# Patient Record
Sex: FEMALE | Race: WHITE | HISPANIC OR LATINO | Employment: UNEMPLOYED | URBAN - METROPOLITAN AREA
[De-identification: names, ages, dates, MRNs, and addresses within clinical notes are randomized per-mention and may not be internally consistent; named-entity substitution may affect disease eponyms.]

---

## 2017-01-11 ENCOUNTER — ALLSCRIPTS OFFICE VISIT (OUTPATIENT)
Dept: OTHER | Facility: OTHER | Age: 42
End: 2017-01-11

## 2017-01-12 ENCOUNTER — ALLSCRIPTS OFFICE VISIT (OUTPATIENT)
Dept: OTHER | Facility: OTHER | Age: 42
End: 2017-01-12

## 2017-01-25 ENCOUNTER — TRANSCRIBE ORDERS (OUTPATIENT)
Dept: ADMINISTRATIVE | Facility: HOSPITAL | Age: 42
End: 2017-01-25

## 2017-01-25 ENCOUNTER — ALLSCRIPTS OFFICE VISIT (OUTPATIENT)
Dept: OTHER | Facility: OTHER | Age: 42
End: 2017-01-25

## 2017-01-25 DIAGNOSIS — M54.5 LOW BACK PAIN, UNSPECIFIED BACK PAIN LATERALITY, UNSPECIFIED CHRONICITY, WITH SCIATICA PRESENCE UNSPECIFIED: Primary | ICD-10-CM

## 2017-01-25 DIAGNOSIS — M54.16 LUMBAR RADICULOPATHY: ICD-10-CM

## 2017-01-26 ENCOUNTER — GENERIC CONVERSION - ENCOUNTER (OUTPATIENT)
Dept: OTHER | Facility: OTHER | Age: 42
End: 2017-01-26

## 2017-01-31 ENCOUNTER — HOSPITAL ENCOUNTER (OUTPATIENT)
Dept: MRI IMAGING | Facility: HOSPITAL | Age: 42
Discharge: HOME | End: 2017-01-31
Attending: ORTHOPAEDIC SURGERY
Payer: COMMERCIAL

## 2017-01-31 DIAGNOSIS — M54.50 LOW BACK PAIN: ICD-10-CM

## 2017-01-31 DIAGNOSIS — M54.16 RADICULOPATHY OF LUMBAR REGION: ICD-10-CM

## 2017-01-31 PROCEDURE — 72148 MRI LUMBAR SPINE W/O DYE: CPT

## 2017-02-01 ENCOUNTER — ALLSCRIPTS OFFICE VISIT (OUTPATIENT)
Dept: OTHER | Facility: OTHER | Age: 42
End: 2017-02-01

## 2017-02-01 DIAGNOSIS — I83.819 VARICOSE VEINS OF LOWER EXTREMITY WITH PAIN: ICD-10-CM

## 2017-02-08 ENCOUNTER — ALLSCRIPTS OFFICE VISIT (OUTPATIENT)
Dept: OTHER | Facility: OTHER | Age: 42
End: 2017-02-08

## 2017-03-31 ENCOUNTER — GENERIC CONVERSION - ENCOUNTER (OUTPATIENT)
Dept: OTHER | Facility: OTHER | Age: 42
End: 2017-03-31

## 2017-05-31 ENCOUNTER — APPOINTMENT (OUTPATIENT)
Dept: LAB | Facility: HOSPITAL | Age: 42
End: 2017-05-31
Payer: COMMERCIAL

## 2017-05-31 DIAGNOSIS — I83.819 VARICOSE VEINS OF LOWER EXTREMITY WITH PAIN: ICD-10-CM

## 2017-05-31 DIAGNOSIS — R53.82 CHRONIC FATIGUE: ICD-10-CM

## 2017-05-31 DIAGNOSIS — E55.9 VITAMIN D DEFICIENCY: ICD-10-CM

## 2017-05-31 DIAGNOSIS — F32.9 MAJOR DEPRESSIVE DISORDER, SINGLE EPISODE: ICD-10-CM

## 2017-05-31 DIAGNOSIS — F41.9 ANXIETY DISORDER: ICD-10-CM

## 2017-05-31 DIAGNOSIS — E66.3 OVERWEIGHT: ICD-10-CM

## 2017-05-31 LAB
25(OH)D3 SERPL-MCNC: 14.8 NG/ML (ref 30–100)
ALBUMIN SERPL BCP-MCNC: 3.5 G/DL (ref 3.5–5)
ALP SERPL-CCNC: 97 U/L (ref 46–116)
ALT SERPL W P-5'-P-CCNC: 27 U/L (ref 12–78)
ANION GAP SERPL CALCULATED.3IONS-SCNC: 5 MMOL/L (ref 4–13)
AST SERPL W P-5'-P-CCNC: 15 U/L (ref 5–45)
BASOPHILS # BLD AUTO: 0.02 THOUSANDS/ΜL (ref 0–0.1)
BASOPHILS NFR BLD AUTO: 0 % (ref 0–1)
BILIRUB SERPL-MCNC: 0.24 MG/DL (ref 0.2–1)
BUN SERPL-MCNC: 18 MG/DL (ref 5–25)
CALCIUM SERPL-MCNC: 9.2 MG/DL (ref 8.3–10.1)
CHLORIDE SERPL-SCNC: 104 MMOL/L (ref 100–108)
CHOLEST SERPL-MCNC: 166 MG/DL (ref 50–200)
CO2 SERPL-SCNC: 32 MMOL/L (ref 21–32)
CREAT SERPL-MCNC: 0.87 MG/DL (ref 0.6–1.3)
EOSINOPHIL # BLD AUTO: 0.23 THOUSAND/ΜL (ref 0–0.61)
EOSINOPHIL NFR BLD AUTO: 3 % (ref 0–6)
ERYTHROCYTE [DISTWIDTH] IN BLOOD BY AUTOMATED COUNT: 12.9 % (ref 11.6–15.1)
GFR SERPL CREATININE-BSD FRML MDRD: >60 ML/MIN/1.73SQ M
GLUCOSE P FAST SERPL-MCNC: 101 MG/DL (ref 65–99)
HCT VFR BLD AUTO: 37.8 % (ref 34.8–46.1)
HDLC SERPL-MCNC: 51 MG/DL (ref 40–60)
HGB BLD-MCNC: 12.4 G/DL (ref 11.5–15.4)
LDLC SERPL CALC-MCNC: 104 MG/DL (ref 0–100)
LYMPHOCYTES # BLD AUTO: 2.77 THOUSANDS/ΜL (ref 0.6–4.47)
LYMPHOCYTES NFR BLD AUTO: 33 % (ref 14–44)
MCH RBC QN AUTO: 30.5 PG (ref 26.8–34.3)
MCHC RBC AUTO-ENTMCNC: 32.8 G/DL (ref 31.4–37.4)
MCV RBC AUTO: 93 FL (ref 82–98)
MONOCYTES # BLD AUTO: 0.36 THOUSAND/ΜL (ref 0.17–1.22)
MONOCYTES NFR BLD AUTO: 4 % (ref 4–12)
NEUTROPHILS # BLD AUTO: 4.94 THOUSANDS/ΜL (ref 1.85–7.62)
NEUTS SEG NFR BLD AUTO: 60 % (ref 43–75)
NRBC BLD AUTO-RTO: 0 /100 WBCS
PLATELET # BLD AUTO: 255 THOUSANDS/UL (ref 149–390)
PMV BLD AUTO: 10.3 FL (ref 8.9–12.7)
POTASSIUM SERPL-SCNC: 4.3 MMOL/L (ref 3.5–5.3)
PROT SERPL-MCNC: 7.6 G/DL (ref 6.4–8.2)
RBC # BLD AUTO: 4.06 MILLION/UL (ref 3.81–5.12)
SODIUM SERPL-SCNC: 141 MMOL/L (ref 136–145)
TRIGL SERPL-MCNC: 55 MG/DL
TSH SERPL DL<=0.05 MIU/L-ACNC: 0.28 UIU/ML (ref 0.36–3.74)
VIT B12 SERPL-MCNC: 389 PG/ML (ref 100–900)
WBC # BLD AUTO: 8.32 THOUSAND/UL (ref 4.31–10.16)

## 2017-05-31 PROCEDURE — 80053 COMPREHEN METABOLIC PANEL: CPT

## 2017-05-31 PROCEDURE — 36415 COLL VENOUS BLD VENIPUNCTURE: CPT

## 2017-05-31 PROCEDURE — 84443 ASSAY THYROID STIM HORMONE: CPT

## 2017-05-31 PROCEDURE — 85025 COMPLETE CBC W/AUTO DIFF WBC: CPT

## 2017-05-31 PROCEDURE — 82306 VITAMIN D 25 HYDROXY: CPT

## 2017-05-31 PROCEDURE — 82607 VITAMIN B-12: CPT

## 2017-05-31 PROCEDURE — 80061 LIPID PANEL: CPT

## 2017-06-01 ENCOUNTER — ANESTHESIA EVENT (OUTPATIENT)
Dept: PERIOP | Facility: HOSPITAL | Age: 42
End: 2017-06-01
Payer: COMMERCIAL

## 2017-06-01 RX ORDER — SODIUM CHLORIDE 9 MG/ML
125 INJECTION, SOLUTION INTRAVENOUS CONTINUOUS
Status: CANCELLED | OUTPATIENT
Start: 2017-06-01

## 2017-06-02 ENCOUNTER — APPOINTMENT (OUTPATIENT)
Dept: PREADMISSION TESTING | Facility: HOSPITAL | Age: 42
End: 2017-06-02
Payer: COMMERCIAL

## 2017-06-02 VITALS
HEART RATE: 67 BPM | BODY MASS INDEX: 35.49 KG/M2 | SYSTOLIC BLOOD PRESSURE: 126 MMHG | TEMPERATURE: 96.5 F | RESPIRATION RATE: 16 BRPM | HEIGHT: 65 IN | WEIGHT: 213 LBS | DIASTOLIC BLOOD PRESSURE: 68 MMHG

## 2017-06-02 DIAGNOSIS — G57.91 MONONEUROPATHY OF RIGHT LOWER EXTREMITY: ICD-10-CM

## 2017-06-02 DIAGNOSIS — R94.6 ABNORMAL RESULTS OF THYROID FUNCTION STUDIES: ICD-10-CM

## 2017-06-02 DIAGNOSIS — R60.9 EDEMA: ICD-10-CM

## 2017-06-02 RX ORDER — GABAPENTIN 600 MG/1
600 TABLET ORAL 3 TIMES DAILY
COMMUNITY
End: 2018-09-02

## 2017-06-05 ENCOUNTER — ANESTHESIA (OUTPATIENT)
Dept: PERIOP | Facility: HOSPITAL | Age: 42
End: 2017-06-05
Payer: COMMERCIAL

## 2017-06-05 ENCOUNTER — HOSPITAL ENCOUNTER (OUTPATIENT)
Facility: HOSPITAL | Age: 42
Setting detail: OUTPATIENT SURGERY
Discharge: HOME | End: 2017-06-05
Attending: SURGERY | Admitting: SURGERY
Payer: COMMERCIAL

## 2017-06-05 VITALS
BODY MASS INDEX: 35.49 KG/M2 | HEART RATE: 76 BPM | DIASTOLIC BLOOD PRESSURE: 70 MMHG | RESPIRATION RATE: 16 BRPM | OXYGEN SATURATION: 98 % | HEIGHT: 65 IN | TEMPERATURE: 97.6 F | SYSTOLIC BLOOD PRESSURE: 129 MMHG | WEIGHT: 213 LBS

## 2017-06-05 PROBLEM — I83.811 VARICOSE VEINS OF RIGHT LOWER EXTREMITIES WITH PAIN: Status: ACTIVE | Noted: 2017-06-05

## 2017-06-05 LAB — EXT PREGNANCY TEST URINE: NEGATIVE

## 2017-06-05 PROCEDURE — 81025 URINE PREGNANCY TEST: CPT | Performed by: ANESTHESIOLOGY

## 2017-06-05 RX ORDER — PROPOFOL 10 MG/ML
INJECTION, EMULSION INTRAVENOUS AS NEEDED
Status: DISCONTINUED | OUTPATIENT
Start: 2017-06-05 | End: 2017-06-05 | Stop reason: SURG

## 2017-06-05 RX ORDER — OXYCODONE HYDROCHLORIDE AND ACETAMINOPHEN 5; 325 MG/1; MG/1
2 TABLET ORAL ONCE
Status: COMPLETED | OUTPATIENT
Start: 2017-06-05 | End: 2017-06-05

## 2017-06-05 RX ORDER — OXYCODONE HYDROCHLORIDE AND ACETAMINOPHEN 5; 325 MG/1; MG/1
1 TABLET ORAL EVERY 4 HOURS PRN
Qty: 30 TABLET | Refills: 0 | Status: SHIPPED | OUTPATIENT
Start: 2017-06-05 | End: 2017-06-15

## 2017-06-05 RX ORDER — GLYCOPYRROLATE 0.2 MG/ML
INJECTION INTRAMUSCULAR; INTRAVENOUS AS NEEDED
Status: DISCONTINUED | OUTPATIENT
Start: 2017-06-05 | End: 2017-06-05 | Stop reason: SURG

## 2017-06-05 RX ORDER — SODIUM CHLORIDE 9 MG/ML
125 INJECTION, SOLUTION INTRAVENOUS CONTINUOUS
Status: DISCONTINUED | OUTPATIENT
Start: 2017-06-05 | End: 2017-06-05 | Stop reason: HOSPADM

## 2017-06-05 RX ORDER — MIDAZOLAM HYDROCHLORIDE 1 MG/ML
INJECTION INTRAMUSCULAR; INTRAVENOUS AS NEEDED
Status: DISCONTINUED | OUTPATIENT
Start: 2017-06-05 | End: 2017-06-05 | Stop reason: SURG

## 2017-06-05 RX ORDER — FENTANYL CITRATE 50 UG/ML
INJECTION, SOLUTION INTRAMUSCULAR; INTRAVENOUS AS NEEDED
Status: DISCONTINUED | OUTPATIENT
Start: 2017-06-05 | End: 2017-06-05 | Stop reason: SURG

## 2017-06-05 RX ORDER — ROCURONIUM BROMIDE 10 MG/ML
INJECTION, SOLUTION INTRAVENOUS AS NEEDED
Status: DISCONTINUED | OUTPATIENT
Start: 2017-06-05 | End: 2017-06-05 | Stop reason: SURG

## 2017-06-05 RX ORDER — EPHEDRINE SULFATE 50 MG/ML
INJECTION, SOLUTION INTRAVENOUS AS NEEDED
Status: DISCONTINUED | OUTPATIENT
Start: 2017-06-05 | End: 2017-06-05 | Stop reason: SURG

## 2017-06-05 RX ORDER — LIDOCAINE HYDROCHLORIDE 10 MG/ML
INJECTION, SOLUTION INFILTRATION; PERINEURAL AS NEEDED
Status: DISCONTINUED | OUTPATIENT
Start: 2017-06-05 | End: 2017-06-05 | Stop reason: SURG

## 2017-06-05 RX ORDER — FENTANYL CITRATE 50 UG/ML
50 INJECTION, SOLUTION INTRAMUSCULAR; INTRAVENOUS
Status: DISCONTINUED | OUTPATIENT
Start: 2017-06-05 | End: 2017-06-05 | Stop reason: HOSPADM

## 2017-06-05 RX ORDER — ONDANSETRON 2 MG/ML
4 INJECTION INTRAMUSCULAR; INTRAVENOUS EVERY 6 HOURS PRN
Status: DISCONTINUED | OUTPATIENT
Start: 2017-06-05 | End: 2017-06-05 | Stop reason: HOSPADM

## 2017-06-05 RX ORDER — ONDANSETRON 2 MG/ML
INJECTION INTRAMUSCULAR; INTRAVENOUS AS NEEDED
Status: DISCONTINUED | OUTPATIENT
Start: 2017-06-05 | End: 2017-06-05 | Stop reason: SURG

## 2017-06-05 RX ADMIN — SODIUM CHLORIDE 125 ML/HR: 0.9 INJECTION, SOLUTION INTRAVENOUS at 12:27

## 2017-06-05 RX ADMIN — FENTANYL CITRATE 50 MCG: 50 INJECTION, SOLUTION INTRAMUSCULAR; INTRAVENOUS at 13:29

## 2017-06-05 RX ADMIN — EPHEDRINE SULFATE 2.5 MG: 50 INJECTION, SOLUTION INTRAMUSCULAR; INTRAVENOUS; SUBCUTANEOUS at 13:42

## 2017-06-05 RX ADMIN — FENTANYL CITRATE 100 MCG: 50 INJECTION, SOLUTION INTRAMUSCULAR; INTRAVENOUS at 13:08

## 2017-06-05 RX ADMIN — FENTANYL CITRATE 50 MCG: 50 INJECTION, SOLUTION INTRAMUSCULAR; INTRAVENOUS at 14:15

## 2017-06-05 RX ADMIN — LIDOCAINE HYDROCHLORIDE 80 MG: 10 INJECTION, SOLUTION INFILTRATION; PERINEURAL at 13:08

## 2017-06-05 RX ADMIN — SODIUM CHLORIDE: 0.9 INJECTION, SOLUTION INTRAVENOUS at 15:25

## 2017-06-05 RX ADMIN — ROCURONIUM BROMIDE 50 MG: 10 INJECTION, SOLUTION INTRAVENOUS at 13:08

## 2017-06-05 RX ADMIN — FENTANYL CITRATE 25 MCG: 50 INJECTION, SOLUTION INTRAMUSCULAR; INTRAVENOUS at 15:22

## 2017-06-05 RX ADMIN — ROCURONIUM BROMIDE 10 MG: 10 INJECTION, SOLUTION INTRAVENOUS at 13:59

## 2017-06-05 RX ADMIN — CEFAZOLIN SODIUM 2000 MG: 2 SOLUTION INTRAVENOUS at 13:15

## 2017-06-05 RX ADMIN — MIDAZOLAM HYDROCHLORIDE 2 MG: 1 INJECTION, SOLUTION INTRAMUSCULAR; INTRAVENOUS at 13:02

## 2017-06-05 RX ADMIN — DEXAMETHASONE SODIUM PHOSPHATE 8 MG: 10 INJECTION INTRAMUSCULAR; INTRAVENOUS at 13:26

## 2017-06-05 RX ADMIN — OXYCODONE HYDROCHLORIDE AND ACETAMINOPHEN 2 TABLET: 5; 325 TABLET ORAL at 17:11

## 2017-06-05 RX ADMIN — NEOSTIGMINE METHYLSULFATE 4 MG: 1 INJECTION, SOLUTION INTRAMUSCULAR; INTRAVENOUS; SUBCUTANEOUS at 15:18

## 2017-06-05 RX ADMIN — SODIUM CHLORIDE: 0.9 INJECTION, SOLUTION INTRAVENOUS at 13:30

## 2017-06-05 RX ADMIN — ROCURONIUM BROMIDE 10 MG: 10 INJECTION, SOLUTION INTRAVENOUS at 14:32

## 2017-06-05 RX ADMIN — GLYCOPYRROLATE 0.8 MG: 0.2 INJECTION, SOLUTION INTRAMUSCULAR; INTRAVENOUS at 15:18

## 2017-06-05 RX ADMIN — PROPOFOL 200 MG: 10 INJECTION, EMULSION INTRAVENOUS at 13:08

## 2017-06-05 RX ADMIN — ONDANSETRON HYDROCHLORIDE 4 MG: 2 INJECTION, SOLUTION INTRAVENOUS at 13:26

## 2017-06-05 RX ADMIN — FENTANYL CITRATE 25 MCG: 50 INJECTION, SOLUTION INTRAMUSCULAR; INTRAVENOUS at 15:05

## 2017-06-05 RX ADMIN — FENTANYL CITRATE 50 MCG: 50 INJECTION, SOLUTION INTRAMUSCULAR; INTRAVENOUS at 13:59

## 2017-06-05 RX ADMIN — FENTANYL CITRATE 50 MCG: 50 INJECTION, SOLUTION INTRAMUSCULAR; INTRAVENOUS at 15:50

## 2017-06-06 ENCOUNTER — HOSPITAL ENCOUNTER (EMERGENCY)
Facility: HOSPITAL | Age: 42
Discharge: HOME | End: 2017-06-06
Attending: EMERGENCY MEDICINE | Admitting: EMERGENCY MEDICINE
Payer: COMMERCIAL

## 2017-06-06 VITALS
WEIGHT: 213 LBS | BODY MASS INDEX: 35.45 KG/M2 | TEMPERATURE: 98.2 F | SYSTOLIC BLOOD PRESSURE: 160 MMHG | HEART RATE: 67 BPM | OXYGEN SATURATION: 100 % | DIASTOLIC BLOOD PRESSURE: 76 MMHG | RESPIRATION RATE: 16 BRPM

## 2017-06-06 DIAGNOSIS — G89.18 ACUTE POST-OPERATIVE PAIN: Primary | ICD-10-CM

## 2017-06-06 PROCEDURE — 99282 EMERGENCY DEPT VISIT SF MDM: CPT

## 2017-06-06 PROCEDURE — 96372 THER/PROPH/DIAG INJ SC/IM: CPT

## 2017-06-06 RX ORDER — HYDROMORPHONE HCL 110MG/55ML
2 PATIENT CONTROLLED ANALGESIA SYRINGE INTRAVENOUS ONCE
Status: COMPLETED | OUTPATIENT
Start: 2017-06-06 | End: 2017-06-06

## 2017-06-06 RX ADMIN — HYDROMORPHONE HYDROCHLORIDE 2 MG: 2 INJECTION, SOLUTION INTRAMUSCULAR; INTRAVENOUS; SUBCUTANEOUS at 17:43

## 2017-06-07 ENCOUNTER — ALLSCRIPTS OFFICE VISIT (OUTPATIENT)
Dept: OTHER | Facility: OTHER | Age: 42
End: 2017-06-07

## 2017-06-08 ENCOUNTER — GENERIC CONVERSION - ENCOUNTER (OUTPATIENT)
Dept: OTHER | Facility: OTHER | Age: 42
End: 2017-06-08

## 2017-06-21 ENCOUNTER — ALLSCRIPTS OFFICE VISIT (OUTPATIENT)
Dept: OTHER | Facility: OTHER | Age: 42
End: 2017-06-21

## 2017-06-22 ENCOUNTER — HOSPITAL ENCOUNTER (OUTPATIENT)
Dept: NON INVASIVE DIAGNOSTICS | Facility: CLINIC | Age: 42
Discharge: HOME | End: 2017-06-22
Payer: COMMERCIAL

## 2017-06-22 DIAGNOSIS — R60.9 EDEMA: ICD-10-CM

## 2017-06-22 DIAGNOSIS — G57.91 MONONEUROPATHY OF RIGHT LOWER EXTREMITY: ICD-10-CM

## 2017-06-22 PROCEDURE — 93971 EXTREMITY STUDY: CPT

## 2017-06-24 ENCOUNTER — HOSPITAL ENCOUNTER (EMERGENCY)
Facility: HOSPITAL | Age: 42
Discharge: HOME | End: 2017-06-24
Admitting: EMERGENCY MEDICINE
Payer: COMMERCIAL

## 2017-06-24 VITALS
BODY MASS INDEX: 37.11 KG/M2 | SYSTOLIC BLOOD PRESSURE: 142 MMHG | WEIGHT: 223 LBS | RESPIRATION RATE: 18 BRPM | HEART RATE: 76 BPM | DIASTOLIC BLOOD PRESSURE: 76 MMHG | TEMPERATURE: 98 F | OXYGEN SATURATION: 100 %

## 2017-06-24 DIAGNOSIS — T81.31XA WOUND DEHISCENCE, SURGICAL, INITIAL ENCOUNTER: Primary | ICD-10-CM

## 2017-06-24 PROCEDURE — 96372 THER/PROPH/DIAG INJ SC/IM: CPT

## 2017-06-24 PROCEDURE — 87186 SC STD MICRODIL/AGAR DIL: CPT | Performed by: NURSE PRACTITIONER

## 2017-06-24 PROCEDURE — 87205 SMEAR GRAM STAIN: CPT | Performed by: NURSE PRACTITIONER

## 2017-06-24 PROCEDURE — 87147 CULTURE TYPE IMMUNOLOGIC: CPT | Performed by: NURSE PRACTITIONER

## 2017-06-24 PROCEDURE — 99283 EMERGENCY DEPT VISIT LOW MDM: CPT

## 2017-06-24 PROCEDURE — 87070 CULTURE OTHR SPECIMN AEROBIC: CPT | Performed by: NURSE PRACTITIONER

## 2017-06-24 RX ORDER — OXYCODONE HYDROCHLORIDE AND ACETAMINOPHEN 5; 325 MG/1; MG/1
1 TABLET ORAL EVERY 4 HOURS PRN
Qty: 10 TABLET | Refills: 0 | Status: SHIPPED | OUTPATIENT
Start: 2017-06-24 | End: 2017-06-27

## 2017-06-24 RX ORDER — ONDANSETRON 2 MG/ML
4 INJECTION INTRAMUSCULAR; INTRAVENOUS ONCE
Status: COMPLETED | OUTPATIENT
Start: 2017-06-24 | End: 2017-06-24

## 2017-06-24 RX ADMIN — HYDROMORPHONE HYDROCHLORIDE 1 MG: 1 INJECTION, SOLUTION INTRAMUSCULAR; INTRAVENOUS; SUBCUTANEOUS at 21:35

## 2017-06-24 RX ADMIN — ONDANSETRON 4 MG: 2 INJECTION INTRAMUSCULAR; INTRAVENOUS at 21:29

## 2017-06-27 LAB
BACTERIA WND AEROBE CULT: NORMAL
GRAM STN SPEC: NORMAL
GRAM STN SPEC: NORMAL

## 2017-06-28 ENCOUNTER — ALLSCRIPTS OFFICE VISIT (OUTPATIENT)
Dept: OTHER | Facility: OTHER | Age: 42
End: 2017-06-28

## 2017-07-12 ENCOUNTER — ALLSCRIPTS OFFICE VISIT (OUTPATIENT)
Dept: OTHER | Facility: OTHER | Age: 42
End: 2017-07-12

## 2017-09-13 ENCOUNTER — GENERIC CONVERSION - ENCOUNTER (OUTPATIENT)
Dept: OTHER | Facility: OTHER | Age: 42
End: 2017-09-13

## 2017-10-11 ENCOUNTER — GENERIC CONVERSION - ENCOUNTER (OUTPATIENT)
Dept: OTHER | Facility: OTHER | Age: 42
End: 2017-10-11

## 2017-11-08 ENCOUNTER — HOSPITAL ENCOUNTER (EMERGENCY)
Facility: HOSPITAL | Age: 42
Discharge: HOME | End: 2017-11-08
Admitting: EMERGENCY MEDICINE
Payer: COMMERCIAL

## 2017-11-08 VITALS
DIASTOLIC BLOOD PRESSURE: 70 MMHG | SYSTOLIC BLOOD PRESSURE: 161 MMHG | HEART RATE: 64 BPM | OXYGEN SATURATION: 99 % | TEMPERATURE: 98.2 F | RESPIRATION RATE: 16 BRPM

## 2017-11-08 DIAGNOSIS — Z76.0 MEDICATION REFILL: Primary | ICD-10-CM

## 2017-11-08 DIAGNOSIS — F32.A DEPRESSION: ICD-10-CM

## 2017-11-08 PROCEDURE — 99281 EMR DPT VST MAYX REQ PHY/QHP: CPT

## 2017-11-08 RX ORDER — FLUOXETINE HYDROCHLORIDE 20 MG/1
20 CAPSULE ORAL EVERY MORNING
Qty: 2 EACH | Refills: 0 | Status: SHIPPED | OUTPATIENT
Start: 2017-11-08 | End: 2018-09-02

## 2017-11-08 RX ORDER — CLONAZEPAM 1 MG/1
1 TABLET ORAL 2 TIMES DAILY PRN
Qty: 4 TABLET | Refills: 0 | Status: SHIPPED | OUTPATIENT
Start: 2017-11-08 | End: 2018-09-02

## 2017-11-08 RX ORDER — BUPROPION HYDROCHLORIDE 150 MG/1
150 TABLET, EXTENDED RELEASE ORAL 2 TIMES DAILY
Qty: 4 TABLET | Refills: 0 | Status: SHIPPED | OUTPATIENT
Start: 2017-11-08 | End: 2018-09-02

## 2017-11-08 NOTE — ED PROVIDER NOTES
History  Chief Complaint   Patient presents with    Medication Refill     Pt reports out of psychiatric medications x 2 days, reports here for refill  Pt reports has appointment with doctor today     This is a 77-year-old female patient who presents because she states she ran out of her Klonopin and Wellbutrin Clorox 18  She states she had a psychiatrist that closed she then was getting and her family doctor and the family doctor is now not refilling prescriptions so now she is here  Her last dose was 2 days ago  She is not homicidal or suicidal she states she has an appointment with a psychiatrist office today I a m  willing to give her prescription for 2 days maximum of her medications  I explained to her that it is not the position of the emergency department to refill prescriptions and will not do this again  She is not homicidal or suicidal she has no other complaints besides here for a med refill            Prior to Admission Medications   Prescriptions Last Dose Informant Patient Reported? Taking?    Fluoxetine HCl, PMDD, 20 MG CAPS  Self Yes Yes   Sig: Take 20 mg by mouth every morning     MELOXICAM PO  Self Yes Yes   Sig: Take 7 5 mg by mouth 2 (two) times a day     Riboflavin 400 MG CAPS  Self No Yes   Sig: Take 400 mg by mouth every morning for 30 days   albuterol (VENTOLIN HFA) 90 mcg/act inhaler  Self Yes Yes   Sig: Inhale 2 puffs as needed     buPROPion (WELLBUTRIN SR) 150 mg 12 hr tablet  Self Yes Yes   Sig: Take 150 mg by mouth 2 (two) times a day     cetirizine (ZyrTEC) 10 mg tablet  Self Yes Yes   Sig: Take 10 mg by mouth daily     clonazePAM (KlonoPIN) 1 mg tablet  Self Yes Yes   Sig: Take 1 mg by mouth 2 (two) times a day as needed     gabapentin (NEURONTIN) 600 MG tablet  Self Yes Yes   Sig: Take 600 mg by mouth 3 (three) times a day   hydrOXYzine HCL (ATARAX) 25 mg tablet  Self Yes Yes   Sig: Take 25 mg by mouth every 6 (six) hours as needed     omeprazole (PriLOSEC) 40 MG capsule  Self Yes Yes   Sig: Take 40 mg by mouth every morning     oxyCODONE-acetaminophen (PERCOCET) 5-325 mg per tablet  Self Yes Yes   Sig: Take 2 tablets by mouth as needed     zolpidem (AMBIEN) 10 mg tablet  Self Yes Yes   Sig: Take 10 mg by mouth daily at bedtime        Facility-Administered Medications: None       Past Medical History:   Diagnosis Date    Anemia     Anxiety     Arthritis     hands, knee    Asthma     uses inhalers for the same  No recnt flairups    Back pain     Carpal tunnel syndrome     Bilateral    Constipation     Depression     Environmental allergies     GERD (gastroesophageal reflux disease)     H/O cardiac murmur     Insomnia     Kidney stone     Migraine     Psychiatric disorder     depression    Psychiatric disorder     anxiety    Varicose veins of both lower extremities with pain     Wears glasses        Past Surgical History:   Procedure Laterality Date    BACK SURGERY      States she isn't sure what she had done-possibly something to do with a lump or muscle    CHOLECYSTECTOMY      [facility] LIGATN LONG SAPHENOUS VEIN AT [facility] Left 12/22/2016    Procedure: LIGATION/STRIPPING VEIN, LIGATION OF ANTERIOR TRIBUTARY GREATER SAPHENOUS VEIN BRANCH;  Surgeon: Kimber Hamilton DO;  [facility];  Service: Vascular    [facility] PHLEB VEINS - [facility] - TO  Left 12/22/2016    Procedure: MULTIPLE STAB PHLEBECTOMIES;  Surgeon: Kimber Hamilton DO;  [facility];  Service: Vascular    TUBAL LIGATION      VARICOSE VEIN SURGERY Left     Left leg    VEIN LIGATION Right 6/5/2017    Procedure: LIGATION GREATER SAPHENOUS VEIN TRIBUTARY WITH STAB PHLEBECTOMIES ;  Surgeon: Kimber Hamilton DO;  Location[facility];  Service:        History reviewed  No pertinent family history  I have reviewed and agree with the history as documented      Social History   Substance Use Topics    Smoking status: Current Every Day Smoker     Packs/day: 0 50     Types: Cigarettes    Smokeless tobacco: Never Used    Alcohol use No        Review of Systems    Physical Exam  ED Triage Vitals [11/08/17 1100]   Temperature Pulse Respirations Blood Pressure SpO2   98 2 °F (36 8 °C) 64 16 161/70 99 %      Temp Source Heart Rate Source Patient Position - Orthostatic VS BP Location FiO2 (%)   Oral Monitor Sitting Right arm --      Pain Score       No Pain           Orthostatic Vital Signs  Vitals:    11/08/17 1100   BP: 161/70   Pulse: 64   Patient Position - Orthostatic VS: Sitting       Physical Exam   Constitutional: She appears well-developed and well-nourished  HENT:   Head: Normocephalic and atraumatic  Right Ear: External ear normal    Left Ear: External ear normal    Nose: Nose normal    Mouth/Throat: Oropharynx is clear and moist    Eyes: Conjunctivae are normal  Pupils are equal, round, and reactive to light  Neck: Normal range of motion  Neck supple  Cardiovascular: Normal rate and regular rhythm  Pulmonary/Chest: Effort normal and breath sounds normal    Abdominal: Soft  Bowel sounds are normal  There is no tenderness  Neurological: She is alert  Skin: Skin is warm  Psychiatric: She has a normal mood and affect  Her behavior is normal    Nursing note and vitals reviewed        ED Medications  Medications - No data to display    Diagnostic Studies  Results Reviewed     None                 No orders to display              Procedures  Procedures       Phone Contacts  ED Phone Contact    ED Course  ED Course                                MDM  CritCare Time    Disposition  Final diagnoses:   Medication refill   Depression     Time reflects when diagnosis was documented in both MDM as applicable and the Disposition within this note     Time User Action Codes Description Comment    11/8/2017 11:35 AM Rocky Duran Add [Z76 0] Medication refill     11/8/2017 11:35 AM Rocky Duran [F32 9] Depression       ED Disposition     ED Disposition Condition Comment Discharge  Thomas 8977 discharge to home/self care  Condition at discharge: Good        Follow-up Information     Follow up With Specialties Details Why [address], [place] [facility]  Also follow up with your psychiatrist today as planned for all further refills  [facility]  [address]  [phone]          Patient's Medications   Discharge Prescriptions    No medications on file     No discharge procedures on file      ED Provider  Electronically Signed by           Brit Cui PA-C  11/08/17 4146

## 2017-11-08 NOTE — DISCHARGE INSTRUCTIONS
Medicine Refill   WHAT YOU NEED TO KNOW:   You may have been given a prescription in the emergency department for a few days of your medicine  It is important to refill your medicine before you completely run out  You need to follow up with your healthcare provider for a full prescription within the next few days  You will not be given additional refills in the emergency department  DISCHARGE INSTRUCTIONS:   Follow up with your healthcare provider:  Contact your healthcare provider before  you are completely out of medicine  Write down your questions so you remember to ask them during your visits  Refill tips:  Your medicine will treat your condition if you take the medicine regularly  Prevent missed doses by doing the following:  · Keep a chart of your medicine  Include all of your current medicines  Write down the name and strength of each medicine, the prescription number, and the number of refills  Also write down the dates of your refills  Ask your pharmacy or insurance provider for other ways to help you keep track of your medicines  · Refill medicines a few days before you run out  This will decrease any problems that will prevent you from getting your medicines on time  Problems include a closed pharmacy, or the pharmacy may have to contact your healthcare provider  · If you know you are going to be traveling, refill your medicines before you leave  You may not be able to get refills if you do not use your local pharmacy  You may need to call your insurance provider to make them aware of your travels  © 2017 [employer] Information is for End User's use only and may not be sold, redistributed or otherwise used for commercial purposes  All illustrations and images included in CareNotes® are the copyrighted property of [employer]  or [employer]  The above information is an  only   It is not intended as medical advice for individual conditions or treatments  Talk to your doctor, nurse or pharmacist before following any medical regimen to see if it is safe and effective for you

## 2017-11-08 NOTE — ED NOTES
Reports that she needs refills of wellbutrin, klonopin, ambein, fluoxetine  Reports to not have taken medications for two days        Jyotsna Barton RN  11/08/17 2069

## 2017-11-28 ENCOUNTER — GENERIC CONVERSION - ENCOUNTER (OUTPATIENT)
Dept: OTHER | Facility: OTHER | Age: 42
End: 2017-11-28

## 2017-11-28 DIAGNOSIS — K59.00 CONSTIPATION: ICD-10-CM

## 2017-11-28 DIAGNOSIS — E55.9 VITAMIN D DEFICIENCY: ICD-10-CM

## 2017-11-28 DIAGNOSIS — R60.9 EDEMA: ICD-10-CM

## 2017-11-28 DIAGNOSIS — R94.6 ABNORMAL RESULTS OF THYROID FUNCTION STUDIES: ICD-10-CM

## 2017-11-28 DIAGNOSIS — E66.3 OVERWEIGHT: ICD-10-CM

## 2017-12-01 ENCOUNTER — APPOINTMENT (OUTPATIENT)
Dept: LAB | Facility: HOSPITAL | Age: 42
End: 2017-12-01
Payer: COMMERCIAL

## 2017-12-01 DIAGNOSIS — R60.9 EDEMA: ICD-10-CM

## 2017-12-01 DIAGNOSIS — R94.6 ABNORMAL RESULTS OF THYROID FUNCTION STUDIES: ICD-10-CM

## 2017-12-01 DIAGNOSIS — K59.00 CONSTIPATION: ICD-10-CM

## 2017-12-01 DIAGNOSIS — E55.9 VITAMIN D DEFICIENCY: ICD-10-CM

## 2017-12-01 LAB
25(OH)D3 SERPL-MCNC: 11 NG/ML (ref 30–100)
ALBUMIN SERPL BCP-MCNC: 3.6 G/DL (ref 3.5–5)
ALP SERPL-CCNC: 112 U/L (ref 46–116)
ALT SERPL W P-5'-P-CCNC: 31 U/L (ref 12–78)
ANION GAP SERPL CALCULATED.3IONS-SCNC: 8 MMOL/L (ref 4–13)
AST SERPL W P-5'-P-CCNC: 20 U/L (ref 5–45)
BASOPHILS # BLD AUTO: 0.02 THOUSANDS/ΜL (ref 0–0.1)
BASOPHILS NFR BLD AUTO: 0 % (ref 0–1)
BILIRUB SERPL-MCNC: 0.49 MG/DL (ref 0.2–1)
BUN SERPL-MCNC: 16 MG/DL (ref 5–25)
CALCIUM SERPL-MCNC: 9.5 MG/DL (ref 8.3–10.1)
CHLORIDE SERPL-SCNC: 101 MMOL/L (ref 100–108)
CO2 SERPL-SCNC: 31 MMOL/L (ref 21–32)
CREAT SERPL-MCNC: 0.9 MG/DL (ref 0.6–1.3)
EOSINOPHIL # BLD AUTO: 0.27 THOUSAND/ΜL (ref 0–0.61)
EOSINOPHIL NFR BLD AUTO: 3 % (ref 0–6)
ERYTHROCYTE [DISTWIDTH] IN BLOOD BY AUTOMATED COUNT: 13.1 % (ref 11.6–15.1)
GFR SERPL CREATININE-BSD FRML MDRD: 78 ML/MIN/1.73SQ M
GLUCOSE P FAST SERPL-MCNC: 110 MG/DL (ref 65–99)
HCT VFR BLD AUTO: 41.5 % (ref 34.8–46.1)
HGB BLD-MCNC: 13.9 G/DL (ref 11.5–15.4)
LYMPHOCYTES # BLD AUTO: 2.6 THOUSANDS/ΜL (ref 0.6–4.47)
LYMPHOCYTES NFR BLD AUTO: 24 % (ref 14–44)
MCH RBC QN AUTO: 30.3 PG (ref 26.8–34.3)
MCHC RBC AUTO-ENTMCNC: 33.5 G/DL (ref 31.4–37.4)
MCV RBC AUTO: 91 FL (ref 82–98)
MONOCYTES # BLD AUTO: 0.68 THOUSAND/ΜL (ref 0.17–1.22)
MONOCYTES NFR BLD AUTO: 6 % (ref 4–12)
NEUTROPHILS # BLD AUTO: 7.39 THOUSANDS/ΜL (ref 1.85–7.62)
NEUTS SEG NFR BLD AUTO: 67 % (ref 43–75)
NRBC BLD AUTO-RTO: 0 /100 WBCS
PLATELET # BLD AUTO: 274 THOUSANDS/UL (ref 149–390)
PMV BLD AUTO: 10.8 FL (ref 8.9–12.7)
POTASSIUM SERPL-SCNC: 3.7 MMOL/L (ref 3.5–5.3)
PROT SERPL-MCNC: 8 G/DL (ref 6.4–8.2)
RBC # BLD AUTO: 4.58 MILLION/UL (ref 3.81–5.12)
SODIUM SERPL-SCNC: 140 MMOL/L (ref 136–145)
TSH SERPL DL<=0.05 MIU/L-ACNC: 1.38 UIU/ML (ref 0.36–3.74)
WBC # BLD AUTO: 10.96 THOUSAND/UL (ref 4.31–10.16)

## 2017-12-01 PROCEDURE — 80053 COMPREHEN METABOLIC PANEL: CPT

## 2017-12-01 PROCEDURE — 84443 ASSAY THYROID STIM HORMONE: CPT

## 2017-12-01 PROCEDURE — 85025 COMPLETE CBC W/AUTO DIFF WBC: CPT

## 2017-12-01 PROCEDURE — 36415 COLL VENOUS BLD VENIPUNCTURE: CPT

## 2017-12-01 PROCEDURE — 82306 VITAMIN D 25 HYDROXY: CPT

## 2018-01-10 NOTE — MISCELLANEOUS
Signatures   Electronically signed by : Mingo Springer DO; Mar 31 2017  1:42PM EST                       (Author)

## 2018-01-11 NOTE — MISCELLANEOUS
Saji Buchanan  Dear GABINO :    You have had _3_ No-Show appointments at our office  9/29/16,1/11/17,10/10/17  A No-Show is defined as any patient who fails to arrive for a scheduled appointment without canceling the appointment prior to the scheduled time  A patient who has more than  THREE No-Shows may be dismissed from an [address] [place] [address] practice  Please call in advance to cancel appointments  If you continue to No-Show for scheduled appointments, you may be dismissed from our practice  Thank You  Usted ha tenido _ 3_ No-Show citas en nuestra oficina 9/29/16,1/11/17,10/10/17  Un No-Show se define brittany cualquier paciente que no llega a rah daniel programada sin cancelar  la daniel antes de la hora programada  Un paciente que tiene más de LEONA No-Show puede ser despedido de un [facility] práctica  Por favor llame  con anticipación para cancelar citas  Si continúa la "No-Show" para las citas programadas, usted puede ser despedido de nuestra práctica  Aleks

## 2018-01-13 VITALS
WEIGHT: 220.38 LBS | SYSTOLIC BLOOD PRESSURE: 120 MMHG | HEIGHT: 62 IN | BODY MASS INDEX: 40.55 KG/M2 | HEART RATE: 88 BPM | RESPIRATION RATE: 18 BRPM | DIASTOLIC BLOOD PRESSURE: 80 MMHG

## 2018-01-13 VITALS
HEART RATE: 98 BPM | BODY MASS INDEX: 37.42 KG/M2 | SYSTOLIC BLOOD PRESSURE: 113 MMHG | WEIGHT: 218 LBS | DIASTOLIC BLOOD PRESSURE: 76 MMHG

## 2018-01-13 VITALS
HEIGHT: 64 IN | BODY MASS INDEX: 37.93 KG/M2 | RESPIRATION RATE: 20 BRPM | OXYGEN SATURATION: 97 % | HEART RATE: 82 BPM | SYSTOLIC BLOOD PRESSURE: 130 MMHG | TEMPERATURE: 97.3 F | WEIGHT: 222.19 LBS | DIASTOLIC BLOOD PRESSURE: 80 MMHG

## 2018-01-13 VITALS
HEART RATE: 92 BPM | SYSTOLIC BLOOD PRESSURE: 113 MMHG | DIASTOLIC BLOOD PRESSURE: 77 MMHG | WEIGHT: 221 LBS | BODY MASS INDEX: 37.93 KG/M2

## 2018-01-13 NOTE — MISCELLANEOUS
Provider Comments  Provider Comments:   Pt no showed for her 240 pm appt today      Signatures   Electronically signed by : RILEY Falcon; Jan 11 2017  4:17PM EST                       (Author)

## 2018-01-14 VITALS — DIASTOLIC BLOOD PRESSURE: 80 MMHG | RESPIRATION RATE: 18 BRPM | SYSTOLIC BLOOD PRESSURE: 126 MMHG | HEART RATE: 92 BPM

## 2018-01-14 VITALS
WEIGHT: 218 LBS | DIASTOLIC BLOOD PRESSURE: 80 MMHG | HEIGHT: 62 IN | RESPIRATION RATE: 14 BRPM | BODY MASS INDEX: 40.12 KG/M2 | HEART RATE: 78 BPM | SYSTOLIC BLOOD PRESSURE: 120 MMHG

## 2018-01-14 VITALS
BODY MASS INDEX: 37.22 KG/M2 | RESPIRATION RATE: 14 BRPM | HEIGHT: 64 IN | WEIGHT: 218 LBS | HEART RATE: 60 BPM | DIASTOLIC BLOOD PRESSURE: 62 MMHG | SYSTOLIC BLOOD PRESSURE: 100 MMHG

## 2018-01-14 NOTE — MISCELLANEOUS
Message  SW PT INFORMED HER THAT SHE NO SHOWED AND CANCELLED SEVERAL APPTS INCLUDING POST OP DOPPLER AND DUE TO THIS IF PT DID NOT SHOW FOR DOPPLER TOMORROW 1/27 OR APPT 2/1 PT WILL BE AT RISK FOR DISCHARGE FROM PRACTICE  PT IS AWARE AND UNDERSTANDS OF ALL THAT WAS COMMUNICATED TO HER IN [place]  Active Problems    1  Anxiety (300 00) (F41 9)   2  Asthma (493 90) (J45 909)   3  Breast cancer screening (V76 10) (Z12 39)   4  Carpal tunnel syndrome (354 0) (G56 00)   5  Cervical cancer screening (V76 2) (Z12 4)   6  Chronic fatigue (780 79) (R53 82)   7  Chronic low back pain (724 2,338 29) (M54 5,G89 29)   8  Constipation (564 00) (K59 00)   9  Depression (311) (F32 9)   10  Dysuria (788 1) (R30 0)   11  Easy bruisability (782 9) (R23 8)   12  Edema (782 3) (R60 9)   13  Encounter for screening mammogram for malignant neoplasm of breast (V76 12)    (Z12 31)   14  Esophageal reflux (530 81) (K21 9)   15  Hematuria (599 70) (R31 9)   16  Left leg pain (729 5) (M79 605)   17  Lumbar radicular pain (724 4) (M54 16)   18  Microscopic hematuria (599 72) (R31 29)   19  Nicotine use disorder (305 1) (F17 200)   20  Overweight (278 02) (E66 3)   21  Pap smear for cervical cancer screening (V76 2) (Z12 4)   22  Recurrent UTI (599 0) (N39 0)   23  Varicose veins with pain (454 8) (I83 819)   24  Vitamin D deficiency (268 9) (E55 9)    Current Meds   1  Acetaminophen-Codeine #4 300-60 MG Oral Tablet; TAKE 1 TABLET EVERY  8 HOURS   AS NEEDED FOR PAIN;   Therapy: 88IZU6147 to (Evaluate:29Jan2017); Last Rx:23Gyf4094 Ordered   2  BuPROPion HCl ER (SR) 150 MG Oral Tablet Extended Release 12 Hour; take 1 tablet   by mouth twice a day  Requested for: 11PMQ2717; Last Rx:12Jan2017 Ordered   3  Cetirizine HCl - 10 MG Oral Tablet; TAKE 1 TABLET DAILY AS DIRECTED  Requested   for: 33SVK9796; Last Rx:18Nov2016 Ordered   4  ClonazePAM 1 MG Oral Tablet; TAKE 1 pill twice a day; Last Rx:12Jan2017 Ordered   5   Fleet Enema 7-19 GM/118ML Rectal Enema; USE AS DIRECTED; Therapy: 24Sdw8404 to (Evaluate:20Jan2017)  Requested for: 35Vpc5144; Last   Rx:39Mqa8428 Ordered   6  Fluconazole 150 MG Oral Tablet Recorded   7  FLUoxetine HCl - 20 MG Oral Tablet; Take 2 pills in the morning  Requested for:   12Jan2017; Last Rx:12Jan2017 Ordered   8  Gabapentin 600 MG Oral Tablet; TAKE 1 TABLET 3 TIMES DAILY; Therapy: 38UUO5977 to (Evaluate:67Frs3832)  Requested for: 15PAM0885; Last   Rx:12Jan2017 Ordered   9  HydroCHLOROthiazide 12 5 MG Oral Tablet; TAKE 1 TABLET DAILY AS NEEDED; Therapy: 83HUD1082 to (Evaluate:13Mar2017)  Requested for: 78BOW1171; Last   Rx:12Jan2017 Ordered   10  HydrOXYzine HCl - 25 MG Oral Tablet; TAKE 2 TABLETS AT NIGHT; Therapy: 08LFA1217 to (Evaluate:26Jun2016)  Requested for: 28Mar2016; Last    Rx:28Mar2016 Ordered   11  Meloxicam 7 5 MG Oral Tablet; TAKE 1 TABLET TWICE DAILY WITH FOOD; Therapy: 49HHF3448 to (Evaluate:87Fwm4332)  Requested for: 30HAH3608; Last    Rx:25Jan2017 Ordered   12  Methocarbamol 750 MG Oral Tablet; Take 1 pill at night for muscle spasm  Requested    for: 12Evk3830; Last Rx:28Twk4070 Ordered   13  Omeprazole 40 MG Oral Capsule Delayed Release; take one capsule by mouth daily; Therapy: 05TQE8886 to (Evaluate:16Hyy5814)  Requested for: 29ALU1741; Last    Rx:18Nov2016 Ordered   14  Oxycodone-Acetaminophen 5-325 MG Oral Tablet (Percocet); TAKE 1 TABLET EVERY 4    TO 6 HOURS AS DIRECTED FOR SEVERE PAIN;    Therapy: 35ZTB2269 to (Evaluate:15Jan2017); Last Rx:12Jan2017 Ordered   15  Phentermine HCl - 37 5 MG Oral Tablet Recorded   16  Riboflavin 400 MG Oral Tablet; Take 1 pill a day; Therapy: 14ABV7845 to (Evaluate:41Uss4795)  Requested for: 43XKC9040; Last    Rx:18Nov2016 Ordered   17  Sprintec 28 TABS Recorded   18  Ventolin  (90 Base) MCG/ACT Inhalation Aerosol Solution; INHALE 2 PUFFS    EVERY 4-6 HOURS AS NEEDED  Requested for: 40PBQ1214; Last Rx:01Oct2015    Ordered   19   Vitamin D3 5000 UNIT Oral Capsule; Take 2 capsules daily; Therapy: 04Ylk6842 to (Evaluate:59Fva0936)  Requested for: 08SXC1913; Last    Rx:50Mzs9722 Ordered   20  Zolpidem Tartrate 10 MG Oral Tablet; TAKE 1 TABLET AT BEDTIME AS NEEDED; Last    Rx:12Jan2017 Ordered   21  ZyrTEC Allergy 10 MG Oral Capsule; Take 1 tablet at night; Therapy: 69TAY4072 to (Evaluate:25Jun2016)  Requested for: 09GMT1775; Last    Rx:67Hbt4746 Ordered    Allergies    1   No Known Drug Allergies    Signatures   Electronically signed by : Uday Clifton, ; Jan 26 2017  2:22PM EST                       (Author)

## 2018-01-15 NOTE — MISCELLANEOUS
Message  pt called Estelita Better interpreted, she does not speak [place])  incision inner thigh near groin is draining small amt bright red drainage  it started approx 0200  she did not apply bandage, has been wiping w/ tissue  s/w ARIEL Alvarado, she saw pt yesterday w/ Dr Lachelle Remy  pt had a lot of swelling and was told to elevate leg above heart level  she should apply 4x4 and cont to watch  if bleeding increases she should go to er to be eval if severe, if mild call us  she should also call us if fever or chills develop  Omer Broussard informed pt of same  Active Problems    1  Abnormal thyroid blood test (794 5) (R94 6)   2  Anxiety (300 00) (F41 9)   3  Asthma (493 90) (J45 909)   4  Breast cancer screening (V76 10) (Z12 39)   5  Carpal tunnel syndrome (354 0) (G56 00)   6  Cervical cancer screening (V76 2) (Z12 4)   7  Chronic fatigue (780 79) (R53 82)   8  Chronic low back pain (724 2,338 29) (M54 5,G89 29)   9  Constipation (564 00) (K59 00)   10  Depression (311) (F32 9)   11  Dysuria (788 1) (R30 0)   12  Easy bruisability (782 9) (R23 8)   13  Edema (782 3) (R60 9)   14  Encounter for screening mammogram for malignant neoplasm of breast (V76 12)    (Z12 31)   15  Esophageal reflux (530 81) (K21 9)   16  Hematuria (599 70) (R31 9)   17  Left leg pain (729 5) (M79 605)   18  Lumbar radicular pain (724 4) (M54 16)   19  Microscopic hematuria (599 72) (R31 29)   20  Nicotine use disorder (305 1) (F17 200)   21  Overweight (278 02) (E66 3)   22  Pap smear for cervical cancer screening (V76 2) (Z12 4)   23  Post-operative pain (338 18) (G89 18)   24  Post-operative state (V45 89) (Z98 890)   25  Recurrent UTI (599 0) (N39 0)   26  Varicose veins with pain (454 8) (I83 819)   27  Vitamin D deficiency (268 9) (E55 9)    Current Meds   1  BuPROPion HCl ER (SR) 150 MG Oral Tablet Extended Release 12 Hour; take 1 tablet   by mouth twice a day  Requested for: 28Mar2017; Last Rx:28Mar2017 Ordered   2   Cetirizine HCl - 10 MG Oral Tablet; TAKE 1 TABLET DAILY AS DIRECTED  Requested   for: 50QUF6413; Last Rx:18Nov2016 Ordered   3  ClonazePAM 1 MG Oral Tablet; TAKE 1 pill twice a day; Last Rx:26Nnd8597 Ordered   4  Endocet 7 5-325 MG Oral Tablet; TAKE 1 TABLET EVERY 4 TO 6 HOURS AS NEEDED   FOR PAIN;   Therapy: 49HOC6460 to (Evaluate:12Jun2017); Last Rx:07Jun2017 Ordered   5  Fleet Enema 7-19 GM/118ML Rectal Enema; USE AS DIRECTED; Therapy: 27Zfy7564 to (Evaluate:20Jan2017)  Requested for: 97Lac0309; Last   Rx:20Woh8177 Ordered   6  Fluconazole 150 MG Oral Tablet Recorded   7  FLUoxetine HCl - 20 MG Oral Tablet; Take 2 pills in the morning  Requested for:   25Apr2017; Last Rx:25Apr2017 Ordered   8  Gabapentin 600 MG Oral Tablet; TAKE 1 TABLET 3 TIMES DAILY; Therapy: 06DBD5340 to (Evaluate:72Blt4772)  Requested for: 58FDZ1727; Last   Rx:12Jan2017 Ordered   9  HydroCHLOROthiazide 12 5 MG Oral Tablet; TAKE 1 TABLET DAILY AS NEEDED; Therapy: 88WDL3054 to (Evaluate:29Ksv1768)  Requested for: 83RWD6670; Last   Rx:30May2017 Ordered   10  HydrOXYzine HCl - 25 MG Oral Tablet; TAKE 2 TABLETS AT NIGHT; Therapy: 73ZMT4538 to (Evaluate:26Jun2016)  Requested for: 28Mar2016; Last    Rx:28Mar2016 Ordered   11  Meloxicam 7 5 MG Oral Tablet; TAKE 1 TABLET TWICE DAILY WITH FOOD; Therapy: 55WLD9856 to (Evaluate:26Jun2017)  Requested for: 28Mar2017; Last    Rx:28Mar2017 Ordered   12  Methocarbamol 750 MG Oral Tablet; Take 1 pill at night for muscle spasm  Requested    for: 27Jnj8787; Last Rx:97Xxy5219 Ordered   13  Omeprazole 40 MG Oral Capsule Delayed Release; take one capsule by mouth daily; Therapy: 40ZWX6234 to (Evaluate:09Oct2017)  Requested for: 12Apr2017; Last    Rx:12Apr2017 Ordered   14  Oxycodone-Acetaminophen 5-325 MG Oral Tablet (Percocet); TAKE 1 TABLET EVERY 4    TO 6 HOURS AS DIRECTED FOR SEVERE PAIN;    Therapy: 69ORK1530 to (Evaluate:15Jan2017); Last Rx:12Jan2017 Ordered   15   Phentermine HCl - 37 5 MG Oral Tablet Recorded 16  Riboflavin 400 MG Oral Tablet; Take 1 pill a day; Therapy: 12JFF1978 to (Evaluate:34Pgp8376)  Requested for: 91WKY5238; Last    Rx:74Qxi4788 Ordered   17  Sprintec 28 TABS Recorded   18  TraMADol HCl - 50 MG Oral Tablet; TAKE 1 TABLET Every 6 hours PRN pain; Therapy: 20OXW1577 to (Evaluate:15Smz9021); Last Rx:89Wmq6973 Ordered   19  Ventolin  (90 Base) MCG/ACT Inhalation Aerosol Solution; INHALE 2 PUFFS    EVERY 4-6 HOURS AS NEEDED  Requested for: 98XFV4146; Last Rx:76Kwj4324    Ordered   20  Vitamin D (Ergocalciferol) 15085 UNIT Oral Capsule; TAKE ONE CAPSULE BY MOUTH    WEEKLY; Therapy: 45DPT7433 to (Evaluate:41Qzi2731)  Requested for: 29IRX1969; Last    Rx:02Jun2017 Ordered   21  Vitamin D3 5000 UNIT Oral Capsule; Take 2 capsules daily; Therapy: 27Xob4191 to (Evaluate:29Gjr1333)  Requested for: 11NOU3949; Last    Rx:29Abp1301 Ordered   22  Zolpidem Tartrate 10 MG Oral Tablet; TAKE 1 TABLET AT BEDTIME AS NEEDED; Last    Rx:69Uwq0452 Ordered   23  ZyrTEC Allergy 10 MG Oral Capsule; Take 1 tablet at night; Therapy: 11XER1613 to (Evaluate:57Qwe7363)  Requested for: 89VKO0986; Last    Rx:24Yln7254 Ordered    Allergies    1   No Known Drug Allergies    Signatures   Electronically signed by : Willy Puri, ; Jun 8 2017 10:00AM EST                       (Author)

## 2018-01-15 NOTE — PROGRESS NOTES
Assessment    1  Varicose veins with pain (454 8) (I84 910)    Plan    1  Gradient compression stocking, below knee, 20-30mm Hg, each; Status:Complete;     Done: [id number]   2  VAS LOWER LIMB VENOUS DUPLEX STUDY, WITH REFLUX ASSESSMENT, COMPLETE   BILATERAL; Status:Hold For - Scheduling; Requested for:[id number];    3  Follow-up visit in 3 months Evaluation and Treatment  Follow-up  Status: Hold For -   Scheduling  Requested for: 22MAQ1891    Discussion/Summary  Discussion Summary:   Kash Saleh is a 25-year-old woman with long-standing varicosities bilateral lower extremity  She reportedly had treatment of her left leg approximately 11 years ago here at [place] [address]  She has reported that her varicose veins have worsened after having 4 children  The varicose veins have become more painful and her lower extremity swelling worse as the day progresses  Symptoms are worsened by prolonged standing and menstruation  Clinically she has visible varicosities  There are no signs of superficial thrombophlebitis at the present time  Via a  we discussed the importance of wearing compression stockings  We'll also order a venous reflux study to evaluate for venous insufficiency and subsequently have a returns to the office in 3 months for reassessment  Chief Complaint  Chief Complaint Free Text Note Form: " I am here to have my veins checked " No testing        History of Present Illness  Free Text HPI: Kash Saleh is new to our practice she was referred Dr Augusto Barrera   Patient had EVLT in the left leg approximately 11 years ago   Patient states that she is having pain in her both legs but is worse in the left leg  Patient is having swelling in both legs  Swelling and pain worsened with prolonged standing  She has reported that the varicose veins have worsened after having 4 children  patient is not wearing compression stocking   Patient is elevating her legs that seem help   Patient is having some numbness and tingling in both legs again worse in the left  Patient is not on any blood thinners  Review of Systems  Complete Female - Vasc:   Constitutional: no loss in appetite and feeling tired, but no fever, no recent weight gain and no chills  Eyes: eye pain, dryness of the eyes, itching of the eyes and no sudden vision loss, but no eyesight problems, no purulent discharge from the eyes, wears glasses, blurred vision and double vision  ENT: sore throat, nasal discharge and hoarseness, but no earache, no nosebleeds and no hearing loss  Cardiovascular: chest pain and palpitations, but regular heart rate, no intermittent leg claudication, painful veins and leg pain with walking  Respiratory: no orthopnea and no complaints of PND, but shortness of breath, wheezing, cough and shortness of breath during exertion  Gastrointestinal: nausea and constipation, but No nausea, No vomiting, no diarrhea, no blood in stool, no vomiting, no diarrhea and no blood in stools  hemorrhoids   Genitourinary: dysuria, urinary hesitancy, nocturia, no dysmenorrhea, no vaginal discharge and no unexplained vaginal bleeding, but no genital lesions, no incontinence and hematuria  Musculoskeletal: joint swelling, limb pain, myalgias, joint stiffness and limb swelling, but lumbar pain  Integumentary: itching, dry skin and no ulcers, but no rashes, no skin lesions and no skin wound  Neurological: headache, no dementia, confusion and dizziness, but no numbness, no limb weakness, no convulsions, no fainting and no difficulty walking  Psychiatric: depression and anxiety, but mood disorder  Hematologic/Lymphatic: no bleeding disorder, no easy bruising  ROS Reviewed:   ROS reviewed  Active Problems    1  Anxiety (300 00) (F41 9)   2  Asthma (493 90) (J45 909)   3  Breast cancer screening (V76 10) (Z12 39)   4  Chronic fatigue (780 79) (R53 82)   5  Chronic low back pain (724 2,338 29) (M54 5,G89 29)   6  Depression (311) (F32 9)   7  's permit physical examination (V70 3) (Z02 4)   8  Dysuria (788 1) (R30 0)   9  Easy bruisability (782 9) (R23 8)   10  Encounter for screening mammogram for malignant neoplasm of breast (V76 12)    (Z12 31)   11  Esophageal reflux (530 81) (K21 9)   12  Full body hives (708 8) (L50 9)   13  Hematuria (599 70) (R31 9)   14  Microscopic hematuria (599 72) (R31 2)   15  Need for diphtheria-tetanus-pertussis (Tdap) vaccine (V06 1) (Z23)   16  Need for prophylactic vaccination and inoculation against influenza (V04 81) (Z23)   17  Need for TD vaccine (V06 5) (Z23)   18  Need for vaccination for pneumococcus (V03 82) (Z23)   19  Nephrolithiasis (592 0) (N20 0)   20  Nicotine use disorder (305 1) (F17 200)   21  Pap smear for cervical cancer screening (V76 2) (Z12 4)   22  Pyelonephritis (590 80) (N12)   23  Recurrent UTI (599 0) (N39 0)   24  Urinary tract infection (599 0) (N39 0)   25  Varicose veins with pain (454 8) (I83 819)   26  Vitamin D deficiency (268 9) (E55 9)    Past Medical History    1  Anxiety (300 00) (F41 9)   2  Asthma (493 90) (J45 909)   3  History of Gastritis (535 50) (K29 70)  Active Problems And Past Medical History Reviewed: The active problems and past medical history were reviewed and updated today  Surgical History  Surgical History Reviewed: The surgical history was reviewed and updated today  Family History  Mother    1  Family history of Colon cancer, ascending   2  Family history of asthma (V17 5) (Z82 5)   3  Family history of coronary artery disease (V17 3) (Z82 49)   4  Family history of depression (V17 0) (Z81 8)   5  Family history of diabetes mellitus (V18 0) (Z83 3)   6  Family history of hypertension (V17 49) (Z82 49)   7  Family history of lung cancer (V16 1) (Z80 1)   8  Family history of malignant neoplasm of breast (V16 3) (Z80 3)   9  Family history of varicose veins (V17 49) (Z82 49)  Father    8  Family history of asthma (V17 5) (Z82 5)   11   Family history of hypertension (V17 49) (Z82 49)   12  Family history of lung cancer (V16 1) (Z80 1)  Family History Reviewed: The family history was reviewed and updated today  Social History    · Denied: History of Alcohol use   · Current every day smoker (305 1) (F17 200)   · Denied: History of Drug use  Social History Reviewed: The social history was reviewed and updated today  Current Meds   1  BuPROPion HCl ER (SR) 150 MG Oral Tablet Extended Release 12 Hour; take 1 tablet by   mouth twice a day  Requested for: 64NXN4142; Last Rx:10Mar2016 Ordered   2  Cetirizine HCl - 10 MG Oral Tablet Recorded   3  ClonazePAM 1 MG Oral Tablet; TAKE 1 pill twice a day; Last Rx:10Mar2016 Ordered   4  Fluconazole 150 MG Oral Tablet Recorded   5  FLUoxetine HCl - 20 MG Oral Tablet; Take 2 pills in the morning  Requested for:   62XSQ6750; Last Rx:10Mar2016 Ordered   6  HydrOXYzine HCl - 25 MG Oral Tablet; TAKE 2 TABLETS AT NIGHT; Therapy: 00GEB8989 to (Evaluate:26Jun2016)  Requested for: 28Mar2016; Last   Rx:28Mar2016 Ordered   7  Methocarbamol 750 MG Oral Tablet; Take 1 pill at night for muscle spasm Recorded   8  Omeprazole 40 MG Oral Capsule Delayed Release; take one capsule by mouth daily; Therapy: 96PRF2700 to (Evaluate:21Zxm4031)  Requested for: 96MIR9997; Last   Rx:18Feb2016 Ordered   9  Oxycodone-Acetaminophen 5-325 MG Oral Tablet Recorded   10  Phentermine HCl - 37 5 MG Oral Tablet Recorded   11  Sprintec 28 TABS Recorded   12  TraMADol HCl - 50 MG Oral Tablet; TAKE 1 TABLET 3 TIMES DAILY AS NEEDED    Recorded   13  Ventolin  (90 Base) MCG/ACT Inhalation Aerosol Solution; INHALE 2 PUFFS    EVERY 4-6 HOURS AS NEEDED  Requested for: 82FYS6577; Last Rx:01Oct2015    Ordered   14  Vitamin D3 5000 UNIT Oral Capsule; Take 2 capsules daily; Therapy: 50Moz0942 to (Evaluate:71Ffy1936)  Requested for: 99OVF4120; Last    Rx:60Rwr8775 Ordered   15   Zolpidem Tartrate 10 MG Oral Tablet; TAKE 1 TABLET AT BEDTIME AS NEEDED; Last    Rx:10Mar2016 Ordered   16  ZyrTEC Allergy 10 MG Oral Capsule; Take 1 tablet at night; Therapy: 91XPA6724 to ([employer])  Requested for: 30NGE8266; Last    Rx:05Jan2016 Ordered  Medication List Reviewed: The medication list was reviewed and updated today  Allergies    1  No Known Drug Allergies    Vitals  Vital Signs [Data Includes: Current Encounter]    Recorded: 92ZCM9379 01:15PM   Heart Rate 80, L Radial   Pulse Quality Normal, L Radial   Respiration 16   Systolic 508, LUE, Sitting   Diastolic 76, LUE, Sitting   Height 5 ft 3 in   Weight 205 lb    BMI Calculated 36 31   BSA Calculated 1 95     Physical Exam    Posterior tibialis: right 1+ and left 1+  Dorsalis pedis: right 1+ and left 1+  Distal Pulse Exam: Normal Capillary Refill  LE Varicose Veins: right leg truncal veins, left leg truncal veins, right leg reticular veins, left leg reticular veins, right leg spider veins and left leg spider veins  The heart rate was normal  The rhythm was regular  Pulmonary   Respiratory effort: No increased work of breathing or signs of respiratory distress  Auscultation of lungs: Clear to auscultation  No wheezing, no rales, no rhonchi  Abdomen   Abdomen: Abdomen soft, non-tender, no masses, non distended, no rebound tenderness  Obese  Psychiatric   Orientation to person, place and time: Normal    Mood and affect: Normal    Eyes   Conjunctiva and lids: No swelling, erythema, or discharge  Ears, Nose, Mouth, and Throat   Hearing: Normal    Oropharynx: Normal with no erythema, edema, exudate or lesions  Lips, teeth, and gums: Normal, good dentition  Neck   Neck: No jugular venous distention, normal ROM and extension  No cervical adenopathy, trachea midline, neck supple  Neurologic Sensory exam normal   Cranial nerves: 2-12 intact  Motor skills intact     Musculoskeletal   Gait and station: Normal    Range of motion: Normal    Muscle strength/tone: Normal       Future Appointments    Date/Time Provider Specialty Site   05/31/2016 01:00 PM Shani Nuñez, [address] [facility]   06/01/2016 02:00 PM Yonny Carreno MD Urology [address]     Signatures   Electronically signed by : Bruce Linares DO; May 23 2016  1:39PM EST                       (Author)

## 2018-01-16 NOTE — MISCELLANEOUS
Provider Comments  Provider Comments:   Patient no showed to appointment on 9/29/2016   I F      Signatures   Electronically signed by : RILEY Perkins; Sep 29 2016  4:00PM EST                       (Author)

## 2018-01-22 VITALS
DIASTOLIC BLOOD PRESSURE: 72 MMHG | OXYGEN SATURATION: 99 % | HEART RATE: 86 BPM | WEIGHT: 222 LBS | TEMPERATURE: 96.5 F | HEIGHT: 62 IN | SYSTOLIC BLOOD PRESSURE: 120 MMHG | RESPIRATION RATE: 18 BRPM | BODY MASS INDEX: 40.85 KG/M2

## 2018-01-22 VITALS
HEIGHT: 62 IN | SYSTOLIC BLOOD PRESSURE: 120 MMHG | HEART RATE: 88 BPM | DIASTOLIC BLOOD PRESSURE: 82 MMHG | RESPIRATION RATE: 16 BRPM

## 2018-03-17 DIAGNOSIS — G89.29 CHRONIC BILATERAL LOW BACK PAIN, WITH SCIATICA PRESENCE UNSPECIFIED: Primary | ICD-10-CM

## 2018-03-17 DIAGNOSIS — M54.5 CHRONIC BILATERAL LOW BACK PAIN, WITH SCIATICA PRESENCE UNSPECIFIED: Primary | ICD-10-CM

## 2018-03-17 RX ORDER — DULOXETIN HYDROCHLORIDE 30 MG/1
CAPSULE, DELAYED RELEASE ORAL
Qty: 30 CAPSULE | Refills: 2 | Status: SHIPPED | OUTPATIENT
Start: 2018-03-17 | End: 2018-09-02

## 2018-04-02 DIAGNOSIS — M62.838 MUSCLE SPASM: Primary | ICD-10-CM

## 2018-04-02 RX ORDER — TIZANIDINE 2 MG/1
TABLET ORAL
Qty: 90 TABLET | Refills: 0 | Status: SHIPPED | OUTPATIENT
Start: 2018-04-02 | End: 2018-09-02

## 2018-04-19 DIAGNOSIS — I10 BENIGN HYPERTENSION: Primary | ICD-10-CM

## 2018-04-20 RX ORDER — HYDROCHLOROTHIAZIDE 12.5 MG/1
TABLET ORAL
Qty: 30 TABLET | Refills: 0 | Status: SHIPPED | OUTPATIENT
Start: 2018-04-20 | End: 2018-09-02

## 2018-04-27 LAB
ACETAMINOPHEN LEVEL (HISTORICAL): < 10 MCQ/M
ACETAMINOPHEN LEVEL (HISTORICAL): < 10 MCQ/M
ALBUMIN SERPL BCP-MCNC: 4.5 G/DL (ref 3.5–5.7)
ALP SERPL-CCNC: 104 IU/L (ref 40–150)
ALT SERPL W P-5'-P-CCNC: 24 IU/L (ref 0–50)
AMPHETAMINES (HISTORICAL): NEGATIVE
ANION GAP SERPL CALCULATED.3IONS-SCNC: 12 MM/L
APTT PPP: 28.2 SEC (ref 24.4–37.6)
AST SERPL W P-5'-P-CCNC: 20 U/L (ref 7–26)
BACTERIA UR QL AUTO: ABNORMAL /HPF
BARBITURATES (HISTORICAL): NEGATIVE
BASOPHILS # BLD AUTO: 0.1 X3/UL (ref 0–0.3)
BASOPHILS # BLD AUTO: 0.5 % (ref 0–2)
BENZODIAZEPINES (HISTORICAL): NEGATIVE
BILIRUB SERPL-MCNC: 0.5 MG/DL (ref 0.3–1)
BILIRUB UR QL STRIP: ABNORMAL
BUN SERPL-MCNC: 18 MG/DL (ref 7–25)
CALCIUM SERPL-MCNC: 9.5 MG/DL (ref 8.6–10.5)
CANNABINOIDS (HISTORICAL): NEGATIVE
CHLORIDE SERPL-SCNC: 104 MM/L (ref 98–107)
CLARITY UR: CLEAR
CO2 SERPL-SCNC: 28 MM/L (ref 21–31)
COCAINE (HISTORICAL): NEGATIVE
COLOR UR: YELLOW
CREAT SERPL-MCNC: 1 MG/DL (ref 0.6–1.2)
DEPRECATED RDW RBC AUTO: 13.5 %
EGFR (HISTORICAL): 60 GFR
EGFR AFRICAN AMERICAN (HISTORICAL): > 60 GFR
EOSINOPHIL # BLD AUTO: 0.2 X3/UL (ref 0–0.5)
EOSINOPHIL NFR BLD AUTO: 1.6 % (ref 0–5)
GLUCOSE (HISTORICAL): 192 MG/DL (ref 65–99)
GLUCOSE UR STRIP-MCNC: NEGATIVE MG/DL
HCT VFR BLD AUTO: 42.2 % (ref 37–47)
HGB BLD-MCNC: 14 G/DL (ref 12–16)
HGB UR QL STRIP.AUTO: ABNORMAL
INR PPP: 1.14 (ref 0.9–1.5)
KETONES UR STRIP-MCNC: NEGATIVE MG/DL
LDL CHOLESTEROL DIRECT (HISTORICAL): 133 MG/DL
LDL CHOLESTEROL DIRECT (HISTORICAL): 133 MG/DL
LEUKOCYTE ESTERASE UR QL STRIP: ABNORMAL
LYMPHOCYTES # BLD AUTO: 5 X3/UL (ref 1.2–4.2)
LYMPHOCYTES NFR BLD AUTO: 37.7 % (ref 20.5–51.1)
MCH RBC QN AUTO: 30.3 PG (ref 26–34)
MCHC RBC AUTO-ENTMCNC: 33.1 G/DL (ref 31–37)
MCV RBC AUTO: 91.3 FL (ref 81–99)
METHADONE (HISTORICAL): NEGATIVE
MONOCYTES # BLD AUTO: 0.8 X3/UL (ref 0–1)
MONOCYTES NFR BLD AUTO: 6 % (ref 1.7–12)
NEUTROPHILS # BLD AUTO: 7.2 X3/UL (ref 1.4–6.5)
NEUTS SEG NFR BLD AUTO: 54.2 % (ref 42.2–75.2)
NITRITE UR QL STRIP: NEGATIVE
NON-SQ EPI CELLS URNS QL MICRO: ABNORMAL /LPF
OPIATES (HISTORICAL): POSITIVE
OSMOLALITY, SERUM (HISTORICAL): 286 MOSM (ref 262–291)
OXYCODONE (HISTORICAL): POSITIVE
PH UR STRIP.AUTO: 7 [PH] (ref 4.5–8)
PHENCYCLIDINE URINE (HISTORICAL): NEGATIVE
PLATELET # BLD AUTO: 284 X3/UL (ref 130–400)
PLEASE NOTE (HISTORICAL): NORMAL
PMV BLD AUTO: 8.5 FL
POTASSIUM SERPL-SCNC: 4 MM/L (ref 3.5–5.5)
PROPOXYPHENE (HISTORICAL): NEGATIVE
PROT UR STRIP-MCNC: ABNORMAL MG/DL
PROTHROMBIN TIME (HISTORICAL): 13.1 SEC (ref 10.1–12.9)
RBC # BLD AUTO: 4.62 X6/UL (ref 3.9–5.2)
RBC #/AREA URNS AUTO: ABNORMAL /HPF
SODIUM SERPL-SCNC: 140 MM/L (ref 134–143)
SP GR UR STRIP.AUTO: 1.02 (ref 1–1.03)
TOTAL PROTEIN (HISTORICAL): 7.4 G/DL (ref 6.4–8.9)
UROBILINOGEN UR QL STRIP.AUTO: 2 EU/DL (ref 0.2–8)
WBC # BLD AUTO: 13.3 X3/UL (ref 4.8–10.8)
WBC #/AREA URNS AUTO: ABNORMAL /HPF

## 2018-09-02 ENCOUNTER — HOSPITAL ENCOUNTER (EMERGENCY)
Facility: HOSPITAL | Age: 43
Discharge: HOME | End: 2018-09-02
Attending: EMERGENCY MEDICINE
Payer: COMMERCIAL

## 2018-09-02 VITALS
DIASTOLIC BLOOD PRESSURE: 96 MMHG | OXYGEN SATURATION: 100 % | BODY MASS INDEX: 38.07 KG/M2 | TEMPERATURE: 97.9 F | SYSTOLIC BLOOD PRESSURE: 165 MMHG | RESPIRATION RATE: 16 BRPM | HEIGHT: 64 IN | WEIGHT: 223 LBS

## 2018-09-02 DIAGNOSIS — K08.89 PAIN, DENTAL: Primary | ICD-10-CM

## 2018-09-02 PROCEDURE — 99282 EMERGENCY DEPT VISIT SF MDM: CPT

## 2018-09-02 RX ORDER — PENICILLIN V POTASSIUM 250 MG/1
500 TABLET ORAL ONCE
Status: COMPLETED | OUTPATIENT
Start: 2018-09-02 | End: 2018-09-02

## 2018-09-02 RX ORDER — OXYCODONE HYDROCHLORIDE AND ACETAMINOPHEN 5; 325 MG/1; MG/1
1 TABLET ORAL ONCE
Status: COMPLETED | OUTPATIENT
Start: 2018-09-02 | End: 2018-09-02

## 2018-09-02 RX ORDER — PENICILLIN V POTASSIUM 500 MG/1
500 TABLET ORAL 4 TIMES DAILY
Qty: 40 TABLET | Refills: 0 | Status: SHIPPED | OUTPATIENT
Start: 2018-09-02 | End: 2018-09-09

## 2018-09-02 RX ADMIN — OXYCODONE HYDROCHLORIDE AND ACETAMINOPHEN 1 TABLET: 5; 325 TABLET ORAL at 22:46

## 2018-09-02 RX ADMIN — PENICILLIN V POTASSIUM 500 MG: 250 TABLET, FILM COATED ORAL at 22:44

## 2018-09-03 NOTE — DISCHARGE INSTRUCTIONS
Toothache   WHAT YOU NEED TO KNOW:   A toothache is pain that is caused by irritation of the nerves in the center of your tooth  The irritation may be caused by several problems, such as a cavity, an infection, a cracked tooth, or gum disease  It is very important to follow up with your dentist so the cause of your toothache can be diagnosed and treated  This can help prevent more serious problems  DISCHARGE INSTRUCTIONS:   Medicines: You may  need any of the following:  · NSAIDs  decrease swelling and pain  This medicine can be bought with or without a doctor's order  This medicine can cause stomach bleeding or kidney problems in certain people  If you take blood thinner medicine, always ask your healthcare provider if NSAIDs are safe for you  Always read the medicine label and follow the directions on it before using this medicine  · Acetaminophen  decreases pain  It is available without a doctor's order  Ask how much to take and how often to take it  Follow directions  Acetaminophen can cause liver damage if not taken correctly  · Pain medicine  may be given as a pill or as medicine that you put directly on your tooth or gums  Do not wait until the pain is severe before you take this medicine  · Antibiotics  help fight or prevent an infection caused by bacteria  Take them as directed  · Take your medicine as directed  Contact your healthcare provider if you think your medicine is not helping or if you have side effects  Tell him of her if you are allergic to any medicine  Keep a list of the medicines, vitamins, and herbs you take  Include the amounts, and when and why you take them  Bring the list or the pill bottles to follow-up visits  Carry your medicine list with you in case of an emergency  Follow up with your dentist as directed: You may be referred to a dental surgeon  Write down your questions so you remember to ask them during your visits     Self-care:   · Rinse your mouth with warm salt water 4 times a day or as directed  · You may need to eat soft foods to help relieve pain caused by chewing  Contact your dentist if:   · You have questions or concerns about your condition or care  Return to the emergency department if:   · You have trouble breathing  · You have swelling in your face or neck  · You have a fever and chills  · You have trouble speaking or swallowing  · You have trouble opening or closing your mouth  © 2017 [facility] Information is for End User's use only and may not be sold, redistributed or otherwise used for commercial purposes  All illustrations and images included in CareNotes® are the copyrighted property of A D A M , Inc  or [employer]  The above information is an  only  It is not intended as medical advice for individual conditions or treatments  Talk to your doctor, nurse or pharmacist before following any medical regimen to see if it is safe and effective for you

## 2018-09-03 NOTE — ED PROVIDER NOTES
History  Chief Complaint   Patient presents with    Dental Pain     3 days bottom right toothache     17-year-old female presents complaining of 3 days of right lower tooth pain  She says she has not seen a dentist   She has had no fevers or chills  She does not have trismus  She had no signs of infection she is here for pain relief            None       Past Medical History:   Diagnosis Date    Anemia     Anxiety     Arthritis     hands, knee    Asthma     uses inhalers for the same   No recnt flairups    Back pain     Carpal tunnel syndrome     Bilateral    Constipation     Depression     Environmental allergies     GERD (gastroesophageal reflux disease)     H/O cardiac murmur     Insomnia     Kidney stone     Migraine     Psychiatric disorder     depression    Psychiatric disorder     anxiety    Pyelonephritis     Varicose veins of both lower extremities with pain     Wears glasses        Past Surgical History:   Procedure Laterality Date    BACK SURGERY      States she isn't sure what she had done-possibly something to do with a lump or muscle    CHOLECYSTECTOMY      CYSTOSCOPY      onset: 6/1/16, resolved: 6/1/16    ESOPHAGOGASTRODUODENOSCOPY      AL LIGATN LONG SAPHENOUS VEIN AT [facility] Left 12/22/2016    Procedure: LIGATION/STRIPPING VEIN, LIGATION OF ANTERIOR TRIBUTARY GREATER SAPHENOUS VEIN BRANCH;  Surgeon: Kimber Hamilton DO;  [facility];  Service: Vascular    AL PHLEB VEINS - EXTREM - TO 20 Left 12/22/2016    Procedure: MULTIPLE STAB PHLEBECTOMIES;  Surgeon: Kimber Hamilton DO;  [facility];  Service: Vascular    TUBAL LIGATION      VARICOSE VEIN SURGERY Left     Left leg    VEIN LIGATION Right 6/5/2017    Procedure: LIGATION GREATER SAPHENOUS VEIN TRIBUTARY WITH STAB PHLEBECTOMIES ;  Surgeon: Kimber Hamilton DO;  [facility];  Service:        Family History   Problem Relation Age of Onset    Colon cancer Mother         ascending  Asthma Mother     Coronary artery disease Mother     Depression Mother     Diabetes Mother     Hypertension Mother     Lung cancer Mother     Breast cancer Mother     Varicose Veins Mother     Asthma Father     Hypertension Father     Lung cancer Father      I have reviewed and agree with the history as documented  Social History   Substance Use Topics    Smoking status: Current Every Day Smoker     Packs/day: 0 50     Types: Cigarettes    Smokeless tobacco: Never Used    Alcohol use No        Review of Systems   Constitutional: Negative for chills, fatigue, fever and unexpected weight change  HENT: Negative for congestion and nosebleeds  Eyes: Negative for visual disturbance  Respiratory: Negative for chest tightness and shortness of breath  Cardiovascular: Negative for chest pain, palpitations and leg swelling  Gastrointestinal: Negative for abdominal pain, blood in stool, diarrhea, nausea and vomiting  Endocrine: Negative for cold intolerance and heat intolerance  Genitourinary: Negative for difficulty urinating  Musculoskeletal: Negative for arthralgias, back pain, gait problem, joint swelling and myalgias  Skin: Negative for rash  Neurological: Negative for dizziness, speech difficulty, weakness and headaches  Psychiatric/Behavioral: Negative for behavioral problems, confusion, self-injury and suicidal ideas  All other systems reviewed and are negative  Physical Exam  Physical Exam   Constitutional: She is oriented to person, place, and time  She appears well-developed and well-nourished  HENT:   Head: Normocephalic and atraumatic  Nose: Nose normal    Mouth/Throat:       Eyes: EOM are normal  Pupils are equal, round, and reactive to light  Neck: Normal range of motion  Neck supple  Cardiovascular: Normal rate, regular rhythm and normal heart sounds  Exam reveals no gallop and no friction rub  No murmur heard    Pulmonary/Chest: Effort normal and breath sounds normal  No respiratory distress  She has no wheezes  She has no rales  Abdominal: Soft  She exhibits no distension  There is no tenderness  There is no rebound and no guarding  Musculoskeletal: Normal range of motion  She exhibits no edema  Neurological: She is alert and oriented to person, place, and time  Skin: Skin is warm and dry  Psychiatric: She has a normal mood and affect  Her behavior is normal  Judgment and thought content normal    Nursing note and vitals reviewed  Vital Signs  ED Triage Vitals [09/02/18 2221]   Temperature Pulse Respirations Blood Pressure SpO2   97 9 °F (36 6 °C) -- 16 165/96 100 %      Temp src Heart Rate Source Patient Position - Orthostatic VS BP Location FiO2 (%)   -- -- -- -- --      Pain Score       9           Vitals:    09/02/18 2221   BP: 165/96       Visual Acuity      ED Medications  Medications - No data to display    Diagnostic Studies  Results Reviewed     None                 No orders to display              Procedures  Procedures       Phone Contacts  ED Phone Contact    ED Course                               [facility]  CritCare Time    Disposition  Final diagnoses:   None     ED Disposition     None      Follow-up Information    None         Patient's Medications   Discharge Prescriptions    No medications on file     No discharge procedures on file      ED Provider  Electronically Signed by           Erasmo Ley MD  09/02/18 0719

## 2018-11-15 ENCOUNTER — OFFICE VISIT (OUTPATIENT)
Dept: FAMILY MEDICINE CLINIC | Facility: CLINIC | Age: 43
End: 2018-11-15
Payer: COMMERCIAL

## 2018-11-15 VITALS
RESPIRATION RATE: 18 BRPM | OXYGEN SATURATION: 97 % | TEMPERATURE: 97.5 F | HEIGHT: 64 IN | DIASTOLIC BLOOD PRESSURE: 76 MMHG | SYSTOLIC BLOOD PRESSURE: 118 MMHG | BODY MASS INDEX: 38.98 KG/M2 | HEART RATE: 92 BPM | WEIGHT: 228.31 LBS

## 2018-11-15 DIAGNOSIS — Z23 NEED FOR INFLUENZA VACCINATION: ICD-10-CM

## 2018-11-15 DIAGNOSIS — E66.01 MORBID OBESITY (HCC): ICD-10-CM

## 2018-11-15 DIAGNOSIS — E55.9 VITAMIN D DEFICIENCY: ICD-10-CM

## 2018-11-15 DIAGNOSIS — M54.50 CHRONIC RIGHT-SIDED LOW BACK PAIN WITHOUT SCIATICA: ICD-10-CM

## 2018-11-15 DIAGNOSIS — J45.40 MODERATE PERSISTENT ASTHMA WITHOUT COMPLICATION: ICD-10-CM

## 2018-11-15 DIAGNOSIS — G89.29 CHRONIC RIGHT-SIDED LOW BACK PAIN WITHOUT SCIATICA: ICD-10-CM

## 2018-11-15 DIAGNOSIS — F41.9 ANXIETY: Primary | ICD-10-CM

## 2018-11-15 DIAGNOSIS — F33.42 RECURRENT MAJOR DEPRESSIVE DISORDER, IN FULL REMISSION (HCC): ICD-10-CM

## 2018-11-15 DIAGNOSIS — Z12.39 BREAST CANCER SCREENING: ICD-10-CM

## 2018-11-15 PROCEDURE — 90471 IMMUNIZATION ADMIN: CPT

## 2018-11-15 PROCEDURE — 99214 OFFICE O/P EST MOD 30 MIN: CPT | Performed by: PHYSICIAN ASSISTANT

## 2018-11-15 PROCEDURE — 90682 RIV4 VACC RECOMBINANT DNA IM: CPT

## 2018-11-15 RX ORDER — CLONAZEPAM 1 MG/1
1 TABLET ORAL DAILY
COMMUNITY
End: 2018-11-15 | Stop reason: SDUPTHER

## 2018-11-15 RX ORDER — ALBUTEROL SULFATE 90 UG/1
2 AEROSOL, METERED RESPIRATORY (INHALATION) EVERY 6 HOURS PRN
Qty: 18 G | Refills: 0 | Status: SHIPPED | OUTPATIENT
Start: 2018-11-15 | End: 2018-12-12 | Stop reason: SDUPTHER

## 2018-11-15 RX ORDER — OMEPRAZOLE 40 MG/1
1 CAPSULE, DELAYED RELEASE ORAL DAILY
COMMUNITY
Start: 2014-08-15 | End: 2018-11-26

## 2018-11-15 RX ORDER — ZOLPIDEM TARTRATE 10 MG/1
10 TABLET ORAL
COMMUNITY
End: 2018-11-15 | Stop reason: SDUPTHER

## 2018-11-15 RX ORDER — CLONAZEPAM 1 MG/1
1 TABLET ORAL 2 TIMES DAILY
Qty: 45 TABLET | Refills: 0 | Status: SHIPPED | OUTPATIENT
Start: 2018-11-15 | End: 2018-12-14 | Stop reason: SDUPTHER

## 2018-11-15 RX ORDER — LIDOCAINE 40 MG/G
CREAM TOPICAL 3 TIMES DAILY
Qty: 30 G | Refills: 1 | Status: SHIPPED | OUTPATIENT
Start: 2018-11-15 | End: 2018-11-26

## 2018-11-15 RX ORDER — ZOLPIDEM TARTRATE 10 MG/1
10 TABLET ORAL
Qty: 30 TABLET | Refills: 0 | Status: SHIPPED | OUTPATIENT
Start: 2018-11-15 | End: 2018-12-14 | Stop reason: SDUPTHER

## 2018-11-15 RX ORDER — BUPROPION HYDROCHLORIDE 150 MG/1
150 TABLET ORAL DAILY
Qty: 30 TABLET | Refills: 1 | Status: SHIPPED | OUTPATIENT
Start: 2018-11-15 | End: 2019-01-10 | Stop reason: SDUPTHER

## 2018-11-15 RX ORDER — FLUTICASONE PROPIONATE 110 UG/1
2 AEROSOL, METERED RESPIRATORY (INHALATION) 2 TIMES DAILY
Qty: 12 G | Refills: 2 | Status: SHIPPED | OUTPATIENT
Start: 2018-11-15 | End: 2019-03-10 | Stop reason: SDUPTHER

## 2018-11-15 NOTE — PROGRESS NOTES
Assessment/Plan:    Asthma  Recommend starting on flovent daily  Recheck in 3 months  Influenza vaccine given today  Continue to use albuterol PRN  Chronic low back pain  Will try lidocaine cream and referred to pain management    Anxiety  Refilled klonopin until seen by new psychiatry  [place] [facility] [place]  Depression  Continue therapy and wellbutrin           Problem List Items Addressed This Visit        Respiratory    Asthma     Recommend starting on flovent daily  Recheck in 3 months  Influenza vaccine given today  Continue to use albuterol PRN  Relevant Medications    albuterol (VENTOLIN HFA) 90 mcg/act inhaler    fluticasone (FLOVENT HFA) 110 MCG/ACT inhaler       Other    Anxiety - Primary     Refilled klonopin until seen by new psychiatry  [place] [facility] [place]  Relevant Medications    clonazePAM (KlonoPIN) 1 mg tablet    zolpidem (AMBIEN) 10 mg tablet    Chronic low back pain     Will try lidocaine cream and referred to pain management         Relevant Medications    lidocaine (LMX) 4 % cream    Other Relevant Orders    Ambulatory referral to Pain Management    Depression     Continue therapy and wellbutrin           Relevant Medications    zolpidem (AMBIEN) 10 mg tablet    buPROPion (WELLBUTRIN XL) 150 mg 24 hr tablet      Other Visit Diagnoses     Breast cancer screening        Relevant Orders    Mammo screening bilateral w cad    Vitamin D deficiency        Relevant Orders    Comprehensive metabolic panel    Vitamin D 25 hydroxy    Morbid obesity ([facility] [address] )        Relevant Orders    Lipid panel    Need for influenza vaccination        Relevant Orders    influenza vaccine, [id number], quadrivalent, recombinant, PF, 0 5 mL, for patients 18 yr+ (FLUBLOK) (Completed)            Subjective:      Patient ID: Juhi Patel is a 37 y o  female  HPI 36 y/o F here for follow up depression/anxiety    She moved and transferred to new psychiatry  She has to see the therapist first for 2 visits before seeing psychiatrist for medication  She was taking klonopin 1 mg 1-2 times a day , zolpidem 10 mg and wellbutrin 150 at night  Symtoms were well controlled on this regimen  She needs a refill of inhaler  She is using it 2 times a day  This is normal for her  No allergy or URI sytmpoms  She has been getting back pain  It is only on right side  It gets worse when she sleeps, mops  Pain is only in the back   Last rx she had was for tizanidine and it did not help  She was on robaxin prior  She did do PT in the past and it did not help  The following portions of the patient's history were reviewed and updated as appropriate:   She  has a past medical history of Anemia; Anxiety; Arthritis; Asthma; Back pain; Carpal tunnel syndrome; Constipation; Depression; Environmental allergies; GERD (gastroesophageal reflux disease); H/O cardiac murmur; Insomnia; Kidney stone; Migraine; Psychiatric disorder; Pyelonephritis; Varicose veins of both lower extremities with pain; and Wears glasses  She   Patient Active Problem List    Diagnosis Date Noted    Varicose veins of right lower extremities with pain 06/05/2017    Anxiety 10/11/2016    Asthma 10/11/2016    Chronic low back pain 10/11/2016    Depression 10/11/2016    Gastroesophageal reflux disease 10/11/2016    Recurrent urinary tract infection 10/11/2016     She  has a past surgical history that includes Cholecystectomy; Varicose vein surgery (Left); Back surgery; pr phleb veins - extrem - to 20 (Left, 12/22/2016); pr ligatn long saphenous vein at seph-fem junc (Left, 12/22/2016); Tubal ligation; Vein ligation (Right, 6/5/2017); Cystoscopy; and Esophagogastroduodenoscopy    Her family history includes Asthma in her father and mother; Breast cancer in her mother; Colon cancer in her mother; Coronary artery disease in her mother; Depression in her mother; Diabetes in her mother; Hypertension in her father and mother; Lung cancer in her father and mother; Varicose Veins in her mother  She  reports that she has been smoking Cigarettes  She has been smoking about 0 50 packs per day  She has never used smokeless tobacco  She reports that she does not drink alcohol or use drugs  Current Outpatient Prescriptions   Medication Sig Dispense Refill    omeprazole (PriLOSEC) 40 MG capsule Take 1 capsule by mouth daily      albuterol (VENTOLIN HFA) 90 mcg/act inhaler Inhale 2 puffs every 6 (six) hours as needed for wheezing 18 g 0    buPROPion (WELLBUTRIN XL) 150 mg 24 hr tablet Take 1 tablet (150 mg total) by mouth daily 30 tablet 1    clonazePAM (KlonoPIN) 1 mg tablet Take 1 tablet (1 mg total) by mouth 2 (two) times a day 45 tablet 0    fluticasone (FLOVENT HFA) 110 MCG/ACT inhaler Inhale 2 puffs 2 (two) times a day Rinse mouth after use  12 g 2    lidocaine (LMX) 4 % cream Apply topically 3 (three) times a day 30 g 1    zolpidem (AMBIEN) 10 mg tablet Take 1 tablet (10 mg total) by mouth daily at bedtime 30 tablet 0     No current facility-administered medications for this visit  No current outpatient prescriptions on file prior to visit  No current facility-administered medications on file prior to visit  She has No Known Allergies       Review of Systems   Constitutional: Negative for activity change, appetite change, chills, fatigue and unexpected weight change  HENT: Negative for hearing loss and sore throat  Eyes: Negative for visual disturbance  Respiratory: Positive for wheezing  Negative for cough  Cardiovascular: Negative for chest pain  Gastrointestinal: Negative for abdominal pain, constipation, diarrhea and vomiting  Genitourinary: Negative for difficulty urinating and dysuria  Musculoskeletal: Positive for back pain  Negative for arthralgias and myalgias  Skin: Negative for rash  Neurological: Negative for dizziness and headaches  Psychiatric/Behavioral: Negative for behavioral problems  Objective:      /76 (BP Location: Left arm, Patient Position: Sitting, Cuff Size: Large)   Pulse 92   Temp 97 5 °F (36 4 °C) (Tympanic)   Resp 18   Ht 5' 4" (1 626 m)   Wt 104 kg (228 lb 5 oz)   SpO2 97%   BMI 39 19 kg/m²          Physical Exam   Constitutional: She is oriented to person, place, and time  She appears well-developed and well-nourished  No distress  HENT:   Head: Normocephalic and atraumatic  Right Ear: External ear normal    Left Ear: External ear normal    Eyes: Conjunctivae are normal    Neck: Normal range of motion  Neck supple  No thyromegaly present  Cardiovascular: Normal rate, regular rhythm and normal heart sounds  No murmur heard  Pulmonary/Chest: Effort normal and breath sounds normal  No respiratory distress  She has no wheezes  Musculoskeletal: She exhibits tenderness (tender over right paraspinal muscle with spasm)  Lymphadenopathy:     She has no cervical adenopathy  Neurological: She is alert and oriented to person, place, and time  Psychiatric: She has a normal mood and affect  Her behavior is normal    Nursing note and vitals reviewed

## 2018-11-15 NOTE — ASSESSMENT & PLAN NOTE
Recommend starting on flovent daily  Recheck in 3 months  Influenza vaccine given today  Continue to use albuterol PRN

## 2018-11-26 ENCOUNTER — OFFICE VISIT (OUTPATIENT)
Dept: PAIN MEDICINE | Facility: CLINIC | Age: 43
End: 2018-11-26
Payer: COMMERCIAL

## 2018-11-26 VITALS
TEMPERATURE: 97.7 F | DIASTOLIC BLOOD PRESSURE: 84 MMHG | HEIGHT: 64 IN | RESPIRATION RATE: 18 BRPM | BODY MASS INDEX: 38.58 KG/M2 | WEIGHT: 226 LBS | SYSTOLIC BLOOD PRESSURE: 132 MMHG

## 2018-11-26 DIAGNOSIS — M54.42 CHRONIC BILATERAL LOW BACK PAIN WITH BILATERAL SCIATICA: Primary | ICD-10-CM

## 2018-11-26 DIAGNOSIS — G89.4 CHRONIC PAIN SYNDROME: ICD-10-CM

## 2018-11-26 DIAGNOSIS — M54.41 CHRONIC BILATERAL LOW BACK PAIN WITH BILATERAL SCIATICA: Primary | ICD-10-CM

## 2018-11-26 DIAGNOSIS — M79.18 MYOFASCIAL PAIN SYNDROME: ICD-10-CM

## 2018-11-26 DIAGNOSIS — G89.29 CHRONIC BILATERAL LOW BACK PAIN WITH BILATERAL SCIATICA: Primary | ICD-10-CM

## 2018-11-26 DIAGNOSIS — M47.816 LUMBAR SPONDYLOSIS: ICD-10-CM

## 2018-11-26 PROBLEM — M51.26 LUMBAR DISC HERNIATION: Status: ACTIVE | Noted: 2018-11-26

## 2018-11-26 PROCEDURE — 99204 OFFICE O/P NEW MOD 45 MIN: CPT | Performed by: ANESTHESIOLOGY

## 2018-11-26 RX ORDER — BACLOFEN 10 MG/1
10 TABLET ORAL 3 TIMES DAILY
Qty: 90 TABLET | Refills: 1 | Status: SHIPPED | OUTPATIENT
Start: 2018-11-26 | End: 2019-02-05 | Stop reason: SDUPTHER

## 2018-11-26 RX ORDER — DICLOFENAC POTASSIUM 50 MG/1
50 TABLET, FILM COATED ORAL 3 TIMES DAILY
Qty: 90 TABLET | Refills: 1 | Status: SHIPPED | OUTPATIENT
Start: 2018-11-26 | End: 2019-07-15

## 2018-12-03 ENCOUNTER — EVALUATION (OUTPATIENT)
Dept: PHYSICAL THERAPY | Facility: CLINIC | Age: 43
End: 2018-12-03
Payer: COMMERCIAL

## 2018-12-03 DIAGNOSIS — G89.29 CHRONIC BILATERAL LOW BACK PAIN WITH BILATERAL SCIATICA: Primary | ICD-10-CM

## 2018-12-03 DIAGNOSIS — M54.42 CHRONIC BILATERAL LOW BACK PAIN WITH BILATERAL SCIATICA: Primary | ICD-10-CM

## 2018-12-03 DIAGNOSIS — M54.41 CHRONIC BILATERAL LOW BACK PAIN WITH BILATERAL SCIATICA: Primary | ICD-10-CM

## 2018-12-03 PROCEDURE — 97162 PT EVAL MOD COMPLEX 30 MIN: CPT | Performed by: PHYSICAL THERAPIST

## 2018-12-03 PROCEDURE — G8981 BODY POS CURRENT STATUS: HCPCS | Performed by: PHYSICAL THERAPIST

## 2018-12-03 PROCEDURE — 97110 THERAPEUTIC EXERCISES: CPT | Performed by: PHYSICAL THERAPIST

## 2018-12-03 PROCEDURE — G8982 BODY POS GOAL STATUS: HCPCS | Performed by: PHYSICAL THERAPIST

## 2018-12-03 NOTE — PROGRESS NOTES
PT Evaluation     Today's date: 12/3/2018  Patient name: Jillian Officer  : 1975  MRN: [id number]  Referring provider: Raz Garza MD  Dx:   Encounter Diagnosis     ICD-10-CM    1  Chronic bilateral low back pain with bilateral sciatica M54 42 Ambulatory referral to Physical Therapy    M54 41     G89 29        Start Time: 1405  Stop Time: 1500  Total time in clinic (min): 55 minutes    Assessment  Assessment details: Jillian Officer is a 37 y o  female with a history of anxiety, asthma, arthritis, depression, insomnia, varicose veins, and tobacco use that presents for a moderate complexity physical therapy initial evaluation  The patient demonstrates signs and symptoms consistent with B/L chronic low back pain with B/L sciatica  During the examination the patient demonstrated decreased core strength, decreased ROM,decreased activity tolerance, and back pain  The patient's impairments are causing the following functional limitations: difficulty bending, sitting,standing,walking for prolonged periods of times, pain/difficulty sleeping, unable to lift/carry items heavier than 10 pounds, difficulty performing house hold tasks like dishes, mopping floors, and cooking     The patient's clinical presentation is evolving due to the chronicity of the condition, medical history, and functional limitations  The patient will benefit from skilled PT services to address impairments, work towards goals, and restore PLOF  Impairments: abnormal or restricted ROM, activity intolerance, impaired physical strength, lacks appropriate home exercise program, pain with function and poor body mechanics  Functional limitations: difficulty bending, sitting,standing,walking for prolonged periods of times, pain/difficulty sleeping, unable to lift/carry items heavier than 10 pounds, difficulty performing house hold tasks like dishes, mopping floors, and cooking    Symptom irritability: highBarriers to therapy: Chronicity of condition/ medical history  Understanding of Dx/Px/POC: fair   Prognosis: fair    Goals  STG: Achieve in 4 weeks  1  Decrease lumbar pain at worst by 50% to improve activity tolerance for self/care and leisure activities  2   Improve transverse abdominal/ Glute activation from poor to fair to improve ability to change body positions without difficulty  3   Improve SLR/ lumbar extension ROM by 10-15 degrees to facilitate normal movement patterns for ADL's  LTG: Achieve in 6-8 weeks  1  Patient to return to near [facility] as indicated by an increase in activity tolerance  Patient to tolerate washing dishes without back pain greater than 5/10   2   Patient tolerate sleeping and waking up with pain less than 4/10 in intensity to achieve patient specific goal   3   Patient achieve independence with performing home exercise plan  Plan  Plan details: The patient reports having no questions about the plan of care/examination at the end of the session  Planned modality interventions: TENS, thermotherapy: hydrocollator packs, cryotherapy, unattended electrical stimulation, ultrasound and traction  Planned therapy interventions: manual therapy, massage, neuromuscular re-education, patient education, self care, strengthening, stretching, therapeutic exercise, home exercise program, gait training, body mechanics training, balance, abdominal trunk stabilization and activity modification  Frequency: 2-3x/week  Duration in visits: 16  Duration in weeks: 8  Plan of Care beginning date: 12/3/2018  Plan of Care expiration date: 1/28/2019  Treatment plan discussed with: patient        Subjective Evaluation    History of Present Illness  Date of onset: 12/1/1997  Mechanism of injury: Ban Camacho is a 37 y o  female that presents to outpatient physical therapy with complaints of B/L low back pain which is chronic with an initial onset of [id number] per the patient    The patient complains of R>L low back pain at her paraspinal area which can cause B/L buttock paraesthesias and hip pain  The patient has difficulty washing dishes, mopping the floor, and sleeping because of this pain  The patient reports she saw a pain doctor last week who changed the patient's medication and referred the patient to outpatient PT for core strengthening  The patient has started a trial of diclofenac and baclofen for her pain  The patient's main goal for physical therapy is to reduce her pain when sleeping, doing the dishes, and taking care of her household  Recurrent probem    Pain  Current pain rating: 3  At best pain rating: 3  At worst pain rating: 10  Location: B/L lumbar paraspinals, buttocks, and lateral hips  Quality: burning  Relieving factors: medications  Aggravating factors: standing, sitting, walking and lifting  Progression: worsening    Social Support  Steps to enter house: no  Stairs in house: no   Lives with: spouse and young children    Employment status: not working  Hand dominance: right      Diagnostic Tests  MRI studies: abnormal (  MRI lumbar spine was reviewed and shows diffuse disc bulge at L4-L5 and L5-S1 with minimal spondylosis )  Treatments  Previous treatment: medication  Current treatment: physical therapy  Patient Goals  Patient goals for therapy: decreased pain, independence with ADLs/IADLs, increased strength, improved balance, return to sport/leisure activities and increased motion  Patient goal: sleep without pain        Objective     Special Questions  Negative for bladder dysfunction and bowel dysfunction    Palpation   Left   No palpable tenderness to the erector spinae and lumbar paraspinals  Right   No palpable tenderness to the erector spinae and lumbar paraspinals       Additional Palpation Details  No gluteal tenderness noted    Neurological Testing     Sensation     Lumbar   Left   Intact: light touch    Right   Intact: light touch    Active Range of Motion     Lumbar   Flexion: 50 degrees   Extension: 10 degrees with pain  Left lateral flexion: 15 degrees   Right lateral flexion: 15 degrees     Passive Range of Motion     Additional Passive Range of Motion Details  SLR: R: 42 degrees   L:  40 degrees - the patient complained of lateral hip pain with this test    Strength/Myotome Testing     Left Hip   Planes of Motion   Flexion: 4  Extension: 3+  Abduction: 4  Adduction: 4  Internal rotation: 4+    Right Hip   Planes of Motion   Flexion: 4  Extension: 4  Abduction: 4  Adduction: 4  Internal rotation: 4+    Left Knee   Flexion: 4+    Right Knee   Flexion: 4+    Left Ankle/Foot   Dorsiflexion: 4+  Plantar flexion: 4+    Right Ankle/Foot   Dorsiflexion: 4+  Plantar flexion: 4+    Muscle Activation     Additional Muscle Activation Details  Decreased muscle activation at the patient's transverse abdominals, gluteals, multifidus B/L    Tests     Lumbar   Positive repeated extension  Negative repeated flexion  Additional Tests Details  MANUEL: R: positive hip pain   L: Neg    Piriformis stress: R: pain   L: neg    General Comments     Lumbar Comments  The patient's gait is not impaired at this time but the patient does report an inability to walk for long distances more than 10 minutes        Flowsheet Rows      Most Recent Value   PT/OT G-Codes   FOTO information reviewed  No   Assessment Type  Evaluation   G code set  Changing & Maintaining Body Position   Changing and Maintaining Body Position Current Status ()  CK   Changing and Maintaining Body Position Goal Status ()  CJ          Precautions: [place] SPEAKING  RE-EVAL: 12/31/18    Specialty Daily Treatment Diary     Manual  12/3/18       STM lumbar paraspinals                Hamstring stretch B/L        Piriformis Stretch B/L        Hip Flexor Stretch B/L            Exercise Diary  12/3/18       Treadmill Ambulation  *      UBE Stand Alt Nustep with towel roll  *      Core brace  *      Brace with knee fall outs        Brace with Heelslides        Bridges x10:02       Self 90-90 hamstring stretch B/L  *      Prone press ups        Standing back extensions        Quad DLS UE  LE  UE+LE  *      LTR cross legs        Clam shells (back stable) x10 :05 B/L       Quadruped knee lifts  *      Gluteal sets x10  :05               Anti-rotation with TB        MTP/LTP with TB                                    Modalities        Ultrasound Low Back 1MhZ  2 0 w/cm2                          The patient was given a new home exercise plan with written handout, pictures, and verbal instruction  The patient accepts and understands the new home activities

## 2018-12-05 ENCOUNTER — OFFICE VISIT (OUTPATIENT)
Dept: PHYSICAL THERAPY | Facility: CLINIC | Age: 43
End: 2018-12-05
Payer: COMMERCIAL

## 2018-12-05 DIAGNOSIS — M54.42 CHRONIC BILATERAL LOW BACK PAIN WITH BILATERAL SCIATICA: Primary | ICD-10-CM

## 2018-12-05 DIAGNOSIS — G89.29 CHRONIC BILATERAL LOW BACK PAIN WITH BILATERAL SCIATICA: Primary | ICD-10-CM

## 2018-12-05 DIAGNOSIS — M54.41 CHRONIC BILATERAL LOW BACK PAIN WITH BILATERAL SCIATICA: Primary | ICD-10-CM

## 2018-12-05 PROCEDURE — 97110 THERAPEUTIC EXERCISES: CPT | Performed by: PHYSICAL THERAPIST

## 2018-12-05 PROCEDURE — 97140 MANUAL THERAPY 1/> REGIONS: CPT | Performed by: PHYSICAL THERAPIST

## 2018-12-05 NOTE — PROGRESS NOTES
Daily Note     Today's date: 2018  Patient name: Jonnathan Ramirez  : 1975  MRN: [id number]  Referring provider: Brittany Paulson MD  Dx:   Encounter Diagnosis     ICD-10-CM    1  Chronic bilateral low back pain with bilateral sciatica M54 42     M54 41     G89 29        Start Time: 1300  Stop Time: 1355  Total time in clinic (min): 55 minutes    Subjective: Patient states she is having a lot of pain today  "Too much"  Objective: See treatment diary below    Precautions: Frisian SPEAKING  RE-EVAL: 18    Specialty Daily Treatment Diary     Manual  12/3/18 12/5      STM lumbar paraspinals        Calf stretch  performed      Hamstring stretch B/L  performed      Piriformis Stretch B/L  performed      Hip Flexor Stretch B/L            Exercise Diary  12/3/18 12/5      Treadmill Ambulation  *      UBE Stand Alt Nustep with towel roll  Nustep 10' L1      Core brace  10x5" with p ball      Brace with knee fall outs  10x bilat      Brace with Heelslides        Bridges x10:02 10x2"      Self 90-90 hamstring stretch B/L  15"x4 bilateral      Prone press ups        Standing back extensions  10x      Quad DLS UE  LE  UE+LE  UE, LE 10x ea      LTR cross legs        Clam shells (back stable) x10 :05 B/L 2x10 bilat      Quadruped knee lifts  10x      Gluteal sets x10  :05 10x5"              Anti-rotation with TB        MTP/LTP with TB                                    Modalities        Ultrasound Low Back 1MhZ  2 0 w/cm2                          12/3- The patient was given a new home exercise plan with written handout, pictures, and verbal instruction  The patient accepts and understands the new home activities  Completed FOTO questionnaire intake 36%, predicted 46%    Assessment: Pain in right low back/gluteal region>left  Visual, verbal, and tactile cueing required due to language barrier  Patient was able to perform exercises with good technique with cueing         Plan: Continue per plan of care   Progress treatment as tolerated

## 2018-12-10 ENCOUNTER — APPOINTMENT (OUTPATIENT)
Dept: PHYSICAL THERAPY | Facility: CLINIC | Age: 43
End: 2018-12-10
Payer: COMMERCIAL

## 2018-12-10 NOTE — PROGRESS NOTES
Daily Note     Today's date: 12/10/2018  Patient name: Amarilys Phipps  : 1975  MRN: [id number]  Referring provider: Shelbi Rose MD  Dx:   Encounter Diagnosis     ICD-10-CM    1  Chronic bilateral low back pain with bilateral sciatica M54 42     M54 41     G89 29                   Subjective: ***      Objective: See treatment diary below  Precautions: Montenegrin SPEAKING  RE-EVAL: 18    Specialty Daily Treatment Diary     Manual  12/3/18 12/5      STM lumbar paraspinals        Calf stretch  performed      Hamstring stretch B/L  performed      Piriformis Stretch B/L  performed      Hip Flexor Stretch B/L            Exercise Diary  12/3/18 12/5      Treadmill Ambulation  *      UBE Stand Alt Nustep with towel roll  Nustep 10' L1      Core brace  10x5" with p ball      Brace with knee fall outs  10x bilat      Brace with Heelslides        Bridges x10:02 10x2"      Self 90-90 hamstring stretch B/L  15"x4 bilateral      Prone press ups        Standing back extensions  10x      Quad DLS UE  LE  UE+LE  UE, LE 10x ea      LTR cross legs        Clam shells (back stable) x10 :05 B/L 2x10 bilat      Quadruped knee lifts  10x      Gluteal sets x10  :05 10x5"              Anti-rotation with TB        MTP/LTP with TB                                    Modalities        Ultrasound Low Back 1MhZ  2 0 w/cm2                            Assessment: Tolerated treatment {Tolerated treatment :}   Patient {assessment:}      Plan: {PLAN:}

## 2018-12-12 ENCOUNTER — OFFICE VISIT (OUTPATIENT)
Dept: PHYSICAL THERAPY | Facility: CLINIC | Age: 43
End: 2018-12-12
Payer: COMMERCIAL

## 2018-12-12 DIAGNOSIS — M54.42 CHRONIC BILATERAL LOW BACK PAIN WITH BILATERAL SCIATICA: Primary | ICD-10-CM

## 2018-12-12 DIAGNOSIS — G89.29 CHRONIC BILATERAL LOW BACK PAIN WITH BILATERAL SCIATICA: Primary | ICD-10-CM

## 2018-12-12 DIAGNOSIS — J45.40 MODERATE PERSISTENT ASTHMA WITHOUT COMPLICATION: ICD-10-CM

## 2018-12-12 DIAGNOSIS — M54.41 CHRONIC BILATERAL LOW BACK PAIN WITH BILATERAL SCIATICA: Primary | ICD-10-CM

## 2018-12-12 PROCEDURE — 97140 MANUAL THERAPY 1/> REGIONS: CPT

## 2018-12-12 PROCEDURE — 97110 THERAPEUTIC EXERCISES: CPT

## 2018-12-12 NOTE — PROGRESS NOTES
Daily Note     Today's date: 2018  Patient name: Kathy Shore  : 1975  MRN: [id number]  Referring provider: Pasquale Joseph MD  Dx:   Encounter Diagnosis     ICD-10-CM    1  Chronic bilateral low back pain with bilateral sciatica M54 42     M54 41     G89 29                   Subjective: Patient states her back is a 4/10 pain in the low back today  She indicated she has been doing her exercises at home  Objective: See treatment diary below  Precautions: [place] SPEAKING  RE-EVAL: 18    Specialty Daily Treatment Diary     Manual  12/3/18 12/5 12/12/18     STM lumbar paraspinals        Calf stretch  performed :30x3 B/L     Hamstring stretch B/L  performed :30x3 B/L     Piriformis Stretch B/L  performed :30x3 B/L     Hip Flexor Stretch B/L            Exercise Diary  12/3/18 12/5 12/12/18     Treadmill Ambulation  * *1 3 mph  x5 min     UBE Stand Alt Nustep with towel roll s=11  Nustep 10' L1 Nustep L1x   10 min     Core brace  10x5" with p ball 10 x:5 with green ball push     Brace with knee fall outs  10x bilat x10 B/L     Brace with Heelslides        Bridges x10:02 10x2" x10     Self 90-90 hamstring stretch B/L  15"x4 bilateral :20x 4      Prone press ups        Standing back extensions  10x x10     Quad DLS UE  LE  UE+LE  UE, LE 10x ea UEx10  LEx10     LTR cross legs   *no crossover x10     Clam shells (back stable) x10 :05 B/L 2x10 bilat 2x10 B/L     Quadruped knee lifts  10x x10     Gluteal sets x10  :05 10x5" :05x 10             Anti-rotation with TB        MTP/LTP with TB   *x10 ea  orange                                 Modalities        Ultrasound Low Back 1MhZ  2 0 w/cm2                          Patient's home exercise program updated with additional exercises  Handout given with orange theraband and all was explained  Assessment: Tolerated treatment fair+   Patient demonstrated fatigue post treatment and would benefit from continued PT for stretching, strengthening, and pain relief  Patient became dizzy after the treadmill was stopped  A 2 minute sitting recovery was needed  She felt like her whole body was spinning when she was dizzy  No other difficulties were seen with the rest of the exercise program  The dizziness never returned during the rest of the session  Plan: Continue per plan of care  Progress treatment as tolerated

## 2018-12-13 ENCOUNTER — APPOINTMENT (OUTPATIENT)
Dept: PHYSICAL THERAPY | Facility: CLINIC | Age: 43
End: 2018-12-13
Payer: COMMERCIAL

## 2018-12-13 RX ORDER — ALBUTEROL SULFATE 90 UG/1
AEROSOL, METERED RESPIRATORY (INHALATION)
Qty: 18 INHALER | Refills: 0 | Status: SHIPPED | OUTPATIENT
Start: 2018-12-13 | End: 2019-04-30 | Stop reason: SDUPTHER

## 2018-12-14 DIAGNOSIS — F41.9 ANXIETY: ICD-10-CM

## 2018-12-14 DIAGNOSIS — F33.42 RECURRENT MAJOR DEPRESSIVE DISORDER, IN FULL REMISSION (HCC): ICD-10-CM

## 2018-12-15 RX ORDER — ZOLPIDEM TARTRATE 10 MG/1
10 TABLET ORAL
Qty: 30 TABLET | Refills: 0 | Status: SHIPPED | OUTPATIENT
Start: 2018-12-15 | End: 2019-02-08 | Stop reason: SDUPTHER

## 2018-12-15 RX ORDER — CLONAZEPAM 1 MG/1
TABLET ORAL
Qty: 45 TABLET | Refills: 0 | Status: SHIPPED | OUTPATIENT
Start: 2018-12-15 | End: 2019-02-18 | Stop reason: SDUPTHER

## 2018-12-17 ENCOUNTER — APPOINTMENT (OUTPATIENT)
Dept: PHYSICAL THERAPY | Facility: CLINIC | Age: 43
End: 2018-12-17
Payer: COMMERCIAL

## 2018-12-18 ENCOUNTER — APPOINTMENT (OUTPATIENT)
Dept: PHYSICAL THERAPY | Facility: CLINIC | Age: 43
End: 2018-12-18
Payer: COMMERCIAL

## 2019-01-10 DIAGNOSIS — F33.42 RECURRENT MAJOR DEPRESSIVE DISORDER, IN FULL REMISSION (HCC): ICD-10-CM

## 2019-01-11 RX ORDER — BUPROPION HYDROCHLORIDE 150 MG/1
TABLET ORAL
Qty: 30 TABLET | Refills: 1 | Status: SHIPPED | OUTPATIENT
Start: 2019-01-11 | End: 2019-07-15

## 2019-02-05 DIAGNOSIS — M79.18 MYOFASCIAL PAIN SYNDROME: ICD-10-CM

## 2019-02-05 RX ORDER — BACLOFEN 10 MG/1
10 TABLET ORAL 3 TIMES DAILY
Qty: 90 TABLET | Refills: 1 | Status: SHIPPED | OUTPATIENT
Start: 2019-02-05 | End: 2019-07-15

## 2019-02-08 ENCOUNTER — TELEPHONE (OUTPATIENT)
Dept: FAMILY MEDICINE CLINIC | Facility: CLINIC | Age: 44
End: 2019-02-08

## 2019-02-08 DIAGNOSIS — F33.42 RECURRENT MAJOR DEPRESSIVE DISORDER, IN FULL REMISSION (HCC): ICD-10-CM

## 2019-02-08 DIAGNOSIS — F41.9 ANXIETY: ICD-10-CM

## 2019-02-11 ENCOUNTER — OFFICE VISIT (OUTPATIENT)
Dept: PAIN MEDICINE | Facility: CLINIC | Age: 44
End: 2019-02-11
Payer: COMMERCIAL

## 2019-02-11 VITALS — BODY MASS INDEX: 41.13 KG/M2 | SYSTOLIC BLOOD PRESSURE: 118 MMHG | DIASTOLIC BLOOD PRESSURE: 76 MMHG | WEIGHT: 239.6 LBS

## 2019-02-11 DIAGNOSIS — M54.42 CHRONIC BILATERAL LOW BACK PAIN WITH BILATERAL SCIATICA: ICD-10-CM

## 2019-02-11 DIAGNOSIS — G89.29 CHRONIC BILATERAL LOW BACK PAIN WITH BILATERAL SCIATICA: ICD-10-CM

## 2019-02-11 DIAGNOSIS — M51.36 LUMBAR DEGENERATIVE DISC DISEASE: Primary | ICD-10-CM

## 2019-02-11 DIAGNOSIS — M54.9 MID BACK PAIN: ICD-10-CM

## 2019-02-11 DIAGNOSIS — M54.41 CHRONIC BILATERAL LOW BACK PAIN WITH BILATERAL SCIATICA: ICD-10-CM

## 2019-02-11 DIAGNOSIS — M79.18 MYOFASCIAL PAIN SYNDROME: ICD-10-CM

## 2019-02-11 PROCEDURE — 99214 OFFICE O/P EST MOD 30 MIN: CPT | Performed by: ANESTHESIOLOGY

## 2019-02-11 RX ORDER — GABAPENTIN 300 MG/1
300 CAPSULE ORAL 3 TIMES DAILY
Qty: 90 CAPSULE | Refills: 0 | Status: SHIPPED | OUTPATIENT
Start: 2019-02-11 | End: 2019-03-15 | Stop reason: SDUPTHER

## 2019-02-11 RX ORDER — METHOCARBAMOL 500 MG/1
500 TABLET, FILM COATED ORAL 3 TIMES DAILY
Qty: 90 TABLET | Refills: 1 | Status: SHIPPED | OUTPATIENT
Start: 2019-02-11 | End: 2019-03-18 | Stop reason: SDUPTHER

## 2019-02-11 RX ORDER — DICLOFENAC SODIUM 75 MG/1
75 TABLET, DELAYED RELEASE ORAL 2 TIMES DAILY
Qty: 60 TABLET | Refills: 0 | Status: SHIPPED | OUTPATIENT
Start: 2019-02-11 | End: 2019-03-18 | Stop reason: SDUPTHER

## 2019-02-11 RX ORDER — ZOLPIDEM TARTRATE 10 MG/1
10 TABLET ORAL
Qty: 30 TABLET | Refills: 0 | Status: SHIPPED | OUTPATIENT
Start: 2019-02-11 | End: 2020-10-28

## 2019-02-11 NOTE — PROGRESS NOTES
Assessment:  1  Lumbar degenerative disc disease    2  Chronic bilateral low back pain with bilateral sciatica    3  Mid back pain    4  Myofascial pain syndrome        Plan:  Patient is a 25-year-old female with complaints of low back pain and bilateral buttock pain is, bilateral thigh pain with occasional radiating into her feet presents to office for follow-up visit  1  We will trial gabapentin 300 mg p o  T i d  radiculopathy  2  We will titrate as diclofenac 75 mg p o  For arthritic low back pain  3  We will trial robaxin 500mg po TID for myofascial pain and discontinue baclofen since is not alleviated myofascial low pain  4  We will continue HEP  5  Follow-up in 1 month and titrate gabapentin       History of Present Illness: The patient is a 53 y o  female who presents for a follow up office visit in regards to Back Pain  The patients current symptoms include 5/10 the constant burning, sharp, numbness, pins and needles in low back without any particular pain pattern    Current pain medications includes:  Diclofenac 50 mg, baclofen 10 mg  The patient reports that this regimen is providing 10% pain relief  The patient is reporting no side effects from this pain medication regimen  I have personally reviewed and/or updated the patient's past medical history, past surgical history, family history, social history, current medications, allergies, and vital signs today  Review of Systems  Review of Systems   Gastrointestinal: Positive for constipation  Musculoskeletal: Positive for gait problem and joint swelling  Neurological: Positive for dizziness  Memory loss   All other systems reviewed and are negative  Past Medical History:   Diagnosis Date    Anemia     Anxiety     Arthritis     hands, knee    Asthma     uses inhalers for the same   No recnt flairups    Back pain     Carpal tunnel syndrome     Bilateral    Constipation     Depression     Environmental allergies  GERD (gastroesophageal reflux disease)     H/O cardiac murmur     Insomnia     Kidney stone     Migraine     Psychiatric disorder     anxiety    Pyelonephritis     Varicose veins of both lower extremities with pain     Wears glasses        Past Surgical History:   Procedure Laterality Date    BACK SURGERY      States she isn't sure what she had done-possibly something to do with a lump or muscle    CHOLECYSTECTOMY      CYSTOSCOPY      onset: 6/1/16, resolved: 6/1/16    ESOPHAGOGASTRODUODENOSCOPY      MA LIGATN LONG SAPHENOUS VEIN AT [facility] Left 12/22/2016    Procedure: LIGATION/STRIPPING VEIN, LIGATION OF ANTERIOR TRIBUTARY GREATER SAPHENOUS VEIN BRANCH;  Surgeon: Alexis Scales DO;  [facility];  Service: Vascular    MA PHLEB VEINS - EXTREM - TO 20 Left 12/22/2016    Procedure: MULTIPLE STAB PHLEBECTOMIES;  Surgeon: Alexis Scales DO;  [facility];  Service: Vascular    TUBAL LIGATION      VARICOSE VEIN SURGERY Left     Left leg    VEIN LIGATION Right 6/5/2017    Procedure: LIGATION GREATER SAPHENOUS VEIN TRIBUTARY WITH STAB PHLEBECTOMIES ;  Surgeon: Alexis Scales DO;  [facility];  Service:        Family History   Problem Relation Age of Onset    Colon cancer Mother         ascending    Asthma Mother     Coronary artery disease Mother     Depression Mother     Diabetes Mother     Hypertension Mother     Lung cancer Mother     Breast cancer Mother     Varicose Veins Mother     Asthma Father     Hypertension Father     Lung cancer Father        Social History     Occupational History    Not on file   Tobacco Use    Smoking status: Current Every Day Smoker     Packs/day: 0 50     Types: Cigarettes    Smokeless tobacco: Never Used   Substance and Sexual Activity    Alcohol use: No    Drug use: No    Sexual activity: Not on file         Current Outpatient Medications:     albuterol (PROVENTIL HFA,VENTOLIN HFA) 90 mcg/act inhaler, TAKE 2 PUFFS BY MOUTH EVERY 6 HOURS AS NEEDED FOR WHEEZE, Disp: 18 Inhaler, Rfl: 0    baclofen 10 mg tablet, TAKE 1 TABLET (10 MG TOTAL) BY MOUTH 3 (THREE) TIMES A DAY FOR 30 DAYS, Disp: 90 tablet, Rfl: 1    buPROPion (WELLBUTRIN XL) 150 mg 24 hr tablet, TAKE 1 TABLET BY MOUTH EVERY DAY, Disp: 30 tablet, Rfl: 1    clonazePAM (KlonoPIN) 1 mg tablet, TAKE 1 TABLET BY MOUTH TWICE A DAY, Disp: 45 tablet, Rfl: 0    diclofenac potassium (CATAFLAM) 50 mg tablet, Take 1 tablet (50 mg total) by mouth 3 (three) times a day for 30 days, Disp: 90 tablet, Rfl: 1    fluticasone (FLOVENT HFA) 110 MCG/ACT inhaler, Inhale 2 puffs 2 (two) times a day Rinse mouth after use , Disp: 12 g, Rfl: 2    zolpidem (AMBIEN) 10 mg tablet, Take 1 tablet (10 mg total) by mouth daily at bedtime, Disp: 30 tablet, Rfl: 0    No Known Allergies    Physical Exam:    /76   Wt 109 kg (239 lb 9 6 oz)   BMI 41 13 kg/m²     Constitutional:normal, well developed, well nourished, alert, in no distress and non-toxic and no overt pain behavior  and obese  Eyes:anicteric  HEENT:grossly intact  Neck:supple, symmetric, trachea midline and no masses   Pulmonary:even and unlabored  Cardiovascular:No edema or pitting edema present  Skin:Normal without rashes or lesions and well hydrated  Psychiatric:Mood and affect appropriate  Neurologic:Cranial Nerves II-XII grossly intact  Musculoskeletal:antalgic    Lumbar/Sacral Spine examination demonstrates  Decreased range of motion lumbar spine with pain upon: flexion, lateral rotation to the left/right, and bending to the left/right  Bilateral lumbar paraspinals tender to palpation  Muscle spasms noted in the lumbar area bilaterally  4/5 lower extremity strength in all muscle groups bilaterally  Positive seated straight leg raise for bilateral lower extremities  Sensitivity to light touch intact bilateral lower extremities  2+ reflexes in the patella and Achilles    No ankle clonus         Imaging  No orders to display       No orders of the defined types were placed in this encounter

## 2019-02-18 DIAGNOSIS — F41.9 ANXIETY: ICD-10-CM

## 2019-02-18 NOTE — TELEPHONE ENCOUNTER
Pt is requesting refill for clonazpam 1mg tab and bupropion 150mg tab  Pharmacy is [facility] pharmacy on [address]

## 2019-02-19 RX ORDER — CLONAZEPAM 1 MG/1
1 TABLET ORAL 2 TIMES DAILY
Qty: 45 TABLET | Refills: 0 | Status: SHIPPED | OUTPATIENT
Start: 2019-02-19

## 2019-02-19 NOTE — TELEPHONE ENCOUNTER
That is what she has been getting  Also she was supposed to find a new psych    She has apt tomorrow so if you dont get to this before then and you see that I saw here then just hit done

## 2019-03-08 ENCOUNTER — TELEPHONE (OUTPATIENT)
Dept: FAMILY MEDICINE CLINIC | Facility: CLINIC | Age: 44
End: 2019-03-08

## 2019-03-08 NOTE — TELEPHONE ENCOUNTER
We have a form asking if she is nursing home eligible and if she is then they provide her with these orders  I do not feel like she is nursing home eligible so

## 2019-03-08 NOTE — TELEPHONE ENCOUNTER
PT IS REQUESTING AN ORDER FOR HOME CARE SERVICES, SUCH AS HELP TO GET TO APTS, HELP WITH MEALS AND BATHING AT THE HOME  SHE IS WONDERING IF YOU CAN ORDER THESE OR NOT

## 2019-03-08 NOTE — TELEPHONE ENCOUNTER
Can you please handle this and explain to her that she is only able to get those services if eligible to live in a nursing home

## 2019-03-10 DIAGNOSIS — J45.40 MODERATE PERSISTENT ASTHMA WITHOUT COMPLICATION: ICD-10-CM

## 2019-03-12 RX ORDER — DEXAMETHASONE 4 MG/1
TABLET ORAL
Qty: 12 INHALER | Refills: 2 | Status: SHIPPED | OUTPATIENT
Start: 2019-03-12 | End: 2019-05-08 | Stop reason: SDUPTHER

## 2019-03-15 DIAGNOSIS — M51.36 LUMBAR DEGENERATIVE DISC DISEASE: ICD-10-CM

## 2019-03-15 RX ORDER — GABAPENTIN 300 MG/1
CAPSULE ORAL
Qty: 90 CAPSULE | Refills: 0 | Status: SHIPPED | OUTPATIENT
Start: 2019-03-15 | End: 2019-03-18 | Stop reason: SDUPTHER

## 2019-03-18 ENCOUNTER — OFFICE VISIT (OUTPATIENT)
Dept: PAIN MEDICINE | Facility: CLINIC | Age: 44
End: 2019-03-18
Payer: COMMERCIAL

## 2019-03-18 VITALS — DIASTOLIC BLOOD PRESSURE: 81 MMHG | BODY MASS INDEX: 41.54 KG/M2 | WEIGHT: 242 LBS | SYSTOLIC BLOOD PRESSURE: 119 MMHG

## 2019-03-18 DIAGNOSIS — M54.41 CHRONIC BILATERAL LOW BACK PAIN WITH BILATERAL SCIATICA: ICD-10-CM

## 2019-03-18 DIAGNOSIS — M54.42 CHRONIC BILATERAL LOW BACK PAIN WITH BILATERAL SCIATICA: ICD-10-CM

## 2019-03-18 DIAGNOSIS — M46.1 SACROILIITIS (HCC): Primary | ICD-10-CM

## 2019-03-18 DIAGNOSIS — G89.4 CHRONIC PAIN SYNDROME: ICD-10-CM

## 2019-03-18 DIAGNOSIS — M47.816 LUMBAR SPONDYLOSIS: ICD-10-CM

## 2019-03-18 DIAGNOSIS — M51.36 LUMBAR DEGENERATIVE DISC DISEASE: ICD-10-CM

## 2019-03-18 DIAGNOSIS — G89.29 CHRONIC BILATERAL LOW BACK PAIN WITH BILATERAL SCIATICA: ICD-10-CM

## 2019-03-18 DIAGNOSIS — M79.18 MYOFASCIAL PAIN SYNDROME: ICD-10-CM

## 2019-03-18 PROCEDURE — 99214 OFFICE O/P EST MOD 30 MIN: CPT | Performed by: ANESTHESIOLOGY

## 2019-03-18 RX ORDER — GABAPENTIN 400 MG/1
400 CAPSULE ORAL 3 TIMES DAILY
Qty: 90 CAPSULE | Refills: 0 | Status: SHIPPED | OUTPATIENT
Start: 2019-03-18 | End: 2019-04-22 | Stop reason: SDUPTHER

## 2019-03-18 RX ORDER — METHOCARBAMOL 500 MG/1
500 TABLET, FILM COATED ORAL 3 TIMES DAILY
Qty: 90 TABLET | Refills: 1 | Status: SHIPPED | OUTPATIENT
Start: 2019-03-18 | End: 2020-08-10

## 2019-03-18 RX ORDER — DICLOFENAC SODIUM 75 MG/1
75 TABLET, DELAYED RELEASE ORAL 2 TIMES DAILY
Qty: 60 TABLET | Refills: 0 | Status: SHIPPED | OUTPATIENT
Start: 2019-03-18 | End: 2019-07-15 | Stop reason: SDUPTHER

## 2019-03-18 NOTE — PATIENT INSTRUCTIONS
Inyección en la articulación sacroilíaca   LO QUE NECESITA SABER:   ¿Qué necesito saber sobre rah inyección en la articulación sacroilíaca? Rah inyección en la articulación sacroilíaca se aplica para diagnosticar o tratar el dolor del síndrome de la articulación sacroilíaca  El dolor causado por jose síndrome puede sentirse en la parte baja de la espalda, glúteos, hipolito y [place]  ¿Cómo me preparo para rah inyección en la articulación sacroilíaca? Akbar médico le pedirá que no tome ningún medicamento para el dolor el día de la inyección  Pregúntele si hay otros medicamentos que usted no deba saha  Usted tendrá que pedirle a alguien que lo lleve a casa después de akbar procedimiento  ¿Qué pasará arleth la inyección en la articulación sacroilíaca? Usted estará despierto mientras le aplican la inyección  Es probable que [employer] un medicamento calmante si usted sufre de Octavio weiss  · Usted se acostará boca abajo con rah almohada debajo de akbar abdomen  Akbar médico le aplicará rah inyección de medicamento para dormirle [place]  Es probable que use hermelinda X, ultrasonido o tomografía computarizada para encontrar el área de la articulación donde deberá aplicarle la inyección  Es probable que [employer] apliquen rah inyección de un material de contraste para ayudar que akbar articulación sacroilíaca se ashlee mejor  Luego, akbar médico le inyectará la anestesia local, medicamento antiinflamatorio o ambos, en akbar articulación sacroilíaca  · Los médicos lo vigilarán a usted muy cuidadosamente en sandra de algún problema que pueda suceder incluso 30 minutos después de akbar inyección  Akbar médico revisará para nino si akbar dolor disminuye después de la inyección  ¿Cuáles son los riesgos de rah inyección en la articulación sacroilíaca? Es posible que usted tenga un poco de debilidad por un periodo corto de tiempo después de akbar inyección  La inyección en la articulación sacroilíaca puede causar sangrado, infección y dolor   También puede causar debilidad temporal en akbar pierna y problemas para orinar  Puede que usted tenga rah reacción alérgica al medicamento que le inyectaron en akbar articulación sacroilíaca  Puede que akbar dolor regrese y usted necesite más tratamiento  ACUERDOS SOBRE AKBAR CUIDADO:   Usted tiene el derecho de ayudar a planear akbar cuidado  Aprenda todo lo que pueda sobre akbar condición y brittany darle tratamiento  Discuta andres opciones de tratamiento con andres médicos para decidir el cuidado que usted desea recibir  Usted siempre tiene el derecho de rechazar el tratamiento  Esta información es sólo para uso en educación  Akbar intención no es darle un consejo médico sobre enfermedades o tratamientos  Colsulte con akbar [place] farm[employer] antes de seguir cualquier régimen médico para saber si es seguro y efectivo para usted  © 2017 [facility] Information is for End User's use only and may not be sold, redistributed or otherwise used for commercial purposes  All illustrations and images included in CareNotes® are the copyrighted property of A D A M , Inc  or Jean Hilliard

## 2019-03-18 NOTE — PROGRESS NOTES
Assessment:  1  Sacroiliitis ([facility] [address] )    2  Lumbar spondylosis    3  Chronic bilateral low back pain with bilateral sciatica    4  Chronic pain syndrome        Plan:  Patient is a 42-year-old female with complaints of low back pain and bilateral buttock pain, bilateral thigh pain occasionally radiate into her feet presents today for follow-up visit  Patient reported good relief at nighttime for the pain in her legs and low back however in the morning when she wakes up in throughout the daytime when she is active her pain is not well controlled  MRI of lumbar spine was reviewed and showed 2 levels of diffuse disc bulges which may have a confirmation will change when she stand up straight secondary to weight or lifting any objects which could cause her radicular symptoms  On the patient's also noted to have lumbar spondylosis  Patient reports in 10s cramping and burning sensation in her low back and her bilateral posterior thigh  Clinically patient presents with bilateral sacroiliitis  1  We will schedule patient for a bilateral sacroiliac joint steroid injection  2  We can alleviate her pain will then have started physical therapy with sacroiliac joint strengthening exercise  3  Refill gabapentin and titrate up to 400 mg p o  T i d  discogenic low back pain  4  Refill diclofenac 75 mg p o  B i d  For arthritic low back and sacroiliac joint pain        Complete risks and benefits including bleeding, infection, tissue reaction, nerve injury and allergic reaction were discussed  The approach was demonstrated using models and literature was provided  Verbal and written consent was obtained  [place] Prescription Drug Monitoring Program report was reviewed and was appropriate       History of Present Illness: The patient is a 53 y o  female who presents for a follow up office visit in regards to Back Pain     The patients current symptoms include patient's pain is 7/10 constant, burning, sharp, throbbing, cramping and bilateral hips in the posterior region which is worse in the morning and at nighttime  Current pain medications includes:  Voltaren, gabapentin, Robaxin    The patient reports that this regimen is providing 60% pain relief at night  The patient is reporting no side effects from this pain medication regimen  I have personally reviewed and/or updated the patient's past medical history, past surgical history, family history, social history, current medications, allergies, and vital signs today  Review of Systems  Review of Systems   All other systems reviewed and are negative  Past Medical History:   Diagnosis Date    Anemia     Anxiety     Arthritis     hands, knee    Asthma     uses inhalers for the same   No recnt flairups    Back pain     Carpal tunnel syndrome     Bilateral    Constipation     Depression     Environmental allergies     GERD (gastroesophageal reflux disease)     H/O cardiac murmur     Insomnia     Kidney stone     Migraine     Psychiatric disorder     anxiety    Pyelonephritis     Varicose veins of both lower extremities with pain     Wears glasses        Past Surgical History:   Procedure Laterality Date    BACK SURGERY      States she isn't sure what she had done-possibly something to do with a lump or muscle    CHOLECYSTECTOMY      CYSTOSCOPY      onset: 6/1/16, resolved: 6/1/16    ESOPHAGOGASTRODUODENOSCOPY      NM LIGATN LONG SAPHENOUS VEIN AT [facility] Left 12/22/2016    Procedure: LIGATION/STRIPPING VEIN, LIGATION OF ANTERIOR TRIBUTARY GREATER SAPHENOUS VEIN BRANCH;  Surgeon: Jean Mata DO;  [facility];  Service: Vascular    NM PHLEB VEINS - [facility] - [facility]  Left 12/22/2016    Procedure: MULTIPLE STAB PHLEBECTOMIES;  Surgeon: Jean Mata DO;  [facility];  Service: Vascular    TUBAL LIGATION      VARICOSE VEIN SURGERY Left     Left leg    VEIN LIGATION Right 6/5/2017    Procedure: LIGATION GREATER SAPHENOUS VEIN TRIBUTARY WITH STAB PHLEBECTOMIES ;  Surgeon: Stephanie Dooley DO;  [facility];  Service:        Family History   Problem Relation Age of Onset    Colon cancer Mother         ascending    Asthma Mother     Coronary artery disease Mother     Depression Mother     Diabetes Mother     Hypertension Mother     Lung cancer Mother     Breast cancer Mother     Varicose Veins Mother     Asthma Father     Hypertension Father     Lung cancer Father        Social History     Occupational History    Not on file   Tobacco Use    Smoking status: Current Every Day Smoker     Packs/day: 0 50     Types: Cigarettes    Smokeless tobacco: Never Used   Substance and Sexual Activity    Alcohol use: No    Drug use: No    Sexual activity: Not on file         Current Outpatient Medications:     albuterol (PROVENTIL HFA,VENTOLIN HFA) 90 mcg/act inhaler, TAKE 2 PUFFS BY MOUTH EVERY 6 HOURS AS NEEDED FOR WHEEZE, Disp: 18 Inhaler, Rfl: 0    baclofen 10 mg tablet, TAKE 1 TABLET (10 MG TOTAL) BY MOUTH 3 (THREE) TIMES A DAY FOR 30 DAYS, Disp: 90 tablet, Rfl: 1    buPROPion (WELLBUTRIN XL) 150 mg 24 hr tablet, TAKE 1 TABLET BY MOUTH EVERY DAY, Disp: 30 tablet, Rfl: 1    clonazePAM (KlonoPIN) 1 mg tablet, Take 1 tablet (1 mg total) by mouth 2 (two) times a day, Disp: 45 tablet, Rfl: 0    diclofenac (VOLTAREN) 75 mg EC tablet, Take 1 tablet (75 mg total) by mouth 2 (two) times a day for 30 days, Disp: 60 tablet, Rfl: 0    diclofenac potassium (CATAFLAM) 50 mg tablet, Take 1 tablet (50 mg total) by mouth 3 (three) times a day for 30 days, Disp: 90 tablet, Rfl: 1    FLOVENT  MCG/ACT inhaler, INHALE 2 PUFFS 2 (TWO) TIMES A DAY RINSE MOUTH AFTER USE , Disp: 12 Inhaler, Rfl: 2    gabapentin (NEURONTIN) 300 mg capsule, TAKE 1 CAPSULE BY MOUTH THREE TIMES A DAY, Disp: 90 capsule, Rfl: 0    methocarbamol (ROBAXIN) 500 mg tablet, Take 1 tablet (500 mg total) by mouth 3 (three) times a day for 30 days, Disp: 90 tablet, Rfl: 1    zolpidem (AMBIEN) 10 mg tablet, Take 1 tablet (10 mg total) by mouth daily at bedtime, Disp: 30 tablet, Rfl: 0    No Known Allergies    Physical Exam:    /81   Wt 110 kg (242 lb)   BMI 41 54 kg/m²     Constitutional:normal, well developed, well nourished, alert, in no distress and non-toxic and no overt pain behavior  and obese  Eyes:anicteric  HEENT:grossly intact  Neck:supple, symmetric, trachea midline and no masses   Pulmonary:even and unlabored  Cardiovascular:No edema or pitting edema present  Skin:Normal without rashes or lesions and well hydrated  Psychiatric:Mood and affect appropriate  Neurologic:Cranial Nerves II-XII grossly intact  Musculoskeletal:antalgic, full range of motion bilateral hips of both internal external rotation, bilateral positive Gaenslen's/Luann's/SI joint tenderness to palpation    Imaging  No orders to display       No orders of the defined types were placed in this encounter

## 2019-04-22 DIAGNOSIS — M51.36 LUMBAR DEGENERATIVE DISC DISEASE: ICD-10-CM

## 2019-04-22 RX ORDER — GABAPENTIN 300 MG/1
CAPSULE ORAL
Qty: 90 CAPSULE | Refills: 0 | Status: SHIPPED | OUTPATIENT
Start: 2019-04-22 | End: 2019-07-15

## 2019-04-22 RX ORDER — GABAPENTIN 400 MG/1
400 CAPSULE ORAL 3 TIMES DAILY
Qty: 90 CAPSULE | Refills: 0 | Status: SHIPPED | OUTPATIENT
Start: 2019-04-22 | End: 2019-07-15 | Stop reason: SDUPTHER

## 2019-04-30 ENCOUNTER — APPOINTMENT (EMERGENCY)
Dept: RADIOLOGY | Facility: HOSPITAL | Age: 44
End: 2019-04-30
Payer: COMMERCIAL

## 2019-04-30 ENCOUNTER — HOSPITAL ENCOUNTER (EMERGENCY)
Facility: HOSPITAL | Age: 44
Discharge: HOME | End: 2019-04-30
Attending: EMERGENCY MEDICINE
Payer: COMMERCIAL

## 2019-04-30 VITALS
DIASTOLIC BLOOD PRESSURE: 73 MMHG | WEIGHT: 220 LBS | HEART RATE: 74 BPM | RESPIRATION RATE: 18 BRPM | HEIGHT: 64 IN | SYSTOLIC BLOOD PRESSURE: 129 MMHG | BODY MASS INDEX: 37.56 KG/M2 | OXYGEN SATURATION: 95 % | TEMPERATURE: 97.8 F

## 2019-04-30 DIAGNOSIS — J45.901 ASTHMA EXACERBATION: Primary | ICD-10-CM

## 2019-04-30 DIAGNOSIS — J45.40 MODERATE PERSISTENT ASTHMA WITHOUT COMPLICATION: ICD-10-CM

## 2019-04-30 PROCEDURE — 94760 N-INVAS EAR/PLS OXIMETRY 1: CPT

## 2019-04-30 PROCEDURE — 94664 DEMO&/EVAL PT USE INHALER: CPT

## 2019-04-30 PROCEDURE — 94640 AIRWAY INHALATION TREATMENT: CPT

## 2019-04-30 PROCEDURE — 71046 X-RAY EXAM CHEST 2 VIEWS: CPT

## 2019-04-30 PROCEDURE — 99285 EMERGENCY DEPT VISIT HI MDM: CPT

## 2019-04-30 RX ORDER — PREDNISONE 20 MG/1
20 TABLET ORAL 2 TIMES DAILY WITH MEALS
Qty: 8 TABLET | Refills: 0 | Status: SHIPPED | OUTPATIENT
Start: 2019-04-30 | End: 2019-07-15

## 2019-04-30 RX ORDER — ALBUTEROL SULFATE 90 UG/1
2 AEROSOL, METERED RESPIRATORY (INHALATION) 4 TIMES DAILY
Qty: 18 INHALER | Refills: 0 | Status: SHIPPED | OUTPATIENT
Start: 2019-04-30 | End: 2019-05-08 | Stop reason: SDUPTHER

## 2019-04-30 RX ORDER — ALBUTEROL SULFATE 2.5 MG/3ML
2.5 SOLUTION RESPIRATORY (INHALATION) ONCE
Status: COMPLETED | OUTPATIENT
Start: 2019-04-30 | End: 2019-04-30

## 2019-04-30 RX ORDER — PREDNISONE 20 MG/1
40 TABLET ORAL ONCE
Status: COMPLETED | OUTPATIENT
Start: 2019-04-30 | End: 2019-04-30

## 2019-04-30 RX ADMIN — ALBUTEROL SULFATE 2.5 MG: 2.5 SOLUTION RESPIRATORY (INHALATION) at 01:19

## 2019-04-30 RX ADMIN — PREDNISONE 40 MG: 20 TABLET ORAL at 01:15

## 2019-05-01 ENCOUNTER — TRANSCRIBE ORDERS (OUTPATIENT)
Dept: ADMINISTRATIVE | Facility: HOSPITAL | Age: 44
End: 2019-05-01

## 2019-05-01 DIAGNOSIS — R06.83 SNORING: Primary | ICD-10-CM

## 2019-05-08 ENCOUNTER — TELEPHONE (OUTPATIENT)
Dept: FAMILY MEDICINE CLINIC | Facility: CLINIC | Age: 44
End: 2019-05-08

## 2019-05-08 ENCOUNTER — OFFICE VISIT (OUTPATIENT)
Dept: FAMILY MEDICINE CLINIC | Facility: CLINIC | Age: 44
End: 2019-05-08

## 2019-05-08 VITALS
BODY MASS INDEX: 41.66 KG/M2 | WEIGHT: 244 LBS | DIASTOLIC BLOOD PRESSURE: 74 MMHG | OXYGEN SATURATION: 98 % | HEART RATE: 75 BPM | HEIGHT: 64 IN | TEMPERATURE: 98 F | RESPIRATION RATE: 18 BRPM | SYSTOLIC BLOOD PRESSURE: 122 MMHG

## 2019-05-08 DIAGNOSIS — J45.40 MODERATE PERSISTENT ASTHMA WITHOUT COMPLICATION: Primary | ICD-10-CM

## 2019-05-08 DIAGNOSIS — K21.9 GASTROESOPHAGEAL REFLUX DISEASE, ESOPHAGITIS PRESENCE NOT SPECIFIED: ICD-10-CM

## 2019-05-08 PROCEDURE — 99213 OFFICE O/P EST LOW 20 MIN: CPT | Performed by: PHYSICIAN ASSISTANT

## 2019-05-08 RX ORDER — CETIRIZINE HYDROCHLORIDE 10 MG/1
10 TABLET, CHEWABLE ORAL DAILY
Qty: 90 TABLET | Refills: 1 | Status: SHIPPED | OUTPATIENT
Start: 2019-05-08 | End: 2020-08-26 | Stop reason: ALTCHOICE

## 2019-05-08 RX ORDER — FLUTICASONE PROPIONATE 110 UG/1
2 AEROSOL, METERED RESPIRATORY (INHALATION) 2 TIMES DAILY
Qty: 12 INHALER | Refills: 2 | Status: SHIPPED | OUTPATIENT
Start: 2019-05-08 | End: 2019-07-15 | Stop reason: SDUPTHER

## 2019-05-08 RX ORDER — ALBUTEROL SULFATE 90 UG/1
2 AEROSOL, METERED RESPIRATORY (INHALATION) 4 TIMES DAILY
Qty: 18 INHALER | Refills: 3 | Status: SHIPPED | OUTPATIENT
Start: 2019-05-08 | End: 2019-07-15 | Stop reason: SDUPTHER

## 2019-05-08 RX ORDER — ALBUTEROL SULFATE 2.5 MG/3ML
2.5 SOLUTION RESPIRATORY (INHALATION) EVERY 6 HOURS PRN
Qty: 10 VIAL | Refills: 1 | Status: SHIPPED | OUTPATIENT
Start: 2019-05-08 | End: 2020-05-12 | Stop reason: SDUPTHER

## 2019-05-08 RX ORDER — FLUTICASONE PROPIONATE 50 MCG
1 SPRAY, SUSPENSION (ML) NASAL DAILY
Qty: 16 G | Refills: 2 | Status: SHIPPED | OUTPATIENT
Start: 2019-05-08 | End: 2019-07-16 | Stop reason: SDUPTHER

## 2019-05-08 RX ORDER — OMEPRAZOLE 20 MG/1
20 CAPSULE, DELAYED RELEASE ORAL DAILY
Qty: 90 CAPSULE | Refills: 1 | Status: SHIPPED | OUTPATIENT
Start: 2019-05-08 | End: 2019-07-15 | Stop reason: SDUPTHER

## 2019-05-31 ENCOUNTER — TELEPHONE (OUTPATIENT)
Dept: SLEEP CENTER | Facility: CLINIC | Age: 44
End: 2019-05-31

## 2019-06-03 ENCOUNTER — TELEPHONE (OUTPATIENT)
Dept: SLEEP CENTER | Facility: CLINIC | Age: 44
End: 2019-06-03

## 2019-06-03 ENCOUNTER — TRANSCRIBE ORDERS (OUTPATIENT)
Dept: SLEEP CENTER | Facility: CLINIC | Age: 44
End: 2019-06-03

## 2019-06-05 ENCOUNTER — TRANSCRIBE ORDERS (OUTPATIENT)
Dept: SLEEP CENTER | Facility: CLINIC | Age: 44
End: 2019-06-05

## 2019-06-11 ENCOUNTER — TELEPHONE (OUTPATIENT)
Dept: PAIN MEDICINE | Facility: MEDICAL CENTER | Age: 44
End: 2019-06-11

## 2019-06-12 ENCOUNTER — TELEPHONE (OUTPATIENT)
Dept: SLEEP CENTER | Facility: CLINIC | Age: 44
End: 2019-06-12

## 2019-06-14 ENCOUNTER — TELEPHONE (OUTPATIENT)
Dept: PAIN MEDICINE | Facility: CLINIC | Age: 44
End: 2019-06-14

## 2019-07-15 ENCOUNTER — OFFICE VISIT (OUTPATIENT)
Dept: FAMILY MEDICINE CLINIC | Facility: CLINIC | Age: 44
End: 2019-07-15

## 2019-07-15 VITALS
SYSTOLIC BLOOD PRESSURE: 130 MMHG | RESPIRATION RATE: 18 BRPM | DIASTOLIC BLOOD PRESSURE: 80 MMHG | TEMPERATURE: 96.5 F | HEART RATE: 85 BPM | BODY MASS INDEX: 42.17 KG/M2 | OXYGEN SATURATION: 99 % | WEIGHT: 247 LBS | HEIGHT: 64 IN

## 2019-07-15 DIAGNOSIS — E66.01 MORBID OBESITY (HCC): ICD-10-CM

## 2019-07-15 DIAGNOSIS — J45.40 MODERATE PERSISTENT ASTHMA WITHOUT COMPLICATION: ICD-10-CM

## 2019-07-15 DIAGNOSIS — M54.41 CHRONIC BILATERAL LOW BACK PAIN WITH BILATERAL SCIATICA: ICD-10-CM

## 2019-07-15 DIAGNOSIS — M79.641 PAIN IN BOTH HANDS: Primary | ICD-10-CM

## 2019-07-15 DIAGNOSIS — M79.642 PAIN IN BOTH HANDS: Primary | ICD-10-CM

## 2019-07-15 DIAGNOSIS — R35.89 POLYURIA: ICD-10-CM

## 2019-07-15 DIAGNOSIS — K21.9 GASTROESOPHAGEAL REFLUX DISEASE, ESOPHAGITIS PRESENCE NOT SPECIFIED: ICD-10-CM

## 2019-07-15 DIAGNOSIS — M51.36 LUMBAR DEGENERATIVE DISC DISEASE: ICD-10-CM

## 2019-07-15 DIAGNOSIS — G89.29 CHRONIC BILATERAL LOW BACK PAIN WITH BILATERAL SCIATICA: ICD-10-CM

## 2019-07-15 DIAGNOSIS — M79.89 LEG SWELLING: ICD-10-CM

## 2019-07-15 DIAGNOSIS — M54.42 CHRONIC BILATERAL LOW BACK PAIN WITH BILATERAL SCIATICA: ICD-10-CM

## 2019-07-15 PROCEDURE — 3008F BODY MASS INDEX DOCD: CPT | Performed by: PHYSICIAN ASSISTANT

## 2019-07-15 PROCEDURE — 99214 OFFICE O/P EST MOD 30 MIN: CPT | Performed by: PHYSICIAN ASSISTANT

## 2019-07-15 RX ORDER — ALBUTEROL SULFATE 90 UG/1
2 AEROSOL, METERED RESPIRATORY (INHALATION) 4 TIMES DAILY
Qty: 18 INHALER | Refills: 1 | Status: SHIPPED | OUTPATIENT
Start: 2019-07-15 | End: 2020-02-11 | Stop reason: SDUPTHER

## 2019-07-15 RX ORDER — PHENTERMINE HYDROCHLORIDE 37.5 MG/1
37.5 CAPSULE ORAL EVERY MORNING
Qty: 30 CAPSULE | Refills: 1 | Status: SHIPPED | OUTPATIENT
Start: 2019-07-15

## 2019-07-15 RX ORDER — FLUTICASONE PROPIONATE 110 UG/1
2 AEROSOL, METERED RESPIRATORY (INHALATION) 2 TIMES DAILY
Qty: 12 INHALER | Refills: 2 | Status: SHIPPED | OUTPATIENT
Start: 2019-07-15 | End: 2020-01-17 | Stop reason: DRUGHIGH

## 2019-07-15 RX ORDER — HYDROCHLOROTHIAZIDE 25 MG/1
25 TABLET ORAL DAILY
Qty: 30 TABLET | Refills: 5 | Status: SHIPPED | OUTPATIENT
Start: 2019-07-15 | End: 2020-01-08

## 2019-07-15 RX ORDER — OMEPRAZOLE 20 MG/1
20 CAPSULE, DELAYED RELEASE ORAL DAILY
Qty: 90 CAPSULE | Refills: 1 | Status: SHIPPED | OUTPATIENT
Start: 2019-07-15 | End: 2020-08-10

## 2019-07-15 RX ORDER — DICLOFENAC SODIUM 75 MG/1
75 TABLET, DELAYED RELEASE ORAL 2 TIMES DAILY
Qty: 60 TABLET | Refills: 1 | Status: SHIPPED | OUTPATIENT
Start: 2019-07-15 | End: 2019-09-17 | Stop reason: SDUPTHER

## 2019-07-15 RX ORDER — GABAPENTIN 400 MG/1
400 CAPSULE ORAL 3 TIMES DAILY
Qty: 90 CAPSULE | Refills: 1 | Status: SHIPPED | OUTPATIENT
Start: 2019-07-15 | End: 2019-09-17 | Stop reason: SDUPTHER

## 2019-07-15 NOTE — PROGRESS NOTES
Assessment/Plan:    Chronic bilateral low back pain with bilateral sciatica  Refilled gabapentin, diclofenac  and methocarbamol  She will schedule an appointment back with pain management  She states she not go to her injection because of fear    Morbid obesity ([address] )  BMI Counseling: Body mass index is 42 4 kg/m²  Discussed the patient's BMI with her  The BMI is above average  BMI counseling and education was provided to the patient  Pharmacotherapy was recommended as ordered  She states she has been phentermine before denies any side effects of medication  Follow-up 1 month for recheck  Pain in both hands  Referred to Orthopedics for evaluation She has had an EMG in the that was normal   Symptoms seem more consistent tendinitis and she will start taking the diclofenac to this  Leg swelling  Restart as needed hydrochlorothiazide which she has been on past          Problem List Items Addressed This Visit        Digestive    Gastroesophageal reflux disease    Relevant Medications    omeprazole (PriLOSEC) 20 mg delayed release capsule    Other Relevant Orders    CBC and differential       Respiratory    Asthma    Relevant Medications    fluticasone (FLOVENT HFA) 110 MCG/ACT inhaler    albuterol (PROVENTIL HFA,VENTOLIN HFA) 90 mcg/act inhaler       Nervous and Auditory    Chronic bilateral low back pain with bilateral sciatica     Refilled gabapentin, diclofenac  and methocarbamol  She will schedule an appointment back with pain management  She states she not go to her injection because of fear         Relevant Medications    diclofenac (VOLTAREN) 75 mg EC tablet       Other    Pain in both hands - Primary     Referred to Orthopedics for evaluation She has had an EMG in the that was normal   Symptoms seem more consistent tendinitis and she will start taking the diclofenac to this           Relevant Orders    Ambulatory referral to Orthopedic Surgery    Leg swelling     Restart as needed hydrochlorothiazide which she has been on past          Relevant Medications    hydrochlorothiazide (HYDRODIURIL) 25 mg tablet    Other Relevant Orders    Magnesium    Morbid obesity ([address] )     BMI Counseling: Body mass index is 42 4 kg/m²  Discussed the patient's BMI with her  The BMI is above average  BMI counseling and education was provided to the patient  Pharmacotherapy was recommended as ordered  She states she has been phentermine before denies any side effects of medication  Follow-up 1 month for recheck  Relevant Medications    phentermine 37 5 MG capsule      Other Visit Diagnoses     Lumbar degenerative disc disease        Relevant Medications    gabapentin (NEURONTIN) 400 mg capsule    Polyuria        Relevant Orders    Comprehensive metabolic panel    Hemoglobin A1c (w/out EAG)    Lipid panel            Subjective:      Patient ID: Jonathan Silvestre is a 53 y o  female  HPI 53 year female for a back pain  She is seeing spine and pain   She was supposed to go for a sacroiliac joint injection July 10th but no showed  They do currently gabapentin to 400 mg 3 times a day  She is also supposed to be taking diclofenac 75 b i d  She has ran out of medication or back pain at worse  She says she does not go to the injection because she was afraid of side effects  She was also recommended to get a sleep study  She was to Sleep Medicine and to to reschedule appointment  She needs to get sleep study done per psych at [facility]  She is having fatigue and snores at night  She is also complaining of hand pain which is worse in the left hand  She did have an EMG done 2016 was normal   She had hand pain years now  She right-handed  Pain is worse in the MCP joint the 1st fingers  She feels when she lifts things up she is going to drop it because her hand gets weak  And because the pain  No numbness or tingling      She was getting phenteramine from her gynecologist   She would like to start medication good help her lose weight  She has gained back all the weight that she originally lost       She also needs PPL help  PPL [id number]  PPL is under her daughter Ajit Worley  The following portions of the patient's history were reviewed and updated as appropriate:   She  has a past medical history of Anemia, Anxiety, Arthritis, Asthma, Back pain, Carpal tunnel syndrome, Constipation, Depression, Environmental allergies, GERD (gastroesophageal reflux disease), H/O cardiac murmur, Insomnia, Kidney stone, Migraine, Psychiatric disorder, Pyelonephritis, and Wears glasses  She   Patient Active Problem List    Diagnosis Date Noted    Pain in both hands 07/16/2019    Leg swelling 07/16/2019    Morbid [address] [address] ) 07/16/2019    Sacroiliitis ([facility] [address] ) 03/18/2019    Chronic pain syndrome 11/26/2018    Lumbar spondylosis 11/26/2018    Lumbar disc herniation 11/26/2018    Varicose veins of right lower extremities with pain 06/05/2017    Anxiety 10/11/2016    Asthma 10/11/2016    Chronic bilateral low back pain with bilateral sciatica 10/11/2016    Depression 10/11/2016    Gastroesophageal reflux disease 10/11/2016    Recurrent urinary tract infection 10/11/2016     She  has a past surgical history that includes Cholecystectomy; Varicose vein surgery (Left); Back surgery; pr phleb veins - extrem - to 20 (Left, 12/22/2016); pr ligatn long saphenous vein at [facility]-fem [facility] (Left, 12/22/2016); Tubal ligation; Vein ligation (Right, 6/5/2017); Cystoscopy; and Esophagogastroduodenoscopy  Her family history includes Asthma in her father and mother; Breast cancer in her mother; Colon cancer in her mother; Coronary artery disease in her mother; Depression in her mother; Diabetes in her mother; Hypertension in her father and mother; Lung cancer in her father and mother; Varicose Veins in her mother  She  reports that she has been smoking cigarettes  She has been smoking about 0 50 packs per day   She has never used smokeless tobacco  She reports that she does not drink alcohol or use drugs  Current Outpatient Medications   Medication Sig Dispense Refill    albuterol (2 5 mg/3 mL) 0 083 % nebulizer solution Take 1 vial (2 5 mg total) by nebulization every 6 (six) hours as needed for wheezing or shortness of breath 10 vial 1    albuterol (PROVENTIL HFA,VENTOLIN HFA) 90 mcg/act inhaler Inhale 2 puffs 4 (four) times a day 18 Inhaler 1    cetirizine (ZyrTEC) 10 MG chewable tablet Chew 1 tablet (10 mg total) daily 90 tablet 1    clonazePAM (KlonoPIN) 1 mg tablet Take 1 tablet (1 mg total) by mouth 2 (two) times a day 45 tablet 0    fluticasone (FLONASE) 50 mcg/act nasal spray 1 spray into each nostril daily 16 g 2    fluticasone (FLOVENT HFA) 110 MCG/ACT inhaler Inhale 2 puffs 2 (two) times a day Rinse mouth after use  12 Inhaler 2    omeprazole (PriLOSEC) 20 mg delayed release capsule Take 1 capsule (20 mg total) by mouth daily 90 capsule 1    zolpidem (AMBIEN) 10 mg tablet Take 1 tablet (10 mg total) by mouth daily at bedtime 30 tablet 0    diclofenac (VOLTAREN) 75 mg EC tablet Take 1 tablet (75 mg total) by mouth 2 (two) times a day for 30 days 60 tablet 01    gabapentin (NEURONTIN) 400 mg capsule Take 1 capsule (400 mg total) by mouth 3 (three) times a day for 30 days 90 capsule 1    hydrochlorothiazide (HYDRODIURIL) 25 mg tablet Take 1 tablet (25 mg total) by mouth daily 30 tablet 5    methocarbamol (ROBAXIN) 500 mg tablet Take 1 tablet (500 mg total) by mouth 3 (three) times a day for 30 days 90 tablet 1    phentermine 37 5 MG capsule Take 1 capsule (37 5 mg total) by mouth every morning 30 capsule 1     No current facility-administered medications for this visit        Current Outpatient Medications on File Prior to Visit   Medication Sig    albuterol (2 5 mg/3 mL) 0 083 % nebulizer solution Take 1 vial (2 5 mg total) by nebulization every 6 (six) hours as needed for wheezing or shortness of breath    cetirizine (ZyrTEC) 10 MG chewable tablet Chew 1 tablet (10 mg total) daily    clonazePAM (KlonoPIN) 1 mg tablet Take 1 tablet (1 mg total) by mouth 2 (two) times a day    fluticasone (FLONASE) 50 mcg/act nasal spray 1 spray into each nostril daily    zolpidem (AMBIEN) 10 mg tablet Take 1 tablet (10 mg total) by mouth daily at bedtime    methocarbamol (ROBAXIN) 500 mg tablet Take 1 tablet (500 mg total) by mouth 3 (three) times a day for 30 days     No current facility-administered medications on file prior to visit  She has No Known Allergies       Review of Systems   Constitutional: Positive for fatigue and unexpected weight change  Respiratory: Positive for wheezing (sometime)  Negative for shortness of breath  Cardiovascular: Positive for leg swelling (intermittent and worse in the summer)  Negative for chest pain  Gastrointestinal: Negative for abdominal pain  Genitourinary: Negative for difficulty urinating  Musculoskeletal: Positive for arthralgias and back pain  Neurological: Positive for headaches  Psychiatric/Behavioral: The patient is not nervous/anxious  Objective:      /80 (BP Location: Left arm, Patient Position: Sitting, Cuff Size: Large)   Pulse 85   Temp (!) 96 5 °F (35 8 °C) (Temporal)   Resp 18   Ht 5' 4" (1 626 m)   Wt 112 kg (247 lb)   SpO2 99%   Breastfeeding? No   BMI 42 40 kg/m²          Physical Exam   Constitutional: She is oriented to person, place, and time  She appears well-developed and well-nourished  No distress  HENT:   Head: Normocephalic and atraumatic  Right Ear: External ear normal    Left Ear: External ear normal    Eyes: Conjunctivae are normal    Neck: Normal range of motion  Neck supple  No thyromegaly present  Cardiovascular: Normal rate, regular rhythm and normal heart sounds  No murmur heard  Pulmonary/Chest: Effort normal and breath sounds normal  No respiratory distress  She has no wheezes     Musculoskeletal: She exhibits tenderness (First MCP joint)  She exhibits no edema or deformity  - tinel and phalen     Lymphadenopathy:     She has no cervical adenopathy  Neurological: She is alert and oriented to person, place, and time  Psychiatric: She has a normal mood and affect  Her behavior is normal    Nursing note and vitals reviewed

## 2019-07-15 NOTE — LETTER
To Whom It May Concern: It is recommended that Thomas Foster have 12 hours of home care weekly  She needs assistance with cleaning and doctors visit  She has difficulty bending and lifting due to chronic bilateral lower back pain   Thanks    Shani Nuñez PA-C

## 2019-07-16 DIAGNOSIS — J45.40 MODERATE PERSISTENT ASTHMA WITHOUT COMPLICATION: ICD-10-CM

## 2019-07-16 PROBLEM — M79.89 LEG SWELLING: Status: ACTIVE | Noted: 2019-07-16

## 2019-07-16 PROBLEM — E66.01 MORBID OBESITY (HCC): Status: ACTIVE | Noted: 2019-07-16

## 2019-07-16 PROBLEM — M79.642 PAIN IN BOTH HANDS: Status: ACTIVE | Noted: 2019-07-16

## 2019-07-16 PROBLEM — M79.641 PAIN IN BOTH HANDS: Status: ACTIVE | Noted: 2019-07-16

## 2019-07-16 NOTE — ASSESSMENT & PLAN NOTE
Refilled gabapentin, diclofenac  and methocarbamol  She will schedule an appointment back with pain management    She states she not go to her injection because of fear

## 2019-07-16 NOTE — ASSESSMENT & PLAN NOTE
Referred to Orthopedics for evaluation She has had an EMG in the that was normal   Symptoms seem more consistent tendinitis and she will start taking the diclofenac to this

## 2019-07-16 NOTE — ASSESSMENT & PLAN NOTE
BMI Counseling: Body mass index is 42 4 kg/m²  Discussed the patient's BMI with her  The BMI is above average  BMI counseling and education was provided to the patient  Pharmacotherapy was recommended as ordered  She states she has been phentermine before denies any side effects of medication  Follow-up 1 month for recheck

## 2019-07-18 RX ORDER — FLUTICASONE PROPIONATE 50 MCG
SPRAY, SUSPENSION (ML) NASAL
Qty: 16 ML | Refills: 2 | Status: SHIPPED | OUTPATIENT
Start: 2019-07-18 | End: 2019-11-08 | Stop reason: SDUPTHER

## 2019-08-23 ENCOUNTER — TRANSCRIBE ORDERS (OUTPATIENT)
Dept: ADMINISTRATIVE | Facility: HOSPITAL | Age: 44
End: 2019-08-23

## 2019-08-23 ENCOUNTER — LAB (OUTPATIENT)
Dept: LAB | Facility: HOSPITAL | Age: 44
End: 2019-08-23
Payer: COMMERCIAL

## 2019-08-23 DIAGNOSIS — M79.89 LEG SWELLING: ICD-10-CM

## 2019-08-23 DIAGNOSIS — R35.89 POLYURIA: ICD-10-CM

## 2019-08-23 DIAGNOSIS — E55.9 VITAMIN D DEFICIENCY: ICD-10-CM

## 2019-08-23 DIAGNOSIS — Z79.899 ENCOUNTER FOR LONG-TERM (CURRENT) USE OF OTHER MEDICATIONS: ICD-10-CM

## 2019-08-23 DIAGNOSIS — Z79.899 ENCOUNTER FOR LONG-TERM (CURRENT) USE OF OTHER MEDICATIONS: Primary | ICD-10-CM

## 2019-08-23 DIAGNOSIS — K21.9 GASTROESOPHAGEAL REFLUX DISEASE, ESOPHAGITIS PRESENCE NOT SPECIFIED: ICD-10-CM

## 2019-08-23 LAB
25(OH)D3 SERPL-MCNC: 17.2 NG/ML (ref 30–100)
ALBUMIN SERPL BCP-MCNC: 3.3 G/DL (ref 3.5–5)
ALP SERPL-CCNC: 102 U/L (ref 46–116)
ALT SERPL W P-5'-P-CCNC: 131 U/L (ref 12–78)
ANION GAP SERPL CALCULATED.3IONS-SCNC: 8 MMOL/L (ref 4–13)
AST SERPL W P-5'-P-CCNC: 145 U/L (ref 5–45)
BASOPHILS # BLD AUTO: 0.03 THOUSANDS/ΜL (ref 0–0.1)
BASOPHILS NFR BLD AUTO: 0 % (ref 0–1)
BILIRUB SERPL-MCNC: 0.56 MG/DL (ref 0.2–1)
BUN SERPL-MCNC: 13 MG/DL (ref 5–25)
CALCIUM SERPL-MCNC: 9.3 MG/DL (ref 8.3–10.1)
CHLORIDE SERPL-SCNC: 105 MMOL/L (ref 100–108)
CHOLEST SERPL-MCNC: 227 MG/DL (ref 50–200)
CO2 SERPL-SCNC: 27 MMOL/L (ref 21–32)
CREAT SERPL-MCNC: 0.88 MG/DL (ref 0.6–1.3)
EOSINOPHIL # BLD AUTO: 0.17 THOUSAND/ΜL (ref 0–0.61)
EOSINOPHIL NFR BLD AUTO: 2 % (ref 0–6)
ERYTHROCYTE [DISTWIDTH] IN BLOOD BY AUTOMATED COUNT: 12.1 % (ref 11.6–15.1)
EST. AVERAGE GLUCOSE BLD GHB EST-MCNC: 126 MG/DL
GFR SERPL CREATININE-BSD FRML MDRD: 79 ML/MIN/1.73SQ M
GLUCOSE P FAST SERPL-MCNC: 111 MG/DL (ref 65–99)
HBA1C MFR BLD: 6 % (ref 4.2–6.3)
HCT VFR BLD AUTO: 45 % (ref 34.8–46.1)
HDLC SERPL-MCNC: 40 MG/DL (ref 40–60)
HGB BLD-MCNC: 13.9 G/DL (ref 11.5–15.4)
IMM GRANULOCYTES # BLD AUTO: 0.04 THOUSAND/UL (ref 0–0.2)
IMM GRANULOCYTES NFR BLD AUTO: 0 % (ref 0–2)
LDLC SERPL CALC-MCNC: 167 MG/DL (ref 0–100)
LYMPHOCYTES # BLD AUTO: 2.55 THOUSANDS/ΜL (ref 0.6–4.47)
LYMPHOCYTES NFR BLD AUTO: 28 % (ref 14–44)
MAGNESIUM SERPL-MCNC: 1.9 MG/DL (ref 1.6–2.6)
MCH RBC QN AUTO: 31.2 PG (ref 26.8–34.3)
MCHC RBC AUTO-ENTMCNC: 30.9 G/DL (ref 31.4–37.4)
MCV RBC AUTO: 101 FL (ref 82–98)
MONOCYTES # BLD AUTO: 0.61 THOUSAND/ΜL (ref 0.17–1.22)
MONOCYTES NFR BLD AUTO: 7 % (ref 4–12)
NEUTROPHILS # BLD AUTO: 5.71 THOUSANDS/ΜL (ref 1.85–7.62)
NEUTS SEG NFR BLD AUTO: 63 % (ref 43–75)
NONHDLC SERPL-MCNC: 187 MG/DL
NRBC BLD AUTO-RTO: 0 /100 WBCS
PLATELET # BLD AUTO: 213 THOUSANDS/UL (ref 149–390)
PMV BLD AUTO: 10.4 FL (ref 8.9–12.7)
POTASSIUM SERPL-SCNC: 4.1 MMOL/L (ref 3.5–5.3)
PROT SERPL-MCNC: 7.9 G/DL (ref 6.4–8.2)
RBC # BLD AUTO: 4.46 MILLION/UL (ref 3.81–5.12)
SODIUM SERPL-SCNC: 140 MMOL/L (ref 136–145)
TRIGL SERPL-MCNC: 98 MG/DL
TSH SERPL DL<=0.05 MIU/L-ACNC: 1.57 UIU/ML (ref 0.36–3.74)
WBC # BLD AUTO: 9.11 THOUSAND/UL (ref 4.31–10.16)

## 2019-08-23 PROCEDURE — 80061 LIPID PANEL: CPT

## 2019-08-23 PROCEDURE — 82306 VITAMIN D 25 HYDROXY: CPT

## 2019-08-23 PROCEDURE — 36415 COLL VENOUS BLD VENIPUNCTURE: CPT

## 2019-08-23 PROCEDURE — 83036 HEMOGLOBIN GLYCOSYLATED A1C: CPT

## 2019-08-23 PROCEDURE — 80053 COMPREHEN METABOLIC PANEL: CPT

## 2019-08-23 PROCEDURE — 84443 ASSAY THYROID STIM HORMONE: CPT

## 2019-08-23 PROCEDURE — 85025 COMPLETE CBC W/AUTO DIFF WBC: CPT

## 2019-08-23 PROCEDURE — 83735 ASSAY OF MAGNESIUM: CPT

## 2019-08-28 DIAGNOSIS — E55.9 VITAMIN D DEFICIENCY: Primary | ICD-10-CM

## 2019-08-28 DIAGNOSIS — E78.2 MIXED HYPERLIPIDEMIA: ICD-10-CM

## 2019-08-28 RX ORDER — SIMVASTATIN 40 MG
40 TABLET ORAL
Qty: 90 TABLET | Refills: 0 | Status: SHIPPED | OUTPATIENT
Start: 2019-08-28 | End: 2019-11-21 | Stop reason: SDUPTHER

## 2019-08-28 RX ORDER — ERGOCALCIFEROL 1.25 MG/1
50000 CAPSULE ORAL WEEKLY
Qty: 4 CAPSULE | Refills: 2 | Status: SHIPPED | OUTPATIENT
Start: 2019-08-28

## 2019-08-28 NOTE — RESULT ENCOUNTER NOTE
Vitamin D was low  Prescription vitamin D-ergocalciferol- will be called into pharmacy  Take it 1 time a week for 3 months and then after we will transition to daily vitamin d  Call in 3 months to start daily vitamin d  Vitamin D deficiency can lead to osteoporosis over time  It can also can cause muscle aches and fatigue  I would also recommend that we start her on a cholesterol-lowering medication  Her LDL cholesterol was 167  It should be under 100  Recommend getting regular exercise of at least moderate intensity 3 5 hours a week  I also recommend eating a low carbohydrate diet and making sure that carbs are whole grain when eaten  Also recommend trying to avoid unhealthy fats such as vegetable oil, canola oil, peanut oil  These are often cooked with but can be found in a lot of snacks such as cookies, cakes, chips

## 2019-09-11 ENCOUNTER — TELEPHONE (OUTPATIENT)
Dept: PAIN MEDICINE | Facility: MEDICAL CENTER | Age: 44
End: 2019-09-11

## 2019-09-11 NOTE — TELEPHONE ENCOUNTER
Pt's  called to schedule an appt for her  Client( [facility]) I did inform her that due to multiple missed appts, I would not be able to schedule her client with the

## 2019-09-17 DIAGNOSIS — M54.42 CHRONIC BILATERAL LOW BACK PAIN WITH BILATERAL SCIATICA: ICD-10-CM

## 2019-09-17 DIAGNOSIS — M51.36 LUMBAR DEGENERATIVE DISC DISEASE: ICD-10-CM

## 2019-09-17 DIAGNOSIS — G89.29 CHRONIC BILATERAL LOW BACK PAIN WITH BILATERAL SCIATICA: ICD-10-CM

## 2019-09-17 DIAGNOSIS — M54.41 CHRONIC BILATERAL LOW BACK PAIN WITH BILATERAL SCIATICA: ICD-10-CM

## 2019-09-18 RX ORDER — GABAPENTIN 400 MG/1
CAPSULE ORAL
Qty: 90 CAPSULE | Refills: 2 | Status: SHIPPED | OUTPATIENT
Start: 2019-09-18 | End: 2019-12-11 | Stop reason: SDUPTHER

## 2019-09-18 RX ORDER — DICLOFENAC SODIUM 75 MG/1
TABLET, DELAYED RELEASE ORAL
Qty: 60 TABLET | Refills: 2 | Status: SHIPPED | OUTPATIENT
Start: 2019-09-18 | End: 2019-12-11 | Stop reason: SDUPTHER

## 2019-11-08 DIAGNOSIS — J45.40 MODERATE PERSISTENT ASTHMA WITHOUT COMPLICATION: ICD-10-CM

## 2019-11-08 RX ORDER — FLUTICASONE PROPIONATE 50 MCG
SPRAY, SUSPENSION (ML) NASAL
Qty: 16 ML | Refills: 2 | Status: SHIPPED | OUTPATIENT
Start: 2019-11-08 | End: 2020-11-04 | Stop reason: SDUPTHER

## 2019-11-21 DIAGNOSIS — E78.2 MIXED HYPERLIPIDEMIA: ICD-10-CM

## 2019-11-22 DIAGNOSIS — E55.9 VITAMIN D DEFICIENCY: ICD-10-CM

## 2019-11-22 RX ORDER — SIMVASTATIN 40 MG
TABLET ORAL
Qty: 30 TABLET | Refills: 2 | Status: SHIPPED | OUTPATIENT
Start: 2019-11-22

## 2019-11-25 DIAGNOSIS — E55.9 VITAMIN D DEFICIENCY: Primary | ICD-10-CM

## 2019-11-25 RX ORDER — CHOLECALCIFEROL (VITAMIN D3) 50 MCG
2000 TABLET ORAL DAILY
Qty: 90 TABLET | Refills: 1 | Status: SHIPPED | OUTPATIENT
Start: 2019-11-25

## 2019-11-25 RX ORDER — ERGOCALCIFEROL 1.25 MG/1
CAPSULE ORAL
Qty: 4 CAPSULE | Refills: 2 | OUTPATIENT
Start: 2019-11-25

## 2019-12-11 DIAGNOSIS — M51.36 LUMBAR DEGENERATIVE DISC DISEASE: ICD-10-CM

## 2019-12-11 DIAGNOSIS — M54.41 CHRONIC BILATERAL LOW BACK PAIN WITH BILATERAL SCIATICA: ICD-10-CM

## 2019-12-11 DIAGNOSIS — M54.42 CHRONIC BILATERAL LOW BACK PAIN WITH BILATERAL SCIATICA: ICD-10-CM

## 2019-12-11 DIAGNOSIS — G89.29 CHRONIC BILATERAL LOW BACK PAIN WITH BILATERAL SCIATICA: ICD-10-CM

## 2019-12-12 RX ORDER — DICLOFENAC SODIUM 75 MG/1
TABLET, DELAYED RELEASE ORAL
Qty: 60 TABLET | Refills: 0 | Status: SHIPPED | OUTPATIENT
Start: 2019-12-12 | End: 2020-01-10

## 2019-12-12 RX ORDER — GABAPENTIN 400 MG/1
CAPSULE ORAL
Qty: 90 CAPSULE | Refills: 0 | Status: SHIPPED | OUTPATIENT
Start: 2019-12-12 | End: 2020-01-10

## 2020-01-08 ENCOUNTER — TELEPHONE (OUTPATIENT)
Dept: FAMILY MEDICINE CLINIC | Facility: CLINIC | Age: 45
End: 2020-01-08

## 2020-01-08 DIAGNOSIS — M79.89 LEG SWELLING: ICD-10-CM

## 2020-01-08 DIAGNOSIS — Z12.31 BREAST CANCER SCREENING BY MAMMOGRAM: Primary | ICD-10-CM

## 2020-01-08 RX ORDER — HYDROCHLOROTHIAZIDE 25 MG/1
TABLET ORAL
Qty: 90 TABLET | Refills: 0 | Status: SHIPPED | OUTPATIENT
Start: 2020-01-08 | End: 2020-04-02

## 2020-01-08 NOTE — TELEPHONE ENCOUNTER
----- Message from Emily Pedro PA-C sent at 1/8/2020 11:23 AM EST -----  Regarding: mammo  I put in a new order for her mammo  Looks like she didn't go from last year    It didn't print

## 2020-01-10 ENCOUNTER — HOSPITAL ENCOUNTER (OUTPATIENT)
Dept: ULTRASOUND IMAGING | Facility: HOSPITAL | Age: 45
Discharge: HOME | End: 2020-01-10
Payer: COMMERCIAL

## 2020-01-10 ENCOUNTER — OFFICE VISIT (OUTPATIENT)
Dept: FAMILY MEDICINE CLINIC | Facility: CLINIC | Age: 45
End: 2020-01-10

## 2020-01-10 ENCOUNTER — TELEPHONE (OUTPATIENT)
Dept: UROLOGY | Facility: MEDICAL CENTER | Age: 45
End: 2020-01-10

## 2020-01-10 ENCOUNTER — APPOINTMENT (OUTPATIENT)
Dept: LAB | Facility: CLINIC | Age: 45
End: 2020-01-10
Payer: COMMERCIAL

## 2020-01-10 ENCOUNTER — TELEPHONE (OUTPATIENT)
Dept: FAMILY MEDICINE CLINIC | Facility: CLINIC | Age: 45
End: 2020-01-10

## 2020-01-10 VITALS
HEIGHT: 64 IN | BODY MASS INDEX: 40.94 KG/M2 | OXYGEN SATURATION: 99 % | TEMPERATURE: 96.7 F | HEART RATE: 88 BPM | DIASTOLIC BLOOD PRESSURE: 72 MMHG | WEIGHT: 239.8 LBS | SYSTOLIC BLOOD PRESSURE: 104 MMHG | RESPIRATION RATE: 16 BRPM

## 2020-01-10 DIAGNOSIS — M54.41 CHRONIC BILATERAL LOW BACK PAIN WITH BILATERAL SCIATICA: ICD-10-CM

## 2020-01-10 DIAGNOSIS — G89.29 CHRONIC BILATERAL LOW BACK PAIN WITH BILATERAL SCIATICA: ICD-10-CM

## 2020-01-10 DIAGNOSIS — R31.9 HEMATURIA, UNSPECIFIED TYPE: ICD-10-CM

## 2020-01-10 DIAGNOSIS — M51.36 LUMBAR DEGENERATIVE DISC DISEASE: ICD-10-CM

## 2020-01-10 DIAGNOSIS — R30.0 DYSURIA: ICD-10-CM

## 2020-01-10 DIAGNOSIS — R31.9 HEMATURIA, UNSPECIFIED TYPE: Primary | ICD-10-CM

## 2020-01-10 DIAGNOSIS — Z23 NEED FOR VACCINATION: ICD-10-CM

## 2020-01-10 DIAGNOSIS — M54.42 CHRONIC BILATERAL LOW BACK PAIN WITH BILATERAL SCIATICA: ICD-10-CM

## 2020-01-10 LAB
AMORPH URATE CRY URNS QL MICRO: ABNORMAL /HPF
BACTERIA UR QL AUTO: ABNORMAL /HPF
BASOPHILS # BLD AUTO: 0.03 THOUSANDS/ΜL (ref 0–0.1)
BASOPHILS NFR BLD AUTO: 0 % (ref 0–1)
BILIRUB UR QL STRIP: ABNORMAL
CLARITY UR: ABNORMAL
COLOR UR: ABNORMAL
EOSINOPHIL # BLD AUTO: 0.09 THOUSAND/ΜL (ref 0–0.61)
EOSINOPHIL NFR BLD AUTO: 1 % (ref 0–6)
ERYTHROCYTE [DISTWIDTH] IN BLOOD BY AUTOMATED COUNT: 12.2 % (ref 11.6–15.1)
GLUCOSE UR STRIP-MCNC: NEGATIVE MG/DL
HCT VFR BLD AUTO: 40.8 % (ref 34.8–46.1)
HGB BLD-MCNC: 12.7 G/DL (ref 11.5–15.4)
HGB UR QL STRIP.AUTO: ABNORMAL
IMM GRANULOCYTES # BLD AUTO: 0.03 THOUSAND/UL (ref 0–0.2)
IMM GRANULOCYTES NFR BLD AUTO: 0 % (ref 0–2)
KETONES UR STRIP-MCNC: NEGATIVE MG/DL
LEUKOCYTE ESTERASE UR QL STRIP: ABNORMAL
LYMPHOCYTES # BLD AUTO: 2.27 THOUSANDS/ΜL (ref 0.6–4.47)
LYMPHOCYTES NFR BLD AUTO: 25 % (ref 14–44)
MCH RBC QN AUTO: 31.4 PG (ref 26.8–34.3)
MCHC RBC AUTO-ENTMCNC: 31.1 G/DL (ref 31.4–37.4)
MCV RBC AUTO: 101 FL (ref 82–98)
MONOCYTES # BLD AUTO: 0.7 THOUSAND/ΜL (ref 0.17–1.22)
MONOCYTES NFR BLD AUTO: 8 % (ref 4–12)
NEUTROPHILS # BLD AUTO: 6.16 THOUSANDS/ΜL (ref 1.85–7.62)
NEUTS SEG NFR BLD AUTO: 66 % (ref 43–75)
NITRITE UR QL STRIP: NEGATIVE
NON-SQ EPI CELLS URNS QL MICRO: ABNORMAL /HPF
NRBC BLD AUTO-RTO: 0 /100 WBCS
PH UR STRIP.AUTO: 5.5 [PH]
PLATELET # BLD AUTO: 204 THOUSANDS/UL (ref 149–390)
PMV BLD AUTO: 10.6 FL (ref 8.9–12.7)
PROT UR STRIP-MCNC: NEGATIVE MG/DL
RBC # BLD AUTO: 4.04 MILLION/UL (ref 3.81–5.12)
RBC #/AREA URNS AUTO: ABNORMAL /HPF
SL AMB  POCT GLUCOSE, UA: NORMAL
SL AMB LEUKOCYTE ESTERASE,UA: NORMAL
SL AMB POCT BILIRUBIN,UA: NORMAL
SL AMB POCT BLOOD,UA: NORMAL
SL AMB POCT CLARITY,UA: NORMAL
SL AMB POCT COLOR,UA: NORMAL
SL AMB POCT KETONES,UA: NORMAL
SL AMB POCT NITRITE,UA: NORMAL
SL AMB POCT PH,UA: 5
SL AMB POCT SPECIFIC GRAVITY,UA: 1.02
SL AMB POCT URINE PROTEIN: NORMAL
SL AMB POCT UROBILINOGEN: 4
SP GR UR STRIP.AUTO: 1.03 (ref 1–1.03)
UROBILINOGEN UR QL STRIP.AUTO: 1 E.U./DL
WBC # BLD AUTO: 9.28 THOUSAND/UL (ref 4.31–10.16)
WBC #/AREA URNS AUTO: ABNORMAL /HPF

## 2020-01-10 PROCEDURE — 76770 US EXAM ABDO BACK WALL COMP: CPT

## 2020-01-10 PROCEDURE — 90682 RIV4 VACC RECOMBINANT DNA IM: CPT | Performed by: FAMILY MEDICINE

## 2020-01-10 PROCEDURE — 90471 IMMUNIZATION ADMIN: CPT | Performed by: FAMILY MEDICINE

## 2020-01-10 PROCEDURE — 85025 COMPLETE CBC W/AUTO DIFF WBC: CPT

## 2020-01-10 PROCEDURE — 36415 COLL VENOUS BLD VENIPUNCTURE: CPT

## 2020-01-10 PROCEDURE — 99213 OFFICE O/P EST LOW 20 MIN: CPT | Performed by: FAMILY MEDICINE

## 2020-01-10 PROCEDURE — 87086 URINE CULTURE/COLONY COUNT: CPT | Performed by: FAMILY MEDICINE

## 2020-01-10 PROCEDURE — 81001 URINALYSIS AUTO W/SCOPE: CPT | Performed by: FAMILY MEDICINE

## 2020-01-10 PROCEDURE — 81002 URINALYSIS NONAUTO W/O SCOPE: CPT | Performed by: FAMILY MEDICINE

## 2020-01-10 RX ORDER — DICLOFENAC SODIUM 75 MG/1
TABLET, DELAYED RELEASE ORAL
Qty: 60 TABLET | Refills: 0 | Status: SHIPPED | OUTPATIENT
Start: 2020-01-10 | End: 2020-02-02

## 2020-01-10 RX ORDER — GABAPENTIN 400 MG/1
CAPSULE ORAL
Qty: 90 CAPSULE | Refills: 0 | Status: SHIPPED | OUTPATIENT
Start: 2020-01-10 | End: 2020-02-02

## 2020-01-10 NOTE — TELEPHONE ENCOUNTER
Pt given all info for STAT US kidney/bladder apt before leaving office  Apt is 1/10/20 at 1 pm at [address]  I called Urology and they will send message to nurse to override an apt for next week and call pt directly  Pt informed before leaving office about Urology calling her

## 2020-01-10 NOTE — TELEPHONE ENCOUNTER
This is a new patient and office called as a consult and said patient needs to be seen asap  Patient is getting an ultrasound today at 1 pm   Patient has hematuria  Please call patient back at [phone]

## 2020-01-10 NOTE — TELEPHONE ENCOUNTER
Called [phone] and left message for patient tentatively holding 01/16/2020 at 115 pm with Sergey Singh at the [facility] office  Asked patient to contact office to confirm

## 2020-01-10 NOTE — ASSESSMENT & PLAN NOTE
Gross hematuria significant for the past 4 months without dysuria  PCT urine dip significant for leukocytes with the presence of blood  Physical exam did not reveal any significant findings  Have sent urine for microscopy and culture  Have also recommend the patient get a stat ultrasound of the kidneys as this is been going on for 4 months  Because the blood loss I have also ordered a CBC to make sure that she has no underlying anemia  There is a concern for malignancy due to the patient's history of smoking; I have opted to refer the patient to urology for possible cystoscopy

## 2020-01-10 NOTE — TELEPHONE ENCOUNTER
Complaint/diagnosis:   New pt consult from [address] [place] [address] pt has hematuria  Insurance:  [employer]  History of Cancer:  no  Previous Urologist:  Dr Hinton Europe over 3 years  Outside testing/where:  no  what kind of test:  no  Records requested/where:   In epic  Preferred Location:   [place]

## 2020-01-10 NOTE — PROGRESS NOTES
Assessment/Plan:    Hematuria  Gross hematuria significant for the past 4 months without dysuria  PCT urine dip significant for leukocytes with the presence of blood  Physical exam did not reveal any significant findings  Have sent urine for microscopy and culture  Have also recommend the patient get a stat ultrasound of the kidneys as this is been going on for 4 months  Because the blood loss I have also ordered a CBC to make sure that she has no underlying anemia  There is a concern for malignancy due to the patient's history of smoking; I have opted to refer the patient to urology for possible cystoscopy  Diagnoses and all orders for this visit:    Hematuria, unspecified type  -     POCT urine dip  -     UA w Reflex to Microscopic w Reflex to Culture - Clinic Collect  -     CBC and differential; Future  -     US kidney and bladder; Future  -     Ambulatory referral to Urology; Future    Dysuria  -     POCT urine dip    Need for vaccination  -     influenza vaccine, [id number], quadrivalent, recombinant, PF, 0 5 mL, for patients 18 yr+ (FLUBLOK)          Subjective:      Patient ID: Yobani Nuñez is a 50 y o  female  Yobani Nuñez is a 50 y o  Female, presents to the clinic due to form a complaint of persistent blood in the urine  She states throughout this  She has no pain increased frequency but had 1 week of itching which is now resolved  She also notes that in those past 4 months she has not had her menstrual period  She states that her periods have been on and off for the past year  She is currently on medication to control her hot flashes  She has a history of back pain which she states has not changed since she noticed blood in her urine  She has a past history of tobacco dependence and she recently quit 4 months ago  She smoked for approximately 8 or 9 years and reports smoking half a pack to 1 pack at that time at that time        Blood in Urine   Irritative symptoms do not include frequency or urgency  Pertinent negatives include no abdominal pain, chills, dysuria, fever, nausea or vomiting  The following portions of the patient's history were reviewed and updated as appropriate: She  has a past medical history of Anemia, Anxiety, Arthritis, Asthma, Back pain, Carpal tunnel syndrome, Constipation, Depression, Environmental allergies, GERD (gastroesophageal reflux disease), H/O cardiac murmur, Insomnia, Kidney stone, Migraine, Psychiatric disorder, Pyelonephritis, and Wears glasses  Patient Active Problem List    Diagnosis Date Noted    Hematuria 01/10/2020    Pain in both hands 07/16/2019    Leg swelling 07/16/2019    Morbid obesity ([facility] [address] ) 07/16/2019    Sacroiliitis ([facility] [address] ) 03/18/2019    Chronic pain syndrome 11/26/2018    Lumbar spondylosis 11/26/2018    Lumbar disc herniation 11/26/2018    Varicose veins of right lower extremities with pain 06/05/2017    Anxiety 10/11/2016    Asthma 10/11/2016    Chronic bilateral low back pain with bilateral sciatica 10/11/2016    Depression 10/11/2016    Gastroesophageal reflux disease 10/11/2016    Recurrent urinary tract infection 10/11/2016     She  has a past surgical history that includes Cholecystectomy; Varicose vein surgery (Left); Back surgery; pr phleb veins - extrem - to 20 (Left, 12/22/2016); pr ligatn long saphenous vein at seph-fem jun (Left, 12/22/2016); Tubal ligation; Vein ligation (Right, 6/5/2017); Cystoscopy; and Esophagogastroduodenoscopy  Her family history includes Asthma in her father and mother; Breast cancer in her mother; Colon cancer in her mother; Coronary artery disease in her mother; Depression in her mother; Diabetes in her mother; Hypertension in her father and mother; Lung cancer in her father and mother; Varicose Veins in her mother  She  reports that she quit smoking about 5 months ago  Her smoking use included cigarettes  She smoked 0 50 packs per day   She has never used smokeless tobacco  She reports that she drinks alcohol  She reports that she does not use drugs  Current Outpatient Medications   Medication Sig Dispense Refill    albuterol (2 5 mg/3 mL) 0 083 % nebulizer solution Take 1 vial (2 5 mg total) by nebulization every 6 (six) hours as needed for wheezing or shortness of breath 10 vial 1    albuterol (PROVENTIL HFA,VENTOLIN HFA) 90 mcg/act inhaler Inhale 2 puffs 4 (four) times a day 18 Inhaler 1    ergocalciferol (VITAMIN D2) 50,000 units Take 1 capsule (50,000 Units total) by mouth once a week 4 capsule 2    fluticasone (FLONASE) 50 mcg/act nasal spray SPRAY 1 SPRAY INTO EACH NOSTRIL EVERY DAY 16 mL 2    fluticasone (FLOVENT HFA) 110 MCG/ACT inhaler Inhale 2 puffs 2 (two) times a day Rinse mouth after use   12 Inhaler 2    gabapentin (NEURONTIN) 400 mg capsule TAKE 1 CAPSULE BY MOUTH THREE TIMES A DAY 90 capsule 0    hydrochlorothiazide (HYDRODIURIL) 25 mg tablet TAKE 1 TABLET BY MOUTH EVERY DAY 90 tablet 0    methocarbamol (ROBAXIN) 500 mg tablet Take 1 tablet (500 mg total) by mouth 3 (three) times a day for 30 days 90 tablet 1    omeprazole (PriLOSEC) 20 mg delayed release capsule Take 1 capsule (20 mg total) by mouth daily 90 capsule 1    cetirizine (ZyrTEC) 10 MG chewable tablet Chew 1 tablet (10 mg total) daily (Patient not taking: Reported on 1/10/2020) 90 tablet 1    Cholecalciferol (VITAMIN D) 50 MCG (2000 UT) tablet Take 1 tablet (2,000 Units total) by mouth daily (Patient not taking: Reported on 1/10/2020) 90 tablet 1    clonazePAM (KlonoPIN) 1 mg tablet Take 1 tablet (1 mg total) by mouth 2 (two) times a day (Patient not taking: Reported on 1/10/2020) 45 tablet 0    diclofenac (VOLTAREN) 75 mg EC tablet TAKE 1 TABLET BY MOUTH TWICE A DAY (Patient not taking: Reported on 1/10/2020) 60 tablet 0    phentermine 37 5 MG capsule Take 1 capsule (37 5 mg total) by mouth every morning (Patient not taking: Reported on 1/10/2020) 30 capsule 1    simvastatin (ZOCOR) 40 mg tablet TAKE 1 TABLET BY MOUTH DAILY AT BEDTIME (Patient not taking: Reported on 1/10/2020) 30 tablet 2    zolpidem (AMBIEN) 10 mg tablet Take 1 tablet (10 mg total) by mouth daily at bedtime (Patient not taking: Reported on 1/10/2020) 30 tablet 0     No current facility-administered medications for this visit  Current Outpatient Medications on File Prior to Visit   Medication Sig    albuterol (2 5 mg/3 mL) 0 083 % nebulizer solution Take 1 vial (2 5 mg total) by nebulization every 6 (six) hours as needed for wheezing or shortness of breath    albuterol (PROVENTIL HFA,VENTOLIN HFA) 90 mcg/act inhaler Inhale 2 puffs 4 (four) times a day    ergocalciferol (VITAMIN D2) 50,000 units Take 1 capsule (50,000 Units total) by mouth once a week    fluticasone (FLONASE) 50 mcg/act nasal spray SPRAY 1 SPRAY INTO EACH NOSTRIL EVERY DAY    fluticasone (FLOVENT HFA) 110 MCG/ACT inhaler Inhale 2 puffs 2 (two) times a day Rinse mouth after use      hydrochlorothiazide (HYDRODIURIL) 25 mg tablet TAKE 1 TABLET BY MOUTH EVERY DAY    methocarbamol (ROBAXIN) 500 mg tablet Take 1 tablet (500 mg total) by mouth 3 (three) times a day for 30 days    omeprazole (PriLOSEC) 20 mg delayed release capsule Take 1 capsule (20 mg total) by mouth daily    [DISCONTINUED] gabapentin (NEURONTIN) 400 mg capsule TAKE ONE CAPSULE BY MOUTH 3 TIMES A DAY    cetirizine (ZyrTEC) 10 MG chewable tablet Chew 1 tablet (10 mg total) daily (Patient not taking: Reported on 1/10/2020)    Cholecalciferol (VITAMIN D) 50 MCG (2000 UT) tablet Take 1 tablet (2,000 Units total) by mouth daily (Patient not taking: Reported on 1/10/2020)    clonazePAM (KlonoPIN) 1 mg tablet Take 1 tablet (1 mg total) by mouth 2 (two) times a day (Patient not taking: Reported on 1/10/2020)    phentermine 37 5 MG capsule Take 1 capsule (37 5 mg total) by mouth every morning (Patient not taking: Reported on 1/10/2020)    simvastatin (ZOCOR) 40 mg tablet TAKE 1 TABLET BY MOUTH DAILY AT BEDTIME (Patient not taking: Reported on 1/10/2020)    zolpidem (AMBIEN) 10 mg tablet Take 1 tablet (10 mg total) by mouth daily at bedtime (Patient not taking: Reported on 1/10/2020)    [DISCONTINUED] diclofenac (VOLTAREN) 75 mg EC tablet TAKE 1 TABLET BY MOUTH TWICE A DAY     No current facility-administered medications on file prior to visit  She has No Known Allergies       Review of Systems   Constitutional: Negative for chills, diaphoresis and fever  Respiratory: Negative for cough and shortness of breath  Cardiovascular: Negative for chest pain  Gastrointestinal: Negative for abdominal pain, diarrhea, nausea and vomiting  Genitourinary: Positive for hematuria and menstrual problem  Negative for difficulty urinating, dysuria, frequency, urgency, vaginal bleeding, vaginal discharge and vaginal pain  Musculoskeletal: Negative for arthralgias  Neurological: Negative for dizziness and headaches  Psychiatric/Behavioral: Negative for suicidal ideas  Objective:      /72 (BP Location: Right arm, Patient Position: Sitting, Cuff Size: Large)   Pulse 88   Temp (!) 96 7 °F (35 9 °C) (Tympanic)   Resp 16   Ht 5' 4" (1 626 m)   Wt 109 kg (239 lb 12 8 oz)   LMP 09/01/2019 (Approximate)   SpO2 99%   Breastfeeding? No   BMI 41 16 kg/m²          Physical Exam   Constitutional: She is oriented to person, place, and time  She appears well-developed and well-nourished  No distress  Eyes: Conjunctivae and EOM are normal  No scleral icterus  Neck: Normal range of motion  Neck supple  Cardiovascular: Normal rate and normal heart sounds  Exam reveals no friction rub  No murmur heard  Pulmonary/Chest: Effort normal and breath sounds normal  No respiratory distress  She has no wheezes  Abdominal: Soft  Bowel sounds are normal  She exhibits no distension and no mass  There is no tenderness  Musculoskeletal: Normal range of motion  She exhibits no edema     No CVA tenderness bilaterally   Neurological: She is alert and oriented to person, place, and time  Skin: Skin is warm  No rash noted  Psychiatric: She has a normal mood and affect

## 2020-01-12 LAB — BACTERIA UR CULT: NORMAL

## 2020-01-15 NOTE — PROGRESS NOTES
1/16/2020      Chief Complaint   Patient presents with   [facility] Souza Snehal Hematuria       Assessment and Plan    50 y o  female managed by Dr Tomás Ta    1  Recurrent gross hematuria s/p a negative workup (CT urogram and cystoscopy) 2016  - urine dip today revealed blood, will send this out for microscopic urinalysis and culture  - the patient is having recurrent gross hematuria  - she had an ultrasound obtained on the 10th which was within normal limits  - I recommend a repeat cystoscopy at this time given her last workup was 4 years ago, the patient is agreeable to proceeding    2  Stress incontinence  - I discussed with her that we can further evaluate this at the time for cystoscopy as well    Follow up for cystoscopy      History of Present Illness  Lynnette Celeste is a 50 y o  female here for follow up evaluation of gross hematuria  The patient has had gross hematuria in the past   She underwent a negative hematuria workup consisted of a CT urogram and cystoscopy in 2016  The patient presents today again with complaints of gross hematuria  She has some right lower quadrant pain but no other significant complaints  She does state that she has stress incontinence as well  She had a urine obtained which was mixed contaminants and an ultrasound which was within normal limits      Review of Systems   Constitutional: Negative for activity change, chills and fever  Gastrointestinal: Negative for abdominal distention and abdominal pain  Musculoskeletal: Negative for back pain and gait problem  Psychiatric/Behavioral: Negative for behavioral problems and confusion  Past Medical History  Past Medical History:   Diagnosis Date    Anemia     Anxiety     Arthritis     hands, knee    Asthma     uses inhalers for the same   No recnt flairups    Back pain     Carpal tunnel syndrome     Bilateral    Constipation     Depression     Environmental allergies     GERD (gastroesophageal reflux disease)     H/O cardiac murmur     Insomnia     Kidney stone     Migraine     Psychiatric disorder     anxiety    Pyelonephritis     Wears glasses        Past Social History  Past Surgical History:   Procedure Laterality Date    BACK SURGERY      States she isn't sure what she had done-possibly something to do with a lump or muscle    CHOLECYSTECTOMY      CYSTOSCOPY      onset: 16, resolved: 16    ESOPHAGOGASTRODUODENOSCOPY      AK LIGATN LONG SAPHENOUS VEIN AT SEPH-FEM JUN Left 2016    Procedure: LIGATION/STRIPPING VEIN, LIGATION OF ANTERIOR TRIBUTARY GREATER SAPHENOUS VEIN BRANCH;  Surgeon: Irene Nicholson DO;  Location: [facility];  Service: Vascular    AK PHLEB VEINS - EXTREM - TO 20 Left 2016    Procedure: MULTIPLE STAB PHLEBECTOMIES;  Surgeon: Irene Nicholson DO;  Location: [facility];  Service: Vascular    TUBAL LIGATION      VARICOSE VEIN SURGERY Left     Left leg    VEIN LIGATION Right 2017    Procedure: LIGATION GREATER SAPHENOUS VEIN TRIBUTARY WITH STAB PHLEBECTOMIES ;  Surgeon: Irene Nicholson DO;  Location: [facility];  Service:      Social History     Tobacco Use   Smoking Status Former Smoker    Packs/day: 0 50    Types: Cigarettes    Last attempt to quit: 2019    Years since quittin 4   Smokeless Tobacco Never Used       Past Family History  Family History   Problem Relation Age of Onset   [facility] Colon cancer Mother         ascending    Asthma Mother     Coronary artery disease Mother     Depression Mother     Diabetes Mother     Hypertension Mother     Lung cancer Mother     Breast cancer Mother     Varicose Veins Mother     Asthma Father     Hypertension Father     Lung cancer Father        Past Social history  Social History     Socioeconomic History    Marital status: Single     Spouse name: Not on file    Number of children: Not on file    Years of education: Not on file    Highest education level: Not on file   Occupational History    Not on file   Social Needs    Financial resource strain: Not on file    Food insecurity:     Worry: Not on file     Inability: Not on file    Transportation needs:     Medical: Not on file     Non-medical: Not on file   Tobacco Use    Smoking status: Former Smoker     Packs/day: 0 50     Types: Cigarettes     Last attempt to quit: 2019     Years since quittin 4    Smokeless tobacco: Never Used   Substance and Sexual Activity    Alcohol use: Yes     Frequency: 2-3 times a week     Drinks per session: 7 to 9     Binge frequency: Weekly    Drug use: Never    Sexual activity: Not on file   Lifestyle    Physical activity:     Days per week: Not on file     Minutes per session: Not on file    Stress: Not on file   Relationships    Social connections:     Talks on phone: Not on file     Gets together: Not on file     Attends Sabianism service: Not on file     Active member of club or organization: Not on file     Attends meetings of clubs or organizations: Not on file     Relationship status: Not on file    Intimate partner violence:     Fear of current or ex partner: Not on file     Emotionally abused: Not on file     Physically abused: Not on file     Forced sexual activity: Not on file   Other Topics Concern    Not on file   Social History Narrative    Not on file       Current Medications  Current Outpatient Medications   Medication Sig Dispense Refill    albuterol (2 5 mg/3 mL) 0 083 % nebulizer solution Take 1 vial (2 5 mg total) by nebulization every 6 (six) hours as needed for wheezing or shortness of breath 10 vial 1    albuterol (PROVENTIL HFA,VENTOLIN HFA) 90 mcg/act inhaler Inhale 2 puffs 4 (four) times a day 18 Inhaler 1    Cholecalciferol (VITAMIN D) 50 MCG (2000 UT) tablet Take 1 tablet (2,000 Units total) by mouth daily 90 tablet 1    fluticasone (FLONASE) 50 mcg/act nasal spray SPRAY 1 SPRAY INTO EACH NOSTRIL EVERY DAY 16 mL 2    fluticasone (FLOVENT HFA) 110 MCG/ACT inhaler Inhale 2 puffs 2 (two) times a day Rinse mouth after use  12 Inhaler 2    gabapentin (NEURONTIN) 400 mg capsule TAKE 1 CAPSULE BY MOUTH THREE TIMES A DAY 90 capsule 0    hydrochlorothiazide (HYDRODIURIL) 25 mg tablet TAKE 1 TABLET BY MOUTH EVERY DAY 90 tablet 0    omeprazole (PriLOSEC) 20 mg delayed release capsule Take 1 capsule (20 mg total) by mouth daily 90 capsule 1    cetirizine (ZyrTEC) 10 MG chewable tablet Chew 1 tablet (10 mg total) daily (Patient not taking: Reported on 1/10/2020) 90 tablet 1    clonazePAM (KlonoPIN) 1 mg tablet Take 1 tablet (1 mg total) by mouth 2 (two) times a day (Patient not taking: Reported on 1/10/2020) 45 tablet 0    diclofenac (VOLTAREN) 75 mg EC tablet TAKE 1 TABLET BY MOUTH TWICE A DAY (Patient not taking: Reported on 1/10/2020) 60 tablet 0    ergocalciferol (VITAMIN D2) 50,000 units Take 1 capsule (50,000 Units total) by mouth once a week (Patient not taking: Reported on 1/16/2020) 4 capsule 2    methocarbamol (ROBAXIN) 500 mg tablet Take 1 tablet (500 mg total) by mouth 3 (three) times a day for 30 days 90 tablet 1    phentermine 37 5 MG capsule Take 1 capsule (37 5 mg total) by mouth every morning (Patient not taking: Reported on 1/10/2020) 30 capsule 1    simvastatin (ZOCOR) 40 mg tablet TAKE 1 TABLET BY MOUTH DAILY AT BEDTIME (Patient not taking: Reported on 1/10/2020) 30 tablet 2    zolpidem (AMBIEN) 10 mg tablet Take 1 tablet (10 mg total) by mouth daily at bedtime (Patient not taking: Reported on 1/10/2020) 30 tablet 0     No current facility-administered medications for this visit          Allergies  No Known Allergies      The following portions of the patient's history were reviewed and updated as appropriate: allergies, current medications, past medical history, past social history, past surgical history and problem list       Vitals  Vitals:    01/16/20 1313   BP: 126/72   BP Location: Left arm   Patient Position: Sitting   Cuff Size: Adult   Pulse: 92 Weight: 109 kg (241 lb)   Height: 5' 4" (1 626 m)         Physical Exam  Constitutional   General appearance: Patient is seated and in no acute distress, well appearing and well nourished  Head and Face   Head and face: Normal     Eyes   Conjunctiva and lids: No erythema, swelling or discharge  Ears, Nose, Mouth, and Throat   Hearing: Normal     Pulmonary   Respiratory effort: No increased work of breathing or signs of respiratory distress  Cardiovascular   Examination of extremities for edema and/or varicosities: Normal     Abdomen   Abdomen: Non-tender, no masses  Musculoskeletal   Gait and station: Normal     Skin   Skin and subcutaneous tissue: Warm, dry, and intact  No visible lesions or rashes  Psychiatric   Judgment and insight: Normal  Recent and remote memory:  Normal  Mood and affect: Normal      Results  Recent Results (from the past 1 hour(s))   POCT urine dip    Collection Time: 01/16/20  1:30 PM   Result Value Ref Range    LEUKOCYTE ESTERASE,UA -     NITRITE,UA -     SL AMB POCT UROBILINOGEN 1     POCT URINE PROTEIN trace      PH,UA 5 0     BLOOD,UA +++     SPECIFIC GRAVITY,UA 1 015     KETONES,UA trace     BILIRUBIN,UA +     GLUCOSE, UA -      COLOR,UA yellow     CLARITY,UA clear    ]  No results found for: PSA  Lab Results   Component Value Date    GLUCOSE 102 12/16/2015    CALCIUM 9 3 08/23/2019     04/27/2018    K 4 1 08/23/2019    CO2 27 08/23/2019     08/23/2019    BUN 13 08/23/2019    CREATININE 0 88 08/23/2019     Lab Results   Component Value Date    WBC 9 28 01/10/2020    HGB 12 7 01/10/2020    HCT 40 8 01/10/2020     (H) 01/10/2020     01/10/2020       Orders  Orders Placed This Encounter   Procedures    Cystourethroscopy     Standing Status:   Future     Standing Expiration Date:   1/16/2021     Scheduling Instructions:       In office    Urine culture    Urinalysis with microscopic    POCT urine dip

## 2020-01-16 ENCOUNTER — OFFICE VISIT (OUTPATIENT)
Dept: UROLOGY | Facility: CLINIC | Age: 45
End: 2020-01-16
Payer: COMMERCIAL

## 2020-01-16 VITALS
SYSTOLIC BLOOD PRESSURE: 126 MMHG | DIASTOLIC BLOOD PRESSURE: 72 MMHG | HEART RATE: 92 BPM | WEIGHT: 241 LBS | BODY MASS INDEX: 41.15 KG/M2 | HEIGHT: 64 IN

## 2020-01-16 DIAGNOSIS — R31.0 GROSS HEMATURIA: Primary | ICD-10-CM

## 2020-01-16 LAB
BACTERIA UR QL AUTO: ABNORMAL /HPF
BILIRUB UR QL STRIP: NEGATIVE
CLARITY UR: ABNORMAL
COLOR UR: YELLOW
GLUCOSE UR STRIP-MCNC: NEGATIVE MG/DL
HGB UR QL STRIP.AUTO: ABNORMAL
KETONES UR STRIP-MCNC: NEGATIVE MG/DL
LEUKOCYTE ESTERASE UR QL STRIP: ABNORMAL
NITRITE UR QL STRIP: NEGATIVE
NON-SQ EPI CELLS URNS QL MICRO: ABNORMAL /HPF
PH UR STRIP.AUTO: 6 [PH]
PROT UR STRIP-MCNC: NEGATIVE MG/DL
RBC #/AREA URNS AUTO: ABNORMAL /HPF
SL AMB  POCT GLUCOSE, UA: NORMAL
SL AMB LEUKOCYTE ESTERASE,UA: NORMAL
SL AMB POCT BILIRUBIN,UA: NORMAL
SL AMB POCT BLOOD,UA: NORMAL
SL AMB POCT CLARITY,UA: CLEAR
SL AMB POCT COLOR,UA: YELLOW
SL AMB POCT KETONES,UA: NORMAL
SL AMB POCT NITRITE,UA: NORMAL
SL AMB POCT PH,UA: 5
SL AMB POCT SPECIFIC GRAVITY,UA: 1.01
SL AMB POCT URINE PROTEIN: NORMAL
SL AMB POCT UROBILINOGEN: 1
SP GR UR STRIP.AUTO: 1.02 (ref 1–1.03)
UROBILINOGEN UR QL STRIP.AUTO: 1 E.U./DL
WBC #/AREA URNS AUTO: ABNORMAL /HPF

## 2020-01-16 PROCEDURE — 87086 URINE CULTURE/COLONY COUNT: CPT | Performed by: PHYSICIAN ASSISTANT

## 2020-01-16 PROCEDURE — 81001 URINALYSIS AUTO W/SCOPE: CPT | Performed by: PHYSICIAN ASSISTANT

## 2020-01-16 PROCEDURE — 81002 URINALYSIS NONAUTO W/O SCOPE: CPT | Performed by: PHYSICIAN ASSISTANT

## 2020-01-16 PROCEDURE — 99243 OFF/OP CNSLTJ NEW/EST LOW 30: CPT | Performed by: PHYSICIAN ASSISTANT

## 2020-01-17 ENCOUNTER — OFFICE VISIT (OUTPATIENT)
Dept: FAMILY MEDICINE CLINIC | Facility: CLINIC | Age: 45
End: 2020-01-17

## 2020-01-17 ENCOUNTER — APPOINTMENT (OUTPATIENT)
Dept: LAB | Facility: CLINIC | Age: 45
End: 2020-01-17
Payer: COMMERCIAL

## 2020-01-17 VITALS
OXYGEN SATURATION: 99 % | HEART RATE: 65 BPM | TEMPERATURE: 98 F | SYSTOLIC BLOOD PRESSURE: 112 MMHG | DIASTOLIC BLOOD PRESSURE: 78 MMHG | BODY MASS INDEX: 41.2 KG/M2 | RESPIRATION RATE: 16 BRPM | WEIGHT: 240 LBS

## 2020-01-17 DIAGNOSIS — R74.01 TRANSAMINITIS: ICD-10-CM

## 2020-01-17 DIAGNOSIS — R73.03 PREDIABETES: ICD-10-CM

## 2020-01-17 DIAGNOSIS — J45.40 MODERATE PERSISTENT ASTHMA WITHOUT COMPLICATION: Primary | ICD-10-CM

## 2020-01-17 LAB
ALBUMIN SERPL BCP-MCNC: 3.1 G/DL (ref 3.5–5)
ALP SERPL-CCNC: 100 U/L (ref 46–116)
ALT SERPL W P-5'-P-CCNC: 75 U/L (ref 12–78)
ANION GAP SERPL CALCULATED.3IONS-SCNC: 4 MMOL/L (ref 4–13)
AST SERPL W P-5'-P-CCNC: 67 U/L (ref 5–45)
BACTERIA UR CULT: NORMAL
BILIRUB SERPL-MCNC: 0.3 MG/DL (ref 0.2–1)
BUN SERPL-MCNC: 20 MG/DL (ref 5–25)
CALCIUM SERPL-MCNC: 9.2 MG/DL (ref 8.3–10.1)
CHLORIDE SERPL-SCNC: 106 MMOL/L (ref 100–108)
CO2 SERPL-SCNC: 31 MMOL/L (ref 21–32)
CREAT SERPL-MCNC: 0.85 MG/DL (ref 0.6–1.3)
EST. AVERAGE GLUCOSE BLD GHB EST-MCNC: 126 MG/DL
GFR SERPL CREATININE-BSD FRML MDRD: 82 ML/MIN/1.73SQ M
GLUCOSE SERPL-MCNC: 128 MG/DL (ref 65–140)
HBA1C MFR BLD: 6 % (ref 4.2–6.3)
POTASSIUM SERPL-SCNC: 3.9 MMOL/L (ref 3.5–5.3)
PROT SERPL-MCNC: 7.7 G/DL (ref 6.4–8.2)
SODIUM SERPL-SCNC: 141 MMOL/L (ref 136–145)

## 2020-01-17 PROCEDURE — 83036 HEMOGLOBIN GLYCOSYLATED A1C: CPT

## 2020-01-17 PROCEDURE — 99213 OFFICE O/P EST LOW 20 MIN: CPT | Performed by: FAMILY MEDICINE

## 2020-01-17 PROCEDURE — 80053 COMPREHEN METABOLIC PANEL: CPT

## 2020-01-17 PROCEDURE — 36415 COLL VENOUS BLD VENIPUNCTURE: CPT

## 2020-01-17 PROCEDURE — 1036F TOBACCO NON-USER: CPT | Performed by: FAMILY MEDICINE

## 2020-01-17 RX ORDER — BENZONATATE 100 MG/1
100 CAPSULE ORAL 3 TIMES DAILY PRN
Qty: 28 CAPSULE | Refills: 0 | Status: SHIPPED | OUTPATIENT
Start: 2020-01-17 | End: 2020-02-11 | Stop reason: SDUPTHER

## 2020-01-17 RX ORDER — FLUTICASONE PROPIONATE 110 UG/1
2 AEROSOL, METERED RESPIRATORY (INHALATION) 2 TIMES DAILY
COMMUNITY
End: 2020-01-17 | Stop reason: DRUGHIGH

## 2020-01-17 RX ORDER — BUPROPION HYDROCHLORIDE 100 MG/1
100 TABLET ORAL 2 TIMES DAILY
COMMUNITY
Start: 2020-01-15 | End: 2020-08-10

## 2020-01-17 RX ORDER — DIAZEPAM 2 MG/1
2 TABLET ORAL
COMMUNITY
End: 2020-10-28

## 2020-01-17 RX ORDER — TRAMADOL HYDROCHLORIDE 50 MG/1
50 TABLET ORAL EVERY 6 HOURS PRN
COMMUNITY

## 2020-01-17 NOTE — PROGRESS NOTES
Assessment/Plan:    Asthma  - Patient recently seen in [facility] Emergency Department for acute asthma exacerbation for which she was started on a course of prednisone  -   Upon further questioning it seems as though patient's asthma was no under control at baseline as she reported using her albuterol inhaler every day and that she was using the Flovent more than instructed  -  Discontinue Flovent and start treatment with Advair   -  Will prescribe Tessalon Perles for persistent cough  - Patient instructed to complete course of prednisone and will follow-up to see her PCP in approximately 6 weeks  Diagnoses and all orders for this visit:    Moderate persistent asthma without complication  -     fluticasone-salmeterol (ADVAIR, WIXELA) 100-50 mcg/dose inhaler; Inhale 1 puff 2 (two) times a day Rinse mouth after use  -     benzonatate (TESSALON PERLES) 100 mg capsule; Take 1 capsule (100 mg total) by mouth 3 (three) times a day as needed for cough    Prediabetes  -     Hemoglobin A1C; Future    Transaminitis  -     Comprehensive metabolic panel; Future    Other orders  -     TRAZODONE HCL PO; Take 100 mg by mouth daily at bedtime  -     buPROPion (WELLBUTRIN) 100 mg tablet; Take 100 mg by mouth 2 (two) times a day  -     diazepam (VALIUM) 2 mg tablet; Take 2 mg by mouth 4 (four) times daily (after meals and at bedtime)  -     traMADol (ULTRAM) 50 mg tablet; Take 50 mg by mouth every 6 (six) hours as needed for moderate pain  -     Discontinue: fluticasone (FLOVENT HFA) 110 MCG/ACT inhaler; Inhale 2 puffs 2 (two) times a day Rinse mouth after use  Subjective:      Patient ID: Tonny Muro is a 50 y o  female  HPI     Tonny Muro is a 50year old female with past medical history of asthma, obesity and back pain who presents today for an ED follow-up after being seen for an acute asthma exacerbation   Patient is primarily Polish speaking and interpretation today is provided using the [employer] [facility]  Patient was started on a course of prednisone in the Emergency department which she has been taking as prescribed  Patient however states that she has been taking her albuterol inhaler several times a day  Upon further questioning it seems as though patient's asthma was no under control at before her current asthma exacerbation as she reported using her albuterol inhaler every day and that she was using the Flovent more than instructed  Patient also states that she has a persistent cough which has not been getting any better  The following portions of the patient's history were reviewed and updated as appropriate: allergies, current medications, past family history, past medical history, past social history, past surgical history and problem list     Review of Systems   Constitutional: Negative for activity change, appetite change, chills and fever  HENT: Negative for congestion, ear pain, rhinorrhea, sinus pain and sore throat  Eyes: Negative for pain and visual disturbance  Respiratory: Positive for cough  Negative for chest tightness, shortness of breath and wheezing  Cardiovascular: Negative for chest pain, palpitations and leg swelling  Gastrointestinal: Negative for abdominal pain, constipation, diarrhea, nausea and vomiting  Endocrine: Negative for cold intolerance, heat intolerance and polyuria  Genitourinary: Negative for difficulty urinating, dysuria and pelvic pain  Musculoskeletal: Negative for arthralgias, back pain, gait problem and myalgias  Skin: Negative for color change, pallor, rash and wound  Neurological: Negative for dizziness, weakness, light-headedness, numbness and headaches  Hematological: Negative for adenopathy  Does not bruise/bleed easily  Psychiatric/Behavioral: Negative for agitation, behavioral problems, confusion, decreased concentration and dysphoric mood  The patient is not nervous/anxious  Objective:      /78 (BP Location: Right arm, Patient Position: Sitting, Cuff Size: Large)   Pulse 65   Temp 98 °F (36 7 °C) (Temporal)   Resp 16   Wt 109 kg (240 lb)   SpO2 99%   BMI 41 20 kg/m²          Physical Exam   Constitutional: She is oriented to person, place, and time  She appears well-developed and well-nourished  No distress  HENT:   Head: Normocephalic and atraumatic  Eyes: Pupils are equal, round, and reactive to light  EOM are normal  Right eye exhibits no discharge  Left eye exhibits no discharge  Neck: Normal range of motion  Neck supple  Cardiovascular: Normal rate and normal heart sounds  Pulmonary/Chest: Breath sounds normal  No respiratory distress  She has no wheezes  Coughing frequently    Abdominal: Soft  There is no tenderness  Musculoskeletal: Normal range of motion  She exhibits no edema  Neurological: She is alert and oriented to person, place, and time  Skin: Skin is warm  No rash noted  Psychiatric: She has a normal mood and affect

## 2020-01-17 NOTE — ASSESSMENT & PLAN NOTE
- Patient recently seen in [facility] Emergency Department for acute asthma exacerbation for which she was started on a course of prednisone  -   Upon further questioning it seems as though patient's asthma was no under control at baseline as she reported using her albuterol inhaler every day and that she was using the Flovent more than instructed  -  Discontinue Flovent and start treatment with Advair   -  Will prescribe Tessalon Perles for persistent cough  - Patient instructed to complete course of prednisone and will follow-up to see her PCP in approximately 6 weeks

## 2020-01-23 ENCOUNTER — TELEPHONE (OUTPATIENT)
Dept: FAMILY MEDICINE CLINIC | Facility: CLINIC | Age: 45
End: 2020-01-23

## 2020-01-23 DIAGNOSIS — J45.40 MODERATE PERSISTENT ASTHMA WITHOUT COMPLICATION: Primary | ICD-10-CM

## 2020-01-23 NOTE — TELEPHONE ENCOUNTER
Patient called and stated insurance is not covering   fluticasone-salmeterol (ADVAIR, WIXELA) 100-50 mcg/dose inhaler

## 2020-02-02 DIAGNOSIS — M54.42 CHRONIC BILATERAL LOW BACK PAIN WITH BILATERAL SCIATICA: ICD-10-CM

## 2020-02-02 DIAGNOSIS — G89.29 CHRONIC BILATERAL LOW BACK PAIN WITH BILATERAL SCIATICA: ICD-10-CM

## 2020-02-02 DIAGNOSIS — M51.36 LUMBAR DEGENERATIVE DISC DISEASE: ICD-10-CM

## 2020-02-02 DIAGNOSIS — M54.41 CHRONIC BILATERAL LOW BACK PAIN WITH BILATERAL SCIATICA: ICD-10-CM

## 2020-02-02 RX ORDER — GABAPENTIN 400 MG/1
CAPSULE ORAL
Qty: 90 CAPSULE | Refills: 1 | Status: SHIPPED | OUTPATIENT
Start: 2020-02-02 | End: 2020-04-14 | Stop reason: SDUPTHER

## 2020-02-02 RX ORDER — DICLOFENAC SODIUM 75 MG/1
TABLET, DELAYED RELEASE ORAL
Qty: 60 TABLET | Refills: 1 | Status: SHIPPED | OUTPATIENT
Start: 2020-02-02 | End: 2020-04-07 | Stop reason: SDUPTHER

## 2020-02-08 DIAGNOSIS — J45.40 MODERATE PERSISTENT ASTHMA WITHOUT COMPLICATION: ICD-10-CM

## 2020-02-09 RX ORDER — DEXAMETHASONE 4 MG/1
TABLET ORAL
Qty: 12 INHALER | Refills: 2 | OUTPATIENT
Start: 2020-02-09

## 2020-02-11 ENCOUNTER — TELEPHONE (OUTPATIENT)
Dept: FAMILY MEDICINE CLINIC | Facility: CLINIC | Age: 45
End: 2020-02-11

## 2020-02-11 DIAGNOSIS — J45.40 MODERATE PERSISTENT ASTHMA WITHOUT COMPLICATION: ICD-10-CM

## 2020-02-11 RX ORDER — BENZONATATE 100 MG/1
100 CAPSULE ORAL 3 TIMES DAILY PRN
Qty: 28 CAPSULE | Refills: 0 | Status: SHIPPED | OUTPATIENT
Start: 2020-02-11 | End: 2020-05-26

## 2020-02-11 RX ORDER — ALBUTEROL SULFATE 90 UG/1
2 AEROSOL, METERED RESPIRATORY (INHALATION) 4 TIMES DAILY
Qty: 18 INHALER | Refills: 1 | Status: SHIPPED | OUTPATIENT
Start: 2020-02-11 | End: 2020-03-31

## 2020-02-11 NOTE — TELEPHONE ENCOUNTER
Patient requesting refill on albuterol and benzonatate  Pt would like to request this be expedited due to having to leave out of [place] to see her father who is very ill

## 2020-03-09 ENCOUNTER — TRANSCRIBE ORDERS (OUTPATIENT)
Dept: ADMINISTRATIVE | Facility: HOSPITAL | Age: 45
End: 2020-03-09

## 2020-03-09 DIAGNOSIS — N64.4 MASTODYNIA: Primary | ICD-10-CM

## 2020-03-09 DIAGNOSIS — Z12.31 OTHER SCREENING MAMMOGRAM: Primary | ICD-10-CM

## 2020-03-09 DIAGNOSIS — N95.1 SYMPTOMATIC MENOPAUSAL OR FEMALE CLIMACTERIC STATES: ICD-10-CM

## 2020-03-12 ENCOUNTER — TRANSCRIBE ORDERS (OUTPATIENT)
Dept: ADMINISTRATIVE | Facility: HOSPITAL | Age: 45
End: 2020-03-12

## 2020-03-12 DIAGNOSIS — N64.4 MASTODYNIA: Primary | ICD-10-CM

## 2020-03-13 ENCOUNTER — OFFICE VISIT (OUTPATIENT)
Dept: FAMILY MEDICINE CLINIC | Facility: CLINIC | Age: 45
End: 2020-03-13

## 2020-03-13 ENCOUNTER — LAB (OUTPATIENT)
Dept: LAB | Facility: CLINIC | Age: 45
End: 2020-03-13
Payer: COMMERCIAL

## 2020-03-13 VITALS
BODY MASS INDEX: 41.15 KG/M2 | OXYGEN SATURATION: 98 % | DIASTOLIC BLOOD PRESSURE: 66 MMHG | HEART RATE: 73 BPM | RESPIRATION RATE: 18 BRPM | HEIGHT: 64 IN | WEIGHT: 241 LBS | TEMPERATURE: 96.5 F | SYSTOLIC BLOOD PRESSURE: 108 MMHG

## 2020-03-13 DIAGNOSIS — G89.29 CHRONIC BILATERAL LOW BACK PAIN WITH BILATERAL SCIATICA: ICD-10-CM

## 2020-03-13 DIAGNOSIS — M79.642 PAIN IN BOTH HANDS: ICD-10-CM

## 2020-03-13 DIAGNOSIS — M79.641 PAIN IN BOTH HANDS: ICD-10-CM

## 2020-03-13 DIAGNOSIS — N30.00 ACUTE CYSTITIS WITHOUT HEMATURIA: ICD-10-CM

## 2020-03-13 DIAGNOSIS — M25.532 LEFT WRIST PAIN: Primary | ICD-10-CM

## 2020-03-13 DIAGNOSIS — M54.42 CHRONIC BILATERAL LOW BACK PAIN WITH BILATERAL SCIATICA: ICD-10-CM

## 2020-03-13 DIAGNOSIS — M54.41 CHRONIC BILATERAL LOW BACK PAIN WITH BILATERAL SCIATICA: ICD-10-CM

## 2020-03-13 DIAGNOSIS — J45.40 MODERATE PERSISTENT ASTHMA WITHOUT COMPLICATION: ICD-10-CM

## 2020-03-13 DIAGNOSIS — E66.01 MORBID OBESITY (HCC): ICD-10-CM

## 2020-03-13 DIAGNOSIS — N39.0 RECURRENT URINARY TRACT INFECTION: ICD-10-CM

## 2020-03-13 DIAGNOSIS — R06.81 APNEA: ICD-10-CM

## 2020-03-13 LAB
CRP SERPL QL: 13.7 MG/L
SL AMB  POCT GLUCOSE, UA: NORMAL
SL AMB LEUKOCYTE ESTERASE,UA: NORMAL
SL AMB POCT BILIRUBIN,UA: NORMAL
SL AMB POCT BLOOD,UA: 250
SL AMB POCT CLARITY,UA: CLEAR
SL AMB POCT COLOR,UA: NORMAL
SL AMB POCT KETONES,UA: NORMAL
SL AMB POCT NITRITE,UA: NORMAL
SL AMB POCT PH,UA: 5
SL AMB POCT SPECIFIC GRAVITY,UA: 1.01
SL AMB POCT URINE PROTEIN: NORMAL
SL AMB POCT UROBILINOGEN: NORMAL

## 2020-03-13 PROCEDURE — 3074F SYST BP LT 130 MM HG: CPT | Performed by: PHYSICIAN ASSISTANT

## 2020-03-13 PROCEDURE — 99214 OFFICE O/P EST MOD 30 MIN: CPT | Performed by: PHYSICIAN ASSISTANT

## 2020-03-13 PROCEDURE — 36415 COLL VENOUS BLD VENIPUNCTURE: CPT

## 2020-03-13 PROCEDURE — 86618 LYME DISEASE ANTIBODY: CPT

## 2020-03-13 PROCEDURE — T1015 CLINIC SERVICE: HCPCS | Performed by: PHYSICIAN ASSISTANT

## 2020-03-13 PROCEDURE — 87086 URINE CULTURE/COLONY COUNT: CPT | Performed by: PHYSICIAN ASSISTANT

## 2020-03-13 PROCEDURE — 81002 URINALYSIS NONAUTO W/O SCOPE: CPT | Performed by: PHYSICIAN ASSISTANT

## 2020-03-13 PROCEDURE — 86038 ANTINUCLEAR ANTIBODIES: CPT

## 2020-03-13 PROCEDURE — 86430 RHEUMATOID FACTOR TEST QUAL: CPT

## 2020-03-13 PROCEDURE — 86140 C-REACTIVE PROTEIN: CPT

## 2020-03-13 PROCEDURE — 1036F TOBACCO NON-USER: CPT | Performed by: PHYSICIAN ASSISTANT

## 2020-03-13 PROCEDURE — 3078F DIAST BP <80 MM HG: CPT | Performed by: PHYSICIAN ASSISTANT

## 2020-03-13 RX ORDER — SULFAMETHOXAZOLE AND TRIMETHOPRIM 800; 160 MG/1; MG/1
1 TABLET ORAL 2 TIMES DAILY
Qty: 6 TABLET | Refills: 0 | Status: SHIPPED | OUTPATIENT
Start: 2020-03-13 | End: 2020-03-16

## 2020-03-13 NOTE — PROGRESS NOTES
bAssessment/Plan:    Chronic bilateral low back pain with bilateral sciatica  Will try to help find a different pain specialist      Recurrent urinary tract infection  To start on bactrim today as UA was positive    Pain in both hands  Referred to orhtopedics    Morbid obesity ([facility] )  BMI Counseling: Body mass index is 41 37 kg/m²  The BMI is above normal  Patient referred to weight management due to patient being morbidly obese  Patient is to continue seeing Dr Jamila Miranda for this           Problem List Items Addressed This Visit        Respiratory    Asthma    Relevant Orders    Complete PFT with post bronchodilator    Ambulatory referral to Pulmonology       Nervous and Auditory    Chronic bilateral low back pain with bilateral sciatica     Will try to help find a different pain specialist           Relevant Orders    Ambulatory referral to Pain Management    POCT urine dip (Completed)       Genitourinary    Recurrent urinary tract infection     To start on bactrim today as UA was positive            Other    Pain in both hands     Referred to orhtopedics         Relevant Orders    Ambulatory referral to Orthopedic Surgery    C-reactive protein (Completed)    RF Screen w/ Reflex to Titer    Lyme Antibody Profile with reflex to WB (Completed)    LUIS Screen w/ Reflex to Titer/Pattern    Morbid obesity ([facility] )     BMI Counseling: Body mass index is 41 37 kg/m²  The BMI is above normal  Patient referred to weight management due to patient being morbidly obese  Patient is to continue seeing Dr Jamila Miranda for this             Other Visit Diagnoses     Left wrist pain    -  Primary    Apnea        Relevant Orders    Ambulatory referral to Sleep Medicine    Acute cystitis without hematuria        Relevant Medications    sulfamethoxazole-trimethoprim (BACTRIM DS) 800-160 mg per tablet    Other Relevant Orders    POCT urine dip (Completed)            Subjective:      Patient ID: Dylon Rodriguez is a 50 y o  female      [address] [place] [address] y/o F here for follow up of asthma  She is using advair 2 puffs bid and it helps mopre but she is still having a cough  She would like to see a pulmonologist   She also does have allergies but she does not feel like she is having allergy symptoms right now  She is getting pain in both wrists  She is right handed  She is getting numnessin both hands but more the left  She was referred in the but never went  She also has tried NSAIDs but they didn't helps    She is having problems breatthing at night  She snores and wakes and feels asphyixiated  She has never been tested for sleep apnea  She does have AM headaches and is constantly tired  She is nolonger seeing her back specialist anymore  She was seeing dr Lj Polo in [place] [id number]  She had no showed too many visits  She is having pain throughout her body when doing physicasl activities  She then looks like an old lady because cannot move or bend properly  She gets pain in her back but also gets joint pains             On Monday she saw her gyn   She is having dysuria for 3 dayw  sympyoms are getting wose  She is having increased burnung at the end of urination  She is going to [employer] on Sunday  The following portions of the patient's history were reviewed and updated as appropriate:     She  has a past medical history of Anemia, Anxiety, Arthritis, Asthma, Back pain, Carpal tunnel syndrome, Constipation, Depression, Environmental allergies, GERD (gastroesophageal reflux disease), H/O cardiac murmur, Insomnia, Kidney stone, Migraine, Psychiatric disorder, Pyelonephritis, and Wears glasses    She   Patient Active Problem List    Diagnosis Date Noted    Hematuria 01/10/2020    Pain in both hands 07/16/2019    Leg swelling 07/16/2019    Morbid obesity ([address] ) 07/16/2019    Sacroiliitis ([facility] ) 03/18/2019    Chronic pain syndrome 11/26/2018    Lumbar spondylosis 11/26/2018    Lumbar disc herniation 11/26/2018    Varicose veins of right lower extremities with pain 06/05/2017    Anxiety 10/11/2016    Asthma 10/11/2016    Chronic bilateral low back pain with bilateral sciatica 10/11/2016    Depression 10/11/2016    Gastroesophageal reflux disease 10/11/2016    Recurrent urinary tract infection 10/11/2016     She  has a past surgical history that includes Cholecystectomy; Varicose vein surgery (Left); Back surgery; pr phleb veins - extrem - to 20 (Left, 12/22/2016); pr ligatn long saphenous vein [facility] (Left, 12/22/2016); Tubal ligation; Vein ligation (Right, 6/5/2017); Cystoscopy; and Esophagogastroduodenoscopy  Her family history includes Asthma in her father and mother; Breast cancer in her mother; Colon cancer in her mother; Coronary artery disease in her mother; Depression in her mother; Diabetes in her mother; Hypertension in her father and mother; Lung cancer in her father and mother; Varicose Veins in her mother  She  reports that she quit smoking about 7 months ago  Her smoking use included cigarettes  She smoked 0 50 packs per day  She has never used smokeless tobacco  She reports that she drinks alcohol  She reports that she does not use drugs  Current Outpatient Medications   Medication Sig Dispense Refill    benzonatate (TESSALON PERLES) 100 mg capsule Take 1 capsule (100 mg total) by mouth 3 (three) times a day as needed for cough 28 capsule 0    buPROPion (WELLBUTRIN SR) 200 MG 12 hr tablet       buPROPion (WELLBUTRIN) 100 mg tablet Take 100 mg by mouth 2 (two) times a day      Cholecalciferol (VITAMIN D) 50 MCG (2000 UT) tablet Take 1 tablet (2,000 Units total) by mouth daily 90 tablet 1    clonazePAM (KlonoPIN) 1 mg tablet Take 1 tablet (1 mg total) by mouth 2 (two) times a day 45 tablet 0    fluticasone (FLONASE) 50 mcg/act nasal spray SPRAY 1 SPRAY INTO EACH NOSTRIL EVERY DAY 16 mL 2    fluticasone-salmeterol (ADVAIR HFA) 45-21 MCG/ACT inhaler Inhale 2 puffs 2 (two) times a day Rinse mouth after use   1 Inhaler 1  gabapentin (NEURONTIN) 400 mg capsule TAKE 1 CAPSULE BY MOUTH THREE TIMES A DAY 90 capsule 1    hydrochlorothiazide (HYDRODIURIL) 25 mg tablet TAKE 1 TABLET BY MOUTH EVERY DAY 90 tablet 0    omeprazole (PriLOSEC) 20 mg delayed release capsule Take 1 capsule (20 mg total) by mouth daily 90 capsule 1    traMADol (ULTRAM) 50 mg tablet Take 50 mg by mouth every 6 (six) hours as needed for moderate pain      TRAZODONE HCL PO Take 100 mg by mouth daily at bedtime      zolpidem (AMBIEN) 10 mg tablet Take 1 tablet (10 mg total) by mouth daily at bedtime 30 tablet 0    albuterol (2 5 mg/3 mL) 0 083 % nebulizer solution Take 1 vial (2 5 mg total) by nebulization every 6 (six) hours as needed for wheezing or shortness of breath (Patient not taking: Reported on 3/13/2020) 10 vial 1    albuterol (PROVENTIL HFA,VENTOLIN HFA) 90 mcg/act inhaler Inhale 2 puffs 4 (four) times a day (Patient not taking: Reported on 3/13/2020) 18 Inhaler 1    cetirizine (ZyrTEC) 10 MG chewable tablet Chew 1 tablet (10 mg total) daily (Patient not taking: Reported on 1/10/2020) 90 tablet 1    diazepam (VALIUM) 2 mg tablet Take 2 mg by mouth 4 (four) times daily (after meals and at bedtime)      diclofenac (VOLTAREN) 75 mg EC tablet TAKE 1 TABLET BY MOUTH TWICE A DAY (Patient not taking: Reported on 3/13/2020) 60 tablet 1    ergocalciferol (VITAMIN D2) 50,000 units Take 1 capsule (50,000 Units total) by mouth once a week (Patient not taking: Reported on 1/16/2020) 4 capsule 2    methocarbamol (ROBAXIN) 500 mg tablet Take 1 tablet (500 mg total) by mouth 3 (three) times a day for 30 days 90 tablet 1    phentermine 37 5 MG capsule Take 1 capsule (37 5 mg total) by mouth every morning (Patient not taking: Reported on 1/10/2020) 30 capsule 1    simvastatin (ZOCOR) 40 mg tablet TAKE 1 TABLET BY MOUTH DAILY AT BEDTIME (Patient not taking: Reported on 1/10/2020) 30 tablet 2    sulfamethoxazole-trimethoprim (BACTRIM DS) 800-160 mg per tablet Take 1 tablet by mouth 2 (two) times a day for 3 days 6 tablet 0     No current facility-administered medications for this visit  Current Outpatient Medications on File Prior to Visit   Medication Sig    benzonatate (TESSALON PERLES) 100 mg capsule Take 1 capsule (100 mg total) by mouth 3 (three) times a day as needed for cough    buPROPion (WELLBUTRIN SR) 200 MG 12 hr tablet     buPROPion (WELLBUTRIN) 100 mg tablet Take 100 mg by mouth 2 (two) times a day    Cholecalciferol (VITAMIN D) 50 MCG (2000 UT) tablet Take 1 tablet (2,000 Units total) by mouth daily    clonazePAM (KlonoPIN) 1 mg tablet Take 1 tablet (1 mg total) by mouth 2 (two) times a day    fluticasone (FLONASE) 50 mcg/act nasal spray SPRAY 1 SPRAY INTO EACH NOSTRIL EVERY DAY    fluticasone-salmeterol (ADVAIR HFA) 45-21 MCG/ACT inhaler Inhale 2 puffs 2 (two) times a day Rinse mouth after use      gabapentin (NEURONTIN) 400 mg capsule TAKE 1 CAPSULE BY MOUTH THREE TIMES A DAY    hydrochlorothiazide (HYDRODIURIL) 25 mg tablet TAKE 1 TABLET BY MOUTH EVERY DAY    omeprazole (PriLOSEC) 20 mg delayed release capsule Take 1 capsule (20 mg total) by mouth daily    traMADol (ULTRAM) 50 mg tablet Take 50 mg by mouth every 6 (six) hours as needed for moderate pain    TRAZODONE HCL PO Take 100 mg by mouth daily at bedtime    zolpidem (AMBIEN) 10 mg tablet Take 1 tablet (10 mg total) by mouth daily at bedtime    albuterol (2 5 mg/3 mL) 0 083 % nebulizer solution Take 1 vial (2 5 mg total) by nebulization every 6 (six) hours as needed for wheezing or shortness of breath (Patient not taking: Reported on 3/13/2020)    albuterol (PROVENTIL HFA,VENTOLIN HFA) 90 mcg/act inhaler Inhale 2 puffs 4 (four) times a day (Patient not taking: Reported on 3/13/2020)    cetirizine (ZyrTEC) 10 MG chewable tablet Chew 1 tablet (10 mg total) daily (Patient not taking: Reported on 1/10/2020)    diazepam (VALIUM) 2 mg tablet Take 2 mg by mouth 4 (four) times daily (after meals and at bedtime)    diclofenac (VOLTAREN) 75 mg EC tablet TAKE 1 TABLET BY MOUTH TWICE A DAY (Patient not taking: Reported on 3/13/2020)    ergocalciferol (VITAMIN D2) 50,000 units Take 1 capsule (50,000 Units total) by mouth once a week (Patient not taking: Reported on 1/16/2020)    methocarbamol (ROBAXIN) 500 mg tablet Take 1 tablet (500 mg total) by mouth 3 (three) times a day for 30 days    phentermine 37 5 MG capsule Take 1 capsule (37 5 mg total) by mouth every morning (Patient not taking: Reported on 1/10/2020)    simvastatin (ZOCOR) 40 mg tablet TAKE 1 TABLET BY MOUTH DAILY AT BEDTIME (Patient not taking: Reported on 1/10/2020)     No current facility-administered medications on file prior to visit  She has No Known Allergies       Review of Systems   Constitutional: Negative for activity change, appetite change, chills, fatigue and unexpected weight change  HENT: Negative for ear pain, hearing loss and sore throat  Eyes: Negative for visual disturbance  Respiratory: Positive for shortness of breath  Negative for cough and wheezing  Cardiovascular: Negative for chest pain  Gastrointestinal: Negative for abdominal pain, constipation, diarrhea and vomiting  Genitourinary: Positive for dysuria  Negative for difficulty urinating  Musculoskeletal: Positive for arthralgias, back pain and myalgias  Skin: Negative for rash  Neurological: Positive for dizziness and headaches  Objective:      /66 (BP Location: Left arm, Patient Position: Sitting, Cuff Size: Large)   Pulse 73   Temp (!) 96 5 °F (35 8 °C) (Temporal)   Resp 18   Ht 5' 4" (1 626 m)   Wt 109 kg (241 lb)   SpO2 98%   BMI 41 37 kg/m²          Physical Exam   Constitutional: She is oriented to person, place, and time  She appears well-developed and well-nourished  No distress  HENT:   Head: Normocephalic and atraumatic     Right Ear: External ear normal    Left Ear: External ear normal    Eyes: Conjunctivae are normal    Neck: Normal range of motion  Neck supple  No thyromegaly present  Cardiovascular: Normal rate, regular rhythm and normal heart sounds  No murmur heard  Pulmonary/Chest: Effort normal and breath sounds normal  No respiratory distress  She has no wheezes  Musculoskeletal: She exhibits tenderness (L3-5)  Lymphadenopathy:     She has no cervical adenopathy  Neurological: She is alert and oriented to person, place, and time  Psychiatric: She has a normal mood and affect  Her behavior is normal    Nursing note and vitals reviewed

## 2020-03-14 ENCOUNTER — TRANSCRIBE ORDERS (OUTPATIENT)
Dept: LAB | Facility: HOSPITAL | Age: 45
End: 2020-03-14

## 2020-03-14 DIAGNOSIS — N30.00 ACUTE CYSTITIS WITHOUT HEMATURIA: ICD-10-CM

## 2020-03-14 DIAGNOSIS — M54.41 CHRONIC BILATERAL LOW BACK PAIN WITH BILATERAL SCIATICA: Primary | ICD-10-CM

## 2020-03-14 DIAGNOSIS — M54.42 CHRONIC BILATERAL LOW BACK PAIN WITH BILATERAL SCIATICA: Primary | ICD-10-CM

## 2020-03-14 DIAGNOSIS — G89.29 CHRONIC BILATERAL LOW BACK PAIN WITH BILATERAL SCIATICA: Primary | ICD-10-CM

## 2020-03-14 LAB — B BURGDOR IGG+IGM SER-ACNC: <0.91 ISR (ref 0–0.9)

## 2020-03-15 LAB — BACTERIA UR CULT: NORMAL

## 2020-03-16 ENCOUNTER — TELEPHONE (OUTPATIENT)
Dept: FAMILY MEDICINE CLINIC | Facility: CLINIC | Age: 45
End: 2020-03-16

## 2020-03-16 DIAGNOSIS — J45.40 MODERATE PERSISTENT ASTHMA WITHOUT COMPLICATION: Primary | ICD-10-CM

## 2020-03-16 LAB
RHEUMATOID FACT SER QL LA: NEGATIVE
RYE IGE QN: NEGATIVE

## 2020-03-16 NOTE — ASSESSMENT & PLAN NOTE
BMI Counseling: Body mass index is 41 37 kg/m²  The BMI is above normal  Patient referred to weight management due to patient being morbidly obese   Patient is to continue seeing Dr Shadi Johnson for this

## 2020-03-17 NOTE — RESULT ENCOUNTER NOTE
Lupus test and rheumatoid arthritis test were negative  There is a generalized test for inflammation in the body and it did   Show that she had some inflammation body    I would recommend trying the eat it an anti inflammatory diet   Such as a Mediterranean diet and trying to get regular exercise to decrease inflammation level

## 2020-03-18 ENCOUNTER — TELEPHONE (OUTPATIENT)
Dept: FAMILY MEDICINE CLINIC | Facility: CLINIC | Age: 45
End: 2020-03-18

## 2020-03-31 DIAGNOSIS — J45.40 MODERATE PERSISTENT ASTHMA WITHOUT COMPLICATION: ICD-10-CM

## 2020-03-31 RX ORDER — ALBUTEROL SULFATE 90 UG/1
AEROSOL, METERED RESPIRATORY (INHALATION)
Qty: 18 INHALER | Refills: 1 | Status: SHIPPED | OUTPATIENT
Start: 2020-03-31 | End: 2020-06-01

## 2020-04-02 DIAGNOSIS — M79.89 LEG SWELLING: ICD-10-CM

## 2020-04-02 RX ORDER — HYDROCHLOROTHIAZIDE 25 MG/1
TABLET ORAL
Qty: 90 TABLET | Refills: 1 | Status: SHIPPED | OUTPATIENT
Start: 2020-04-02 | End: 2020-08-10 | Stop reason: SDUPTHER

## 2020-04-07 DIAGNOSIS — M54.42 CHRONIC BILATERAL LOW BACK PAIN WITH BILATERAL SCIATICA: ICD-10-CM

## 2020-04-07 DIAGNOSIS — G89.29 CHRONIC BILATERAL LOW BACK PAIN WITH BILATERAL SCIATICA: ICD-10-CM

## 2020-04-07 DIAGNOSIS — M54.41 CHRONIC BILATERAL LOW BACK PAIN WITH BILATERAL SCIATICA: ICD-10-CM

## 2020-04-08 RX ORDER — DICLOFENAC SODIUM 75 MG/1
75 TABLET, DELAYED RELEASE ORAL 2 TIMES DAILY
Qty: 60 TABLET | Refills: 1 | Status: SHIPPED | OUTPATIENT
Start: 2020-04-08 | End: 2020-06-01

## 2020-04-09 ENCOUNTER — TELEPHONE (OUTPATIENT)
Dept: UROLOGY | Facility: AMBULATORY SURGERY CENTER | Age: 45
End: 2020-04-09

## 2020-04-14 DIAGNOSIS — M51.36 LUMBAR DEGENERATIVE DISC DISEASE: ICD-10-CM

## 2020-04-14 RX ORDER — GABAPENTIN 400 MG/1
400 CAPSULE ORAL 3 TIMES DAILY
Qty: 90 CAPSULE | Refills: 1 | Status: SHIPPED | OUTPATIENT
Start: 2020-04-14 | End: 2020-08-10

## 2020-05-03 DIAGNOSIS — J45.40 MODERATE PERSISTENT ASTHMA WITHOUT COMPLICATION: ICD-10-CM

## 2020-05-04 RX ORDER — FLUTICASONE PROPIONATE AND SALMETEROL XINAFOATE 45; 21 UG/1; UG/1
AEROSOL, METERED RESPIRATORY (INHALATION)
Qty: 12 INHALER | Refills: 1 | Status: SHIPPED | OUTPATIENT
Start: 2020-05-04 | End: 2020-07-02

## 2020-05-12 ENCOUNTER — TELEMEDICINE (OUTPATIENT)
Dept: FAMILY MEDICINE CLINIC | Facility: CLINIC | Age: 45
End: 2020-05-12

## 2020-05-12 ENCOUNTER — TELEPHONE (OUTPATIENT)
Dept: FAMILY MEDICINE CLINIC | Facility: CLINIC | Age: 45
End: 2020-05-12

## 2020-05-12 VITALS — HEIGHT: 64 IN | TEMPERATURE: 97.8 F | BODY MASS INDEX: 41.37 KG/M2

## 2020-05-12 DIAGNOSIS — J96.01 ACUTE RESPIRATORY FAILURE WITH HYPOXIA (HCC): ICD-10-CM

## 2020-05-12 DIAGNOSIS — J45.40 MODERATE PERSISTENT ASTHMA WITHOUT COMPLICATION: ICD-10-CM

## 2020-05-12 DIAGNOSIS — U07.1 COVID-19: ICD-10-CM

## 2020-05-12 DIAGNOSIS — B37.0 THRUSH: Primary | ICD-10-CM

## 2020-05-12 PROCEDURE — 99214 OFFICE O/P EST MOD 30 MIN: CPT | Performed by: PHYSICIAN ASSISTANT

## 2020-05-12 PROCEDURE — T1015 CLINIC SERVICE: HCPCS | Performed by: PHYSICIAN ASSISTANT

## 2020-05-12 RX ORDER — ALBUTEROL SULFATE 2.5 MG/3ML
2.5 SOLUTION RESPIRATORY (INHALATION) EVERY 6 HOURS PRN
Qty: 10 VIAL | Refills: 1 | Status: SHIPPED | OUTPATIENT
Start: 2020-05-12 | End: 2021-10-22 | Stop reason: SDUPTHER

## 2020-05-13 ENCOUNTER — TELEPHONE (OUTPATIENT)
Dept: FAMILY MEDICINE CLINIC | Facility: CLINIC | Age: 45
End: 2020-05-13

## 2020-05-13 DIAGNOSIS — U07.1 COVID-19: ICD-10-CM

## 2020-05-13 DIAGNOSIS — J45.40 MODERATE PERSISTENT ASTHMA WITHOUT COMPLICATION: ICD-10-CM

## 2020-05-13 DIAGNOSIS — J96.01 ACUTE RESPIRATORY FAILURE WITH HYPOXIA (HCC): Primary | ICD-10-CM

## 2020-05-14 ENCOUNTER — TELEMEDICINE (OUTPATIENT)
Dept: FAMILY MEDICINE CLINIC | Facility: CLINIC | Age: 45
End: 2020-05-14

## 2020-05-14 VITALS — BODY MASS INDEX: 41.37 KG/M2 | HEIGHT: 64 IN | TEMPERATURE: 98 F

## 2020-05-14 DIAGNOSIS — U07.1 COVID-19: Primary | ICD-10-CM

## 2020-05-14 PROCEDURE — 99212 OFFICE O/P EST SF 10 MIN: CPT | Performed by: PHYSICIAN ASSISTANT

## 2020-05-14 PROCEDURE — T1015 CLINIC SERVICE: HCPCS | Performed by: PHYSICIAN ASSISTANT

## 2020-05-19 ENCOUNTER — HOSPITAL ENCOUNTER (OUTPATIENT)
Dept: PULMONOLOGY | Facility: HOSPITAL | Age: 45
Discharge: HOME | End: 2020-05-19

## 2020-05-19 ENCOUNTER — TRANSCRIBE ORDERS (OUTPATIENT)
Dept: ADMINISTRATIVE | Facility: HOSPITAL | Age: 45
End: 2020-05-19

## 2020-05-19 DIAGNOSIS — J45.40 MODERATE PERSISTENT ASTHMA WITHOUT COMPLICATION: ICD-10-CM

## 2020-05-21 ENCOUNTER — HOSPITAL ENCOUNTER (OUTPATIENT)
Dept: RADIOLOGY | Facility: HOSPITAL | Age: 45
Discharge: HOME | End: 2020-05-21
Payer: COMMERCIAL

## 2020-05-21 ENCOUNTER — TELEMEDICINE (OUTPATIENT)
Dept: FAMILY MEDICINE CLINIC | Facility: CLINIC | Age: 45
End: 2020-05-21

## 2020-05-21 DIAGNOSIS — U07.1 COVID-19: ICD-10-CM

## 2020-05-21 DIAGNOSIS — U07.1 COVID-19: Primary | ICD-10-CM

## 2020-05-21 PROCEDURE — T1015 CLINIC SERVICE: HCPCS | Performed by: PHYSICIAN ASSISTANT

## 2020-05-21 PROCEDURE — 99212 OFFICE O/P EST SF 10 MIN: CPT | Performed by: PHYSICIAN ASSISTANT

## 2020-05-21 PROCEDURE — 71046 X-RAY EXAM CHEST 2 VIEWS: CPT

## 2020-05-21 RX ORDER — GUAIFENESIN 600 MG
1200 TABLET, EXTENDED RELEASE 12 HR ORAL EVERY 12 HOURS SCHEDULED
Qty: 30 TABLET | Refills: 0 | Status: SHIPPED | OUTPATIENT
Start: 2020-05-21 | End: 2020-08-10

## 2020-05-26 ENCOUNTER — TELEMEDICINE (OUTPATIENT)
Dept: FAMILY MEDICINE CLINIC | Facility: CLINIC | Age: 45
End: 2020-05-26

## 2020-05-26 ENCOUNTER — TELEPHONE (OUTPATIENT)
Dept: FAMILY MEDICINE CLINIC | Facility: CLINIC | Age: 45
End: 2020-05-26

## 2020-05-26 DIAGNOSIS — U07.1 COVID-19: Primary | ICD-10-CM

## 2020-05-26 DIAGNOSIS — R09.02 HYPOXIA: ICD-10-CM

## 2020-05-26 PROCEDURE — 99213 OFFICE O/P EST LOW 20 MIN: CPT | Performed by: PHYSICIAN ASSISTANT

## 2020-05-26 PROCEDURE — T1015 CLINIC SERVICE: HCPCS | Performed by: PHYSICIAN ASSISTANT

## 2020-05-26 RX ORDER — BENZONATATE 200 MG/1
200 CAPSULE ORAL 3 TIMES DAILY PRN
Qty: 20 CAPSULE | Refills: 0 | Status: SHIPPED | OUTPATIENT
Start: 2020-05-26 | End: 2020-08-10

## 2020-05-26 RX ORDER — BENZONATATE 200 MG/1
200 CAPSULE ORAL 3 TIMES DAILY PRN
Qty: 20 CAPSULE | Refills: 0 | Status: SHIPPED | OUTPATIENT
Start: 2020-05-26 | End: 2020-05-26 | Stop reason: SDUPTHER

## 2020-05-27 ENCOUNTER — TELEPHONE (OUTPATIENT)
Dept: FAMILY MEDICINE CLINIC | Facility: CLINIC | Age: 45
End: 2020-05-27

## 2020-05-27 DIAGNOSIS — U07.1 COVID-19: ICD-10-CM

## 2020-05-27 DIAGNOSIS — J96.01 ACUTE RESPIRATORY FAILURE WITH HYPOXIA (HCC): ICD-10-CM

## 2020-05-27 DIAGNOSIS — R09.02 HYPOXIA: Primary | ICD-10-CM

## 2020-05-27 DIAGNOSIS — J45.40 MODERATE PERSISTENT ASTHMA WITHOUT COMPLICATION: ICD-10-CM

## 2020-05-31 DIAGNOSIS — J45.40 MODERATE PERSISTENT ASTHMA WITHOUT COMPLICATION: ICD-10-CM

## 2020-05-31 DIAGNOSIS — G89.29 CHRONIC BILATERAL LOW BACK PAIN WITH BILATERAL SCIATICA: ICD-10-CM

## 2020-05-31 DIAGNOSIS — M54.42 CHRONIC BILATERAL LOW BACK PAIN WITH BILATERAL SCIATICA: ICD-10-CM

## 2020-05-31 DIAGNOSIS — M54.41 CHRONIC BILATERAL LOW BACK PAIN WITH BILATERAL SCIATICA: ICD-10-CM

## 2020-06-01 RX ORDER — DICLOFENAC SODIUM 75 MG/1
TABLET, DELAYED RELEASE ORAL
Qty: 60 TABLET | Refills: 1 | Status: SHIPPED | OUTPATIENT
Start: 2020-06-01

## 2020-06-01 RX ORDER — ALBUTEROL SULFATE 90 UG/1
AEROSOL, METERED RESPIRATORY (INHALATION)
Qty: 18 INHALER | Refills: 1 | Status: SHIPPED | OUTPATIENT
Start: 2020-06-01 | End: 2020-11-04 | Stop reason: SDUPTHER

## 2020-06-17 ENCOUNTER — TELEPHONE (OUTPATIENT)
Dept: FAMILY MEDICINE CLINIC | Facility: CLINIC | Age: 45
End: 2020-06-17

## 2020-07-02 DIAGNOSIS — J45.40 MODERATE PERSISTENT ASTHMA WITHOUT COMPLICATION: ICD-10-CM

## 2020-07-02 RX ORDER — FLUTICASONE PROPIONATE AND SALMETEROL XINAFOATE 45; 21 UG/1; UG/1
AEROSOL, METERED RESPIRATORY (INHALATION)
Qty: 12 INHALER | Refills: 1 | Status: SHIPPED | OUTPATIENT
Start: 2020-07-02 | End: 2020-11-04 | Stop reason: SDUPTHER

## 2020-07-02 NOTE — TELEPHONE ENCOUNTER
Can you set her up for in person covid f/u also    She is 3 months out , no longer in infectious period

## 2020-07-13 ENCOUNTER — HOSPITAL ENCOUNTER (OUTPATIENT)
Dept: RADIOLOGY | Facility: HOSPITAL | Age: 45
Discharge: HOME | End: 2020-07-13
Payer: COMMERCIAL

## 2020-07-13 DIAGNOSIS — R09.02 HYPOXIA: ICD-10-CM

## 2020-07-13 DIAGNOSIS — U07.1 COVID-19: ICD-10-CM

## 2020-07-13 PROCEDURE — 71046 X-RAY EXAM CHEST 2 VIEWS: CPT

## 2020-07-17 NOTE — RESULT ENCOUNTER NOTE
Pneumonia has resolved  Very minimal scar in the lung is present but otherwise it looked good   I still want her to come in for a visit

## 2020-08-10 ENCOUNTER — OFFICE VISIT (OUTPATIENT)
Dept: FAMILY MEDICINE CLINIC | Facility: CLINIC | Age: 45
End: 2020-08-10

## 2020-08-10 ENCOUNTER — LAB (OUTPATIENT)
Dept: LAB | Facility: CLINIC | Age: 45
End: 2020-08-10
Payer: COMMERCIAL

## 2020-08-10 VITALS
BODY MASS INDEX: 42.17 KG/M2 | SYSTOLIC BLOOD PRESSURE: 110 MMHG | HEART RATE: 74 BPM | WEIGHT: 247 LBS | OXYGEN SATURATION: 97 % | HEIGHT: 64 IN | DIASTOLIC BLOOD PRESSURE: 60 MMHG | TEMPERATURE: 97.2 F | RESPIRATION RATE: 16 BRPM

## 2020-08-10 DIAGNOSIS — R25.2 SPASM: ICD-10-CM

## 2020-08-10 DIAGNOSIS — G89.29 CHRONIC BILATERAL LOW BACK PAIN WITH BILATERAL SCIATICA: ICD-10-CM

## 2020-08-10 DIAGNOSIS — E55.9 VITAMIN D DEFICIENCY: ICD-10-CM

## 2020-08-10 DIAGNOSIS — E66.01 MORBID OBESITY (HCC): ICD-10-CM

## 2020-08-10 DIAGNOSIS — L65.9 HAIR LOSS: ICD-10-CM

## 2020-08-10 DIAGNOSIS — G56.03 BILATERAL CARPAL TUNNEL SYNDROME: ICD-10-CM

## 2020-08-10 DIAGNOSIS — Z12.31 BREAST CANCER SCREENING BY MAMMOGRAM: ICD-10-CM

## 2020-08-10 DIAGNOSIS — F33.41 RECURRENT MAJOR DEPRESSIVE DISORDER, IN PARTIAL REMISSION (HCC): ICD-10-CM

## 2020-08-10 DIAGNOSIS — M54.42 CHRONIC BILATERAL LOW BACK PAIN WITH BILATERAL SCIATICA: ICD-10-CM

## 2020-08-10 DIAGNOSIS — R73.03 PREDIABETES: ICD-10-CM

## 2020-08-10 DIAGNOSIS — M54.41 CHRONIC BILATERAL LOW BACK PAIN WITH BILATERAL SCIATICA: ICD-10-CM

## 2020-08-10 DIAGNOSIS — M79.89 LEG SWELLING: ICD-10-CM

## 2020-08-10 DIAGNOSIS — M79.642 PAIN IN BOTH HANDS: ICD-10-CM

## 2020-08-10 DIAGNOSIS — Z12.4 CERVICAL CANCER SCREENING: Primary | ICD-10-CM

## 2020-08-10 DIAGNOSIS — J45.40 MODERATE PERSISTENT ASTHMA WITHOUT COMPLICATION: ICD-10-CM

## 2020-08-10 DIAGNOSIS — K21.9 GASTROESOPHAGEAL REFLUX DISEASE, ESOPHAGITIS PRESENCE NOT SPECIFIED: ICD-10-CM

## 2020-08-10 DIAGNOSIS — M79.641 PAIN IN BOTH HANDS: ICD-10-CM

## 2020-08-10 LAB
25(OH)D3 SERPL-MCNC: 28.1 NG/ML (ref 30–100)
ALBUMIN SERPL BCP-MCNC: 3 G/DL (ref 3.5–5)
ALP SERPL-CCNC: 107 U/L (ref 46–116)
ALT SERPL W P-5'-P-CCNC: 95 U/L (ref 12–78)
ANION GAP SERPL CALCULATED.3IONS-SCNC: 4 MMOL/L (ref 4–13)
AST SERPL W P-5'-P-CCNC: 117 U/L (ref 5–45)
BASOPHILS # BLD AUTO: 0.03 THOUSANDS/ΜL (ref 0–0.1)
BASOPHILS NFR BLD AUTO: 0 % (ref 0–1)
BILIRUB SERPL-MCNC: 0.35 MG/DL (ref 0.2–1)
BUN SERPL-MCNC: 11 MG/DL (ref 5–25)
CALCIUM SERPL-MCNC: 8.3 MG/DL (ref 8.3–10.1)
CHLORIDE SERPL-SCNC: 109 MMOL/L (ref 100–108)
CO2 SERPL-SCNC: 27 MMOL/L (ref 21–32)
CREAT SERPL-MCNC: 0.74 MG/DL (ref 0.6–1.3)
EOSINOPHIL # BLD AUTO: 0.23 THOUSAND/ΜL (ref 0–0.61)
EOSINOPHIL NFR BLD AUTO: 3 % (ref 0–6)
ERYTHROCYTE [DISTWIDTH] IN BLOOD BY AUTOMATED COUNT: 12.7 % (ref 11.6–15.1)
EST. AVERAGE GLUCOSE BLD GHB EST-MCNC: 128 MG/DL
GFR SERPL CREATININE-BSD FRML MDRD: 97 ML/MIN/1.73SQ M
GLUCOSE P FAST SERPL-MCNC: 120 MG/DL (ref 65–99)
HBA1C MFR BLD: 6.1 %
HCT VFR BLD AUTO: 40.3 % (ref 34.8–46.1)
HGB BLD-MCNC: 12.2 G/DL (ref 11.5–15.4)
IMM GRANULOCYTES # BLD AUTO: 0.02 THOUSAND/UL (ref 0–0.2)
IMM GRANULOCYTES NFR BLD AUTO: 0 % (ref 0–2)
LYMPHOCYTES # BLD AUTO: 2.61 THOUSANDS/ΜL (ref 0.6–4.47)
LYMPHOCYTES NFR BLD AUTO: 38 % (ref 14–44)
MAGNESIUM SERPL-MCNC: 2.2 MG/DL (ref 1.6–2.6)
MCH RBC QN AUTO: 30.9 PG (ref 26.8–34.3)
MCHC RBC AUTO-ENTMCNC: 30.3 G/DL (ref 31.4–37.4)
MCV RBC AUTO: 102 FL (ref 82–98)
MONOCYTES # BLD AUTO: 0.61 THOUSAND/ΜL (ref 0.17–1.22)
MONOCYTES NFR BLD AUTO: 9 % (ref 4–12)
NEUTROPHILS # BLD AUTO: 3.43 THOUSANDS/ΜL (ref 1.85–7.62)
NEUTS SEG NFR BLD AUTO: 50 % (ref 43–75)
NRBC BLD AUTO-RTO: 0 /100 WBCS
PLATELET # BLD AUTO: 213 THOUSANDS/UL (ref 149–390)
PMV BLD AUTO: 11.3 FL (ref 8.9–12.7)
POTASSIUM SERPL-SCNC: 4.3 MMOL/L (ref 3.5–5.3)
PROT SERPL-MCNC: 7.9 G/DL (ref 6.4–8.2)
RBC # BLD AUTO: 3.95 MILLION/UL (ref 3.81–5.12)
SODIUM SERPL-SCNC: 140 MMOL/L (ref 136–145)
TSH SERPL DL<=0.05 MIU/L-ACNC: 1.91 UIU/ML (ref 0.36–3.74)
VIT B12 SERPL-MCNC: 478 PG/ML (ref 100–900)
WBC # BLD AUTO: 6.93 THOUSAND/UL (ref 4.31–10.16)

## 2020-08-10 PROCEDURE — 3008F BODY MASS INDEX DOCD: CPT | Performed by: PHYSICIAN ASSISTANT

## 2020-08-10 PROCEDURE — 80053 COMPREHEN METABOLIC PANEL: CPT

## 2020-08-10 PROCEDURE — 3074F SYST BP LT 130 MM HG: CPT | Performed by: PHYSICIAN ASSISTANT

## 2020-08-10 PROCEDURE — 85025 COMPLETE CBC W/AUTO DIFF WBC: CPT

## 2020-08-10 PROCEDURE — 82607 VITAMIN B-12: CPT

## 2020-08-10 PROCEDURE — 99214 OFFICE O/P EST MOD 30 MIN: CPT | Performed by: PHYSICIAN ASSISTANT

## 2020-08-10 PROCEDURE — 3078F DIAST BP <80 MM HG: CPT | Performed by: PHYSICIAN ASSISTANT

## 2020-08-10 PROCEDURE — 82306 VITAMIN D 25 HYDROXY: CPT

## 2020-08-10 PROCEDURE — 83036 HEMOGLOBIN GLYCOSYLATED A1C: CPT

## 2020-08-10 PROCEDURE — 84443 ASSAY THYROID STIM HORMONE: CPT

## 2020-08-10 PROCEDURE — 36415 COLL VENOUS BLD VENIPUNCTURE: CPT

## 2020-08-10 PROCEDURE — 1036F TOBACCO NON-USER: CPT | Performed by: PHYSICIAN ASSISTANT

## 2020-08-10 PROCEDURE — 83735 ASSAY OF MAGNESIUM: CPT

## 2020-08-10 RX ORDER — HYDROCHLOROTHIAZIDE 25 MG/1
25 TABLET ORAL DAILY
Qty: 90 TABLET | Refills: 1 | Status: SHIPPED | OUTPATIENT
Start: 2020-08-10

## 2020-08-10 RX ORDER — OMEPRAZOLE 40 MG/1
40 CAPSULE, DELAYED RELEASE ORAL
Qty: 90 CAPSULE | Refills: 3 | Status: SHIPPED | OUTPATIENT
Start: 2020-08-10 | End: 2021-11-24

## 2020-08-10 RX ORDER — METHOCARBAMOL 750 MG/1
750 TABLET, FILM COATED ORAL EVERY 8 HOURS SCHEDULED
Qty: 90 TABLET | Refills: 3 | Status: SHIPPED | OUTPATIENT
Start: 2020-08-10 | End: 2022-05-18 | Stop reason: SDUPTHER

## 2020-08-10 RX ORDER — GABAPENTIN 600 MG/1
600 TABLET ORAL 3 TIMES DAILY
Qty: 90 TABLET | Refills: 3 | Status: SHIPPED | OUTPATIENT
Start: 2020-08-10 | End: 2020-12-18 | Stop reason: SDUPTHER

## 2020-08-10 NOTE — PROGRESS NOTES
Assessment/Plan:    Recurrent major depressive disorder, in partial remission ([facility] )  Continue follow up with with psych    Gastroesophageal reflux disease  She has been taking omeprazole 20 mg twice a day  Recommend increasing to 40 mg tablet  Asthma  Continue with Advair daily  Her breathing has been include proving  Recommend consultation with pulmonology  Chronic bilateral low back pain with bilateral sciatica  Gabapentin increased to 600 mg 3 times a day  She was referred to pain management  Morbid obesity ([place] [address] )  BMI Counseling: Body mass index is 42 4 kg/m²  The BMI is above normal  Nutrition recommendations include 3-5 servings of fruits/vegetables daily and increasing intake of lean protein  Problem List Items Addressed This Visit        Digestive    Gastroesophageal reflux disease     She has been taking omeprazole 20 mg twice a day  Recommend increasing to 40 mg tablet  Relevant Medications    omeprazole (PriLOSEC) 40 MG capsule       Respiratory    Asthma     Continue with Advair daily  Her breathing has been include proving  Recommend consultation with pulmonology  Nervous and Auditory    Chronic bilateral low back pain with bilateral sciatica     Gabapentin increased to 600 mg 3 times a day  She was referred to pain management  Relevant Medications    gabapentin (NEURONTIN) 600 MG tablet    methocarbamol (ROBAXIN) 750 mg tablet       Other    Pain in both hands    Relevant Orders    Ambulatory referral to Orthopedic Surgery    Leg swelling    Relevant Medications    hydrochlorothiazide (HYDRODIURIL) 25 mg tablet    Morbid obesity (HCC)     BMI Counseling: Body mass index is 42 4 kg/m²  The BMI is above normal  Nutrition recommendations include 3-5 servings of fruits/vegetables daily and increasing intake of lean protein             Recurrent major depressive disorder, in partial remission ([facility] )     Continue follow up with with psych           Other Visit Diagnoses     Cervical cancer screening    -  Primary    Relevant Orders    Ambulatory referral to Obstetrics / Gynecology    Breast cancer screening by mammogram        Relevant Orders    Mammo screening bilateral w 3d & cad    Prediabetes        Relevant Orders    Comprehensive metabolic panel    Hemoglobin A1c (w/out EAG)    TSH, 3rd generation    Vitamin D deficiency        Relevant Orders    Vitamin D 25 hydroxy    Hair loss        Relevant Orders    CBC and differential    TSH, 3rd generation    Vitamin B12    Bilateral carpal tunnel syndrome        Relevant Orders    Ambulatory referral to Orthopedic Surgery    Spasm        Relevant Orders    Magnesium            Subjective:      Patient ID: Daisha Valladares is a 50 y o  female  HPI 64-year-old female here for follow-up COVID pneumonia  She was admitted to [facility] on April [facility]th for this and was diagnosed with bilateral middle lower lung pneumonia  She was placed on oxygen  She was treated with lovenox and oxygen  She finished the lovenox  At last telemed visit she was still using 3 L oxygen  She is still having decreased taste  Sometimes she can taste like sugar or salt  Her viusion seems blurreed at times  She is still feeling fatigued  She feels like her breathing is now back to precovid and she has been using her asthma medications regularly  She is getting carpal tunnel oain in both hand but left is worse  She is right handed  She does have night braces but they dont help  She had emg which was normal in 2016  She gets pain in left thumb area and feels like ants are crawling on her  She feels like she needs to shake her arms out  She also needs referral for specialists in [place]  She needs to restart with pain management, sleep specialist and pulmonology  She continues to get chronic back pain and cramping  The gabapentin helps some and so does robaxin butit does not completely relieve the pain      The following portions of the patient's history were reviewed and updated as appropriate:   She  has a past medical history of Anemia, Anxiety, Arthritis, Asthma, Back pain, Carpal tunnel syndrome, Constipation, Depression, Environmental allergies, GERD (gastroesophageal reflux disease), H/O cardiac murmur, Insomnia, Kidney stone, Migraine, Psychiatric disorder, Pyelonephritis, and Wears glasses  She   Patient Active Problem List    Diagnosis Date Noted    Recurrent major depressive disorder, in partial remission ([facility] ) 08/10/2020    COVID-19 05/26/2020    Hematuria 01/10/2020    Pain in both hands 07/16/2019    Leg swelling 07/16/2019    Morbid obesity ([facility] ) 07/16/2019    Sacroiliitis ([facility] ) 03/18/2019    Chronic pain syndrome 11/26/2018    Lumbar spondylosis 11/26/2018    Lumbar disc herniation 11/26/2018    Varicose veins of right lower extremities with pain 06/05/2017    Anxiety 10/11/2016    Asthma 10/11/2016    Chronic bilateral low back pain with bilateral sciatica 10/11/2016    Gastroesophageal reflux disease 10/11/2016    Recurrent urinary tract infection 10/11/2016     She  has a past surgical history that includes Cholecystectomy; Varicose vein surgery (Left); Back surgery; pr phleb veins - extrem - to 20 (Left, 12/22/2016); [facility]atn [facility] sap[facility] (Left, 12/22/2016); Tubal ligation; Vein ligation (Right, 6/5/2017); Cystoscopy; and Esophagogastroduodenoscopy  Her family history includes Asthma in her father and mother; Breast cancer in her mother; Colon cancer in her mother; Coronary artery disease in her mother; Depression in her mother; Diabetes in her mother; Hypertension in her father and mother; Lung cancer in her father and mother; Varicose Veins in her mother  She  reports that she quit smoking about a year ago  Her smoking use included cigarettes  She smoked 0 50 packs per day  She has never used smokeless tobacco  She reports current alcohol use   She reports that she does not use drugs  Current Outpatient Medications   Medication Sig Dispense Refill    ADVAIR HFA 45-21 MCG/ACT inhaler INHALE 2 PUFFS TWICE A DAY RINSE MOUTH AFTER USE 12 Inhaler 1    albuterol (2 5 mg/3 mL) 0 083 % nebulizer solution Take 1 vial (2 5 mg total) by nebulization every 6 (six) hours as needed for wheezing or shortness of breath 10 vial 1    albuterol (PROVENTIL HFA,VENTOLIN HFA) 90 mcg/act inhaler INHALE 2 PUFFS BY MOUTH 4 TIMES A DAY 18 Inhaler 1    buPROPion (WELLBUTRIN SR) 200 MG 12 hr tablet       Cholecalciferol (VITAMIN D) 50 MCG (2000 UT) tablet Take 1 tablet (2,000 Units total) by mouth daily 90 tablet 1    clonazePAM (KlonoPIN) 1 mg tablet Take 1 tablet (1 mg total) by mouth 2 (two) times a day 45 tablet 0    diazepam (VALIUM) 2 mg tablet Take 2 mg by mouth 4 (four) times daily (after meals and at bedtime)      diclofenac (VOLTAREN) 75 mg EC tablet TAKE 1 TABLET BY MOUTH TWICE A DAY 60 tablet 1    fluticasone (FLONASE) 50 mcg/act nasal spray SPRAY 1 SPRAY INTO EACH NOSTRIL EVERY DAY 16 mL 2    hydrochlorothiazide (HYDRODIURIL) 25 mg tablet Take 1 tablet (25 mg total) by mouth daily 90 tablet 1    Misc   Devices (PULSE OXIMETER) MISC by Does not apply route daily 1 each 0    nystatin (MYCOSTATIN) 500,000 units/5 mL suspension Apply 5 mL (500,000 Units total) to the mouth or throat 4 (four) times a day 100 mL 0    phentermine 37 5 MG capsule Take 1 capsule (37 5 mg total) by mouth every morning 30 capsule 1    traMADol (ULTRAM) 50 mg tablet Take 50 mg by mouth every 6 (six) hours as needed for moderate pain      TRAZODONE HCL PO Take 100 mg by mouth daily at bedtime      cetirizine (ZyrTEC) 10 MG chewable tablet Chew 1 tablet (10 mg total) daily (Patient not taking: Reported on 1/10/2020) 90 tablet 1    ergocalciferol (VITAMIN D2) 50,000 units Take 1 capsule (50,000 Units total) by mouth once a week (Patient not taking: Reported on 1/16/2020) 4 capsule 2    gabapentin (NEURONTIN) 600 MG tablet Take 1 tablet (600 mg total) by mouth 3 (three) times a day 90 tablet 3    methocarbamol (ROBAXIN) 750 mg tablet Take 1 tablet (750 mg total) by mouth every 8 (eight) hours 90 tablet 3    omeprazole (PriLOSEC) 40 MG capsule Take 1 capsule (40 mg total) by mouth daily before breakfast 90 capsule 3    simvastatin (ZOCOR) 40 mg tablet TAKE 1 TABLET BY MOUTH DAILY AT BEDTIME (Patient not taking: Reported on 1/10/2020) 30 tablet 2    zolpidem (AMBIEN) 10 mg tablet Take 1 tablet (10 mg total) by mouth daily at bedtime (Patient not taking: Reported on 8/10/2020) 30 tablet 0     No current facility-administered medications for this visit  Current Outpatient Medications on File Prior to Visit   Medication Sig    ADVAIR HFA 45-21 MCG/ACT inhaler INHALE 2 PUFFS TWICE A DAY RINSE MOUTH AFTER USE    albuterol (2 5 mg/3 mL) 0 083 % nebulizer solution Take 1 vial (2 5 mg total) by nebulization every 6 (six) hours as needed for wheezing or shortness of breath    albuterol (PROVENTIL HFA,VENTOLIN HFA) 90 mcg/act inhaler INHALE 2 PUFFS BY MOUTH 4 TIMES A DAY    buPROPion (WELLBUTRIN SR) 200 MG 12 hr tablet     Cholecalciferol (VITAMIN D) 50 MCG (2000 UT) tablet Take 1 tablet (2,000 Units total) by mouth daily    clonazePAM (KlonoPIN) 1 mg tablet Take 1 tablet (1 mg total) by mouth 2 (two) times a day    diazepam (VALIUM) 2 mg tablet Take 2 mg by mouth 4 (four) times daily (after meals and at bedtime)    diclofenac (VOLTAREN) 75 mg EC tablet TAKE 1 TABLET BY MOUTH TWICE A DAY    fluticasone (FLONASE) 50 mcg/act nasal spray SPRAY 1 SPRAY INTO EACH NOSTRIL EVERY DAY    Misc   Devices (PULSE OXIMETER) MISC by Does not apply route daily    nystatin (MYCOSTATIN) 500,000 units/5 mL suspension Apply 5 mL (500,000 Units total) to the mouth or throat 4 (four) times a day    phentermine 37 5 MG capsule Take 1 capsule (37 5 mg total) by mouth every morning    traMADol (ULTRAM) 50 mg tablet Take 50 mg by mouth every 6 (six) hours as needed for moderate pain    TRAZODONE HCL PO Take 100 mg by mouth daily at bedtime    [DISCONTINUED] buPROPion (WELLBUTRIN) 100 mg tablet Take 100 mg by mouth 2 (two) times a day    [DISCONTINUED] gabapentin (NEURONTIN) 400 mg capsule Take 1 capsule (400 mg total) by mouth 3 (three) times a day    [DISCONTINUED] hydrochlorothiazide (HYDRODIURIL) 25 mg tablet TAKE 1 TABLET BY MOUTH EVERY DAY    [DISCONTINUED] omeprazole (PriLOSEC) 20 mg delayed release capsule Take 1 capsule (20 mg total) by mouth daily    cetirizine (ZyrTEC) 10 MG chewable tablet Chew 1 tablet (10 mg total) daily (Patient not taking: Reported on 1/10/2020)    ergocalciferol (VITAMIN D2) 50,000 units Take 1 capsule (50,000 Units total) by mouth once a week (Patient not taking: Reported on 1/16/2020)    simvastatin (ZOCOR) 40 mg tablet TAKE 1 TABLET BY MOUTH DAILY AT BEDTIME (Patient not taking: Reported on 1/10/2020)    zolpidem (AMBIEN) 10 mg tablet Take 1 tablet (10 mg total) by mouth daily at bedtime (Patient not taking: Reported on 8/10/2020)    [DISCONTINUED] benzonatate (TESSALON) 200 MG capsule Take 1 capsule (200 mg total) by mouth 3 (three) times a day as needed for cough (Patient not taking: Reported on 8/10/2020)    [DISCONTINUED] guaiFENesin (MUCINEX) 600 mg 12 hr tablet Take 2 tablets (1,200 mg total) by mouth every 12 (twelve) hours (Patient not taking: Reported on 8/10/2020)    [DISCONTINUED] methocarbamol (ROBAXIN) 500 mg tablet Take 1 tablet (500 mg total) by mouth 3 (three) times a day for 30 days     No current facility-administered medications on file prior to visit  She has No Known Allergies       Review of Systems   Constitutional: Positive for fatigue  Negative for activity change, appetite change, chills and unexpected weight change  HENT: Negative for ear pain, hearing loss and sore throat  Eyes: Negative for visual disturbance  Respiratory: Positive for cough  Negative for wheezing  Cardiovascular: Negative for chest pain  Gastrointestinal: Negative for abdominal pain, constipation, diarrhea and vomiting  Genitourinary: Negative for difficulty urinating and dysuria  Musculoskeletal: Positive for back pain  Negative for arthralgias and myalgias  Skin: Negative for rash  Hair loss     Neurological: Positive for numbness  Negative for dizziness and headaches  Psychiatric/Behavioral: Negative for behavioral problems  The patient is not nervous/anxious  Objective:      /60 (BP Location: Left arm, Patient Position: Sitting, Cuff Size: Large)   Pulse 74   Temp (!) 97 2 °F (36 2 °C) (Temporal)   Resp 16   Ht 5' 4" (1 626 m)   Wt 112 kg (247 lb)   SpO2 97%   Breastfeeding No   BMI 42 40 kg/m²          Physical Exam  Vitals signs and nursing note reviewed  Constitutional:       General: She is not in acute distress  Appearance: She is well-developed  HENT:      Head: Normocephalic and atraumatic  Right Ear: External ear normal       Left Ear: External ear normal    Eyes:      Conjunctiva/sclera: Conjunctivae normal    Neck:      Musculoskeletal: Normal range of motion and neck supple  Thyroid: No thyromegaly  Cardiovascular:      Rate and Rhythm: Normal rate and regular rhythm  Heart sounds: Normal heart sounds  No murmur  Pulmonary:      Effort: Pulmonary effort is normal  No respiratory distress  Breath sounds: Normal breath sounds  No wheezing  Lymphadenopathy:      Cervical: No cervical adenopathy  Neurological:      Mental Status: She is alert and oriented to person, place, and time  Psychiatric:         Behavior: Behavior normal        5 minute walk test 98% pulse ox

## 2020-08-10 NOTE — ASSESSMENT & PLAN NOTE
Continue with Advair daily  Her breathing has been include proving  Recommend consultation with pulmonology

## 2020-08-10 NOTE — ASSESSMENT & PLAN NOTE
BMI Counseling: Body mass index is 42 4 kg/m²  The BMI is above normal  Nutrition recommendations include 3-5 servings of fruits/vegetables daily and increasing intake of lean protein

## 2020-08-12 ENCOUNTER — TELEPHONE (OUTPATIENT)
Dept: FAMILY MEDICINE CLINIC | Facility: CLINIC | Age: 45
End: 2020-08-12

## 2020-08-12 NOTE — TELEPHONE ENCOUNTER
[id number] spoke w/ patient who stated she was able to drive to [place] to have Pulmonology and sleep medicine appointments  She stated she needs Wednesday, afternoons

## 2020-08-12 NOTE — TELEPHONE ENCOUNTER
Sleep Medicine-9/2/20 @ 1:40pm  [address]  Pulmonology-8/26/20 @ 1pm  [place] [id number] called and spoke directly with patient and relayed both appointment details (time date location) patient agreeable and expressed understanding  In regard to pain management referral I reopened and assigned to pain management, and they should be reaching out to contact patient for consult   relayed I am unable to schedule for pain management, but should be on look out for call from pain to start scheduling consultation process

## 2020-08-12 NOTE — TELEPHONE ENCOUNTER
[id number] at 8/10/2020  2:15 PM EDT -----  Regarding: referrals  I didn't want to place new referrals yet since I keep referring her to the same place  She states she has a referral to Sleep Medicine, pain management and pulmonology  She lives near [place] and we try to set her up near there in the past but she said that all the office is there will take her because of her insurance and [facility]  I do not know if this is even true    But I do not know if we can help her get these appointments

## 2020-08-14 ENCOUNTER — TELEPHONE (OUTPATIENT)
Dept: FAMILY MEDICINE CLINIC | Facility: CLINIC | Age: 45
End: 2020-08-14

## 2020-08-14 ENCOUNTER — TELEMEDICINE (OUTPATIENT)
Dept: FAMILY MEDICINE CLINIC | Facility: CLINIC | Age: 45
End: 2020-08-14

## 2020-08-14 DIAGNOSIS — J45.40 MODERATE PERSISTENT ASTHMA WITHOUT COMPLICATION: ICD-10-CM

## 2020-08-14 DIAGNOSIS — J96.01 ACUTE RESPIRATORY FAILURE WITH HYPOXIA (HCC): Primary | ICD-10-CM

## 2020-08-14 DIAGNOSIS — J06.9 ACUTE URI: Primary | ICD-10-CM

## 2020-08-14 DIAGNOSIS — E55.9 VITAMIN D DEFICIENCY: ICD-10-CM

## 2020-08-14 PROBLEM — R73.03 PREDIABETES: Status: ACTIVE | Noted: 2020-08-14

## 2020-08-14 PROCEDURE — 99214 OFFICE O/P EST MOD 30 MIN: CPT | Performed by: PHYSICIAN ASSISTANT

## 2020-08-14 PROCEDURE — 1036F TOBACCO NON-USER: CPT | Performed by: PHYSICIAN ASSISTANT

## 2020-08-14 RX ORDER — FLUTICASONE PROPIONATE 50 MCG
2 SPRAY, SUSPENSION (ML) NASAL DAILY
Qty: 1 BOTTLE | Refills: 0 | Status: SHIPPED | OUTPATIENT
Start: 2020-08-14 | End: 2020-09-16

## 2020-08-14 RX ORDER — BENZONATATE 200 MG/1
200 CAPSULE ORAL 3 TIMES DAILY PRN
Qty: 20 CAPSULE | Refills: 0 | Status: SHIPPED | OUTPATIENT
Start: 2020-08-14 | End: 2020-11-04 | Stop reason: SDUPTHER

## 2020-08-14 RX ORDER — ERGOCALCIFEROL 1.25 MG/1
50000 CAPSULE ORAL WEEKLY
Qty: 4 CAPSULE | Refills: 2 | Status: SHIPPED | OUTPATIENT
Start: 2020-08-14

## 2020-08-14 NOTE — PROGRESS NOTES
Virtual Brief Visit    Assessment/Plan:    Problem List Items Addressed This Visit        Respiratory    Asthma     Continue advair and PRN albuterol            Other    Vitamin D deficiency     To start ergocalciferol 52475 weekly x 3 months         Relevant Medications    ergocalciferol (VITAMIN D2) 50,000 units      Other Visit Diagnoses     Acute URI    -  Primary    Relevant Medications    benzonatate (TESSALON) 200 MG capsule    fluticasone (FLONASE) 50 mcg/act nasal spray                Reason for visit is   Chief Complaint   Patient presents with    Virtual Brief Visit        Encounter provider Coby Castro PA-C    Provider located at [address] [place] [address]  [address] [place] [id number] [phone]    Recent Visits  Date Type Provider Dept   08/12/20 Telephone [facility]  [facility]   08/10/20 Office Visit Shani Nuñez PA-C  [facility]   Showing recent visits within past 7 days and meeting all other requirements     Today's Visits  Date Type Provider Dept   08/14/20 Telemedicine [facility] Leija [facility]   Showing today's visits and meeting all other requirements     Future Appointments  No visits were found meeting these conditions  Showing future appointments within next 150 days and meeting all other requirements        After connecting through telephone, the patient was identified by name and date of birth  Tracy Braswell was informed that this is a telemedicine visit and that the visit is being conducted through telephone  My office door was closed  No one else was in the room  She acknowledged consent and understanding of privacy and security of the platform  The patient has agreed to participate and understands she can discontinue the visit at any time  It was my intent to perform this visit via video technology but the patient was not able to do a video connection so the visit was completed via audio telephone alone        Patient is aware this is a billable service  Maribel Bethea is a 50 y o  female for regular follow up but she has been having cold symptoms  She is having cold symptoms for last 5 days  She is having congestion, cough  She does have asthma and was on oxygen after covid but has been off it for about 6-8 weeks  She has been using her albuterol more frequent  She has been taking albuterol 1-2 times a day  She is using dayquil and nyquil  It does help some but then nasal congestion returns  No f/c  HPI     Past Medical History:   Diagnosis Date    Anemia     Anxiety     Arthritis     hands, knee    Asthma     uses inhalers for the same   No recnt flairups    Back pain     Carpal tunnel syndrome     Bilateral    Constipation     Depression     Environmental allergies     GERD (gastroesophageal reflux disease)     H/O cardiac murmur     Insomnia     Kidney stone     Migraine     Psychiatric disorder     anxiety    Pyelonephritis     Wears glasses        Past Surgical History:   Procedure Laterality Date    BACK SURGERY      States she isn't sure what she had done-possibly something to do with a lump or muscle    CHOLECYSTECTOMY      CYSTOSCOPY      onset: 6/1/16, resolved: 6/1/16    ESOPHAGOGASTRODUODENOSCOPY      CO LIGATN LONG SAPHENOUS VEIN AT [facility] Left 12/22/2016    Procedure: LIGATION/STRIPPING VEIN, LIGATION OF ANTERIOR TRIBUTARY GREATER SAPHENOUS VEIN BRANCH;  Surgeon: DO Lizeth;  [facility]: [facility];  Service: Vascular    CO PHLEB VEINS - EXTREM - TO 20 Left 12/22/2016    Procedure: MULTIPLE STAB PHLEBECTOMIES;  Surgeon: DO Lizeth;  [facility];  Service: Vascular    TUBAL LIGATION      VARICOSE VEIN SURGERY Left     Left leg    VEIN LIGATION Right 6/5/2017    Procedure: LIGATION GREATER SAPHENOUS VEIN TRIBUTARY WITH STAB PHLEBECTOMIES ;  Surgeon: DO Lizeth;  [facility];  Service:        Current Outpatient Medications   Medication Sig Dispense Refill    ADVAIR HFA 45-21 MCG/ACT inhaler INHALE 2 PUFFS TWICE A DAY RINSE MOUTH AFTER USE 12 Inhaler 1    albuterol (2 5 mg/3 mL) 0 083 % nebulizer solution Take 1 vial (2 5 mg total) by nebulization every 6 (six) hours as needed for wheezing or shortness of breath 10 vial 1    albuterol (PROVENTIL HFA,VENTOLIN HFA) 90 mcg/act inhaler INHALE 2 PUFFS BY MOUTH 4 TIMES A DAY 18 Inhaler 1    benzonatate (TESSALON) 200 MG capsule Take 1 capsule (200 mg total) by mouth 3 (three) times a day as needed for cough 20 capsule 0    buPROPion (WELLBUTRIN SR) 200 MG 12 hr tablet       cetirizine (ZyrTEC) 10 MG chewable tablet Chew 1 tablet (10 mg total) daily (Patient not taking: Reported on 1/10/2020) 90 tablet 1    Cholecalciferol (VITAMIN D) 50 MCG (2000 UT) tablet Take 1 tablet (2,000 Units total) by mouth daily 90 tablet 1    clonazePAM (KlonoPIN) 1 mg tablet Take 1 tablet (1 mg total) by mouth 2 (two) times a day 45 tablet 0    diazepam (VALIUM) 2 mg tablet Take 2 mg by mouth 4 (four) times daily (after meals and at bedtime)      diclofenac (VOLTAREN) 75 mg EC tablet TAKE 1 TABLET BY MOUTH TWICE A DAY 60 tablet 1    ergocalciferol (VITAMIN D2) 50,000 units Take 1 capsule (50,000 Units total) by mouth once a week (Patient not taking: Reported on 1/16/2020) 4 capsule 2    ergocalciferol (VITAMIN D2) 50,000 units Take 1 capsule (50,000 Units total) by mouth once a week 4 capsule 2    fluticasone (FLONASE) 50 mcg/act nasal spray SPRAY 1 SPRAY INTO EACH NOSTRIL EVERY DAY 16 mL 2    fluticasone (FLONASE) 50 mcg/act nasal spray 2 sprays into each nostril daily 1 Bottle 0    gabapentin (NEURONTIN) 600 MG tablet Take 1 tablet (600 mg total) by mouth 3 (three) times a day 90 tablet 3    hydrochlorothiazide (HYDRODIURIL) 25 mg tablet Take 1 tablet (25 mg total) by mouth daily 90 tablet 1    methocarbamol (ROBAXIN) 750 mg tablet Take 1 tablet (750 mg total) by mouth every 8 (eight) hours 90 tablet 3    Misc  Devices (PULSE OXIMETER) MISC by Does not apply route daily 1 each 0    nystatin (MYCOSTATIN) 500,000 units/5 mL suspension Apply 5 mL (500,000 Units total) to the mouth or throat 4 (four) times a day 100 mL 0    omeprazole (PriLOSEC) 40 MG capsule Take 1 capsule (40 mg total) by mouth daily before breakfast 90 capsule 3    phentermine 37 5 MG capsule Take 1 capsule (37 5 mg total) by mouth every morning 30 capsule 1    simvastatin (ZOCOR) 40 mg tablet TAKE 1 TABLET BY MOUTH DAILY AT BEDTIME (Patient not taking: Reported on 1/10/2020) 30 tablet 2    traMADol (ULTRAM) 50 mg tablet Take 50 mg by mouth every 6 (six) hours as needed for moderate pain      TRAZODONE HCL PO Take 100 mg by mouth daily at bedtime      zolpidem (AMBIEN) 10 mg tablet Take 1 tablet (10 mg total) by mouth daily at bedtime (Patient not taking: Reported on 8/10/2020) 30 tablet 0     No current facility-administered medications for this visit  No Known Allergies    Review of Systems   Constitutional: Positive for fatigue  Negative for chills and fever  HENT: Positive for congestion  Negative for sinus pressure and sinus pain  Respiratory: Positive for cough  Negative for shortness of breath and wheezing  Cardiovascular: Negative for chest pain  Musculoskeletal: Positive for back pain  Negative for myalgias  Neurological: Positive for headaches  There were no vitals filed for this visit  I spent 16 minutes directly with the patient during this visit    [address] acknowledges that she has consented to an online visit or consultation  She understands that the online visit is based solely on information provided by her, and that, in the absence of a face-to-face physical evaluation by the physician, the diagnosis she receives is both limited and provisional in terms of accuracy and completeness   This is not intended to replace a full medical face-to-face evaluation by the physician  Geovani Neal understands and accepts these terms

## 2020-08-14 NOTE — TELEPHONE ENCOUNTER
----- [id number] sent at 8/14/2020  8:26 AM EDT -----  Regarding: oxygen  She is no longer using oxygen  Can we contact the company to come pick it up

## 2020-08-25 ENCOUNTER — DOCUMENTATION (OUTPATIENT)
Dept: PULMONOLOGY | Facility: CLINIC | Age: 45
End: 2020-08-25

## 2020-08-25 NOTE — PROGRESS NOTES
Pulmonary Consultation   Param Carver 50 y o  female MRN: [id number]  8/26/2020      Reason for Consultation:  Moderate persistent asthma    Requested by:  Shani     Assessment/Plan:    1  Cough  Assessment & Plan:  Patient reports cough with throat irritation causing her to wake up in the middle of the night and use of albuterol multiple times  Differential includes postnasal drip/upper airway cough syndrome, GERD, allergies  -continue PPI  -plan for allergies under seasonal/environmental allergies      Orders:  -     fexofenadine (ALLEGRA) 60 MG tablet; Take 1 tablet (60 mg total) by mouth daily  -     azelastine (ASTELIN) 0 1 % nasal spray; 1 spray into each nostril 2 (two) times a day Use in each nostril as directed    2  Moderate persistent asthma without complication  Assessment & Plan:  -Discontinue Flovent    -continue Advair and albuterol p r n  Orders:  -     Ambulatory referral to Pulmonology  -     Spacer Device for Inhaler  -     [facility] Allergy Panel, Adult; Future; Expected date: 08/26/2020    3  COVID-19  Assessment & Plan:  Recently discontinued supplemental oxygen  Asthma may be worsening secondary to COVID-19 pneumonia  Treat cough and continue asthma controlling medications  Continue to monitor      4  Morbid obesity ([address] )  Assessment & Plan:  Advised diet and exercise      5  Gastroesophageal reflux disease, esophagitis presence not specified  Assessment & Plan:  Continue PPI      6  Environmental and seasonal allergies  Assessment & Plan:  Possibly contributing to cough, throat/nasal irritation causing worsening asthma symptoms and frequent albuterol inhaler use   -continue Flonase  -add azelastin nasal spray  -Benadryl q h s  with Allegra during the day  -obtain [facility] allergy panel    Orders:  -     fexofenadine (ALLEGRA) 60 MG tablet;  Take 1 tablet (60 mg total) by mouth daily  -     azelastine (ASTELIN) 0 1 % nasal spray; 1 spray into each nostril 2 (two) times a day Use in each nostril as directed  -     [facility] Allergy Panel, Adult; Future; Expected date: 08/26/2020        History of Present Illness   HPI:  Olga Mcmahon is a 50 y o  female with history of GERD, seasonal allergies, migraines, seasonal/environmental allergies, morbid obesity, depression/anxiety presenting today for evaluation of asthma  She was initially diagnosed with asthma after wheezing episode and shortness of breath that improved with albuterol approximately 10 years ago  Patient states that she has had ongoing issues with her asthma requiring multiple doses of rescue inhaler despite use of Advair and Flovent since early to mid 2019  Patient reports that she is using her albuterol rescue inhaler 3-4 times daily specially with exertion and she is having nighttime symptoms approximately 4 times a week requiring albuterol inhaler use  She states that often times when she wakes up in the middle of night she has irritation in the back of her throat with a metallic taste which prompted her to wake up clear throat and sometimes she experiences chest tightness required albuterol inhaler use  Patient also had 2 visits to the emergency department for asthma exacerbation from 2019 to 2020  Of note patient states that she had COVID-19 pneumonia in April 2020 requiring hospitalization for approximately 2-3 days  She was discharged on 3 L of nasal cannula oxygen which she discontinued on August 10, 2020  Since then her asthma has worsened  Patient also states that she snores, gasps for air in the middle night, wakes up feeling unrefreshed, becomes drowsy while driving and sometimes hits the rumble strip, has daytime confusion  She is scheduled for sleep study next Wednesday 09/02/2020  Occupational History:  Homemaker  Lives in an apartment with , children, daughter-in-law, 2 grandchildren  Social History:  Reports smoking for 2 years as a teenager  Ten cigarettes a day    Denies any marijuana use or recreational drug use  No alcohol use  Malignancy History:  Father has lung cancer from smoking    Pets/animal exposure:  Denies any exposure to birds  Has pet fish    Inhalers: Albuterol, Advair, Flovent      Review of systems:  12 point review of systems was completed and was otherwise negative except as listed in HPI  Historical Information   Past Medical History:   Diagnosis Date    Anemia     Anxiety     Arthritis     hands, knee    Asthma     uses inhalers for the same   No recnt flairups    Back pain     Carpal tunnel syndrome     Bilateral    Constipation     Depression     Environmental allergies     GERD (gastroesophageal reflux disease)     H/O cardiac murmur     Insomnia     Kidney stone     Migraine     Psychiatric disorder     anxiety    Pyelonephritis     Wears glasses      Past Surgical History:   Procedure Laterality Date    BACK SURGERY      States she isn't sure what she had done-possibly something to do with a lump or muscle    CHOLECYSTECTOMY      CYSTOSCOPY      onset: 6/1/16, resolved: 6/1/16    ESOPHAGOGASTRODUODENOSCOPY      AL LIGATN LONG SAPHENOUS VEIN AT [facility] Left 12/22/2016    Procedure: LIGATION/STRIPPING VEIN, LIGATION OF ANTERIOR TRIBUTARY GREATER SAPHENOUS VEIN BRANCH;  Surgeon: Maryan Crook DO;  [facility];  Service: Vascular    AL PHLEB VEINS - EXTREM - TO 20 Left 12/22/2016    Procedure: MULTIPLE STAB PHLEBECTOMIES;  Surgeon: Maryan Crook DO;  [facility];  Service: Vascular    TUBAL LIGATION      VARICOSE VEIN SURGERY Left     Left leg    VEIN LIGATION Right 6/5/2017    Procedure: LIGATION GREATER SAPHENOUS VEIN TRIBUTARY WITH STAB PHLEBECTOMIES ;  Surgeon: Maryan Crook DO;  [facility];  Service:      Family History   Problem Relation Age of Onset    Colon cancer Mother         ascending    Asthma Mother     Coronary artery disease Mother     Depression Mother     Diabetes Mother  Hypertension Mother     Lung cancer Mother    Alejandrina Mare Breast cancer Mother     Varicose Veins Mother     Asthma Father     Hypertension Father     Lung cancer Father          Meds/Allergies     Current Outpatient Medications:     ADVAIR HFA 45-21 MCG/ACT inhaler, INHALE 2 PUFFS TWICE A DAY RINSE MOUTH AFTER USE, Disp: 12 Inhaler, Rfl: 1    albuterol (2 5 mg/3 mL) 0 083 % nebulizer solution, Take 1 vial (2 5 mg total) by nebulization every 6 (six) hours as needed for wheezing or shortness of breath, Disp: 10 vial, Rfl: 1    albuterol (PROVENTIL HFA,VENTOLIN HFA) 90 mcg/act inhaler, INHALE 2 PUFFS BY MOUTH 4 TIMES A DAY, Disp: 18 Inhaler, Rfl: 1    benzonatate (TESSALON) 200 MG capsule, Take 1 capsule (200 mg total) by mouth 3 (three) times a day as needed for cough, Disp: 20 capsule, Rfl: 0    buPROPion (WELLBUTRIN SR) 200 MG 12 hr tablet, , Disp: , Rfl:     Cholecalciferol (VITAMIN D) 50 MCG (2000 UT) tablet, Take 1 tablet (2,000 Units total) by mouth daily, Disp: 90 tablet, Rfl: 1    clonazePAM (KlonoPIN) 1 mg tablet, Take 1 tablet (1 mg total) by mouth 2 (two) times a day, Disp: 45 tablet, Rfl: 0    diazepam (VALIUM) 2 mg tablet, Take 2 mg by mouth 4 (four) times daily (after meals and at bedtime), Disp: , Rfl:     diclofenac (VOLTAREN) 75 mg EC tablet, TAKE 1 TABLET BY MOUTH TWICE A DAY, Disp: 60 tablet, Rfl: 1    ergocalciferol (VITAMIN D2) 50,000 units, Take 1 capsule (50,000 Units total) by mouth once a week, Disp: 4 capsule, Rfl: 2    fluticasone (FLONASE) 50 mcg/act nasal spray, SPRAY 1 SPRAY INTO EACH NOSTRIL EVERY DAY, Disp: 16 mL, Rfl: 2    fluticasone (FLONASE) 50 mcg/act nasal spray, 2 sprays into each nostril daily, Disp: 1 Bottle, Rfl: 0    gabapentin (NEURONTIN) 600 MG tablet, Take 1 tablet (600 mg total) by mouth 3 (three) times a day, Disp: 90 tablet, Rfl: 3    hydrochlorothiazide (HYDRODIURIL) 25 mg tablet, Take 1 tablet (25 mg total) by mouth daily, Disp: 90 tablet, Rfl: 1   methocarbamol (ROBAXIN) 750 mg tablet, Take 1 tablet (750 mg total) by mouth every 8 (eight) hours, Disp: 90 tablet, Rfl: 3    Misc  Devices (PULSE OXIMETER) MISC, by Does not apply route daily, Disp: 1 each, Rfl: 0    nystatin (MYCOSTATIN) 500,000 units/5 mL suspension, Apply 5 mL (500,000 Units total) to the mouth or throat 4 (four) times a day, Disp: 100 mL, Rfl: 0    omeprazole (PriLOSEC) 40 MG capsule, Take 1 capsule (40 mg total) by mouth daily before breakfast, Disp: 90 capsule, Rfl: 3    phentermine 37 5 MG capsule, Take 1 capsule (37 5 mg total) by mouth every morning, Disp: 30 capsule, Rfl: 1    traMADol (ULTRAM) 50 mg tablet, Take 50 mg by mouth every 6 (six) hours as needed for moderate pain, Disp: , Rfl:     TRAZODONE HCL PO, Take 100 mg by mouth daily at bedtime, Disp: , Rfl:     azelastine (ASTELIN) 0 1 % nasal spray, 1 spray into each nostril 2 (two) times a day Use in each nostril as directed, Disp: 1 Bottle, Rfl: 1    ergocalciferol (VITAMIN D2) 50,000 units, Take 1 capsule (50,000 Units total) by mouth once a week (Patient not taking: Reported on 1/16/2020), Disp: 4 capsule, Rfl: 2    fexofenadine (ALLEGRA) 60 MG tablet, Take 1 tablet (60 mg total) by mouth daily, Disp: 30 tablet, Rfl: 1    simvastatin (ZOCOR) 40 mg tablet, TAKE 1 TABLET BY MOUTH DAILY AT BEDTIME (Patient not taking: Reported on 1/10/2020), Disp: 30 tablet, Rfl: 2    zolpidem (AMBIEN) 10 mg tablet, Take 1 tablet (10 mg total) by mouth daily at bedtime (Patient not taking: Reported on 8/10/2020), Disp: 30 tablet, Rfl: 0  No Known Allergies    Vitals: Blood pressure 134/70, pulse 70, temperature (!) 97 3 °F (36 3 °C), height 5' 5" (1 651 m), weight 112 kg (247 lb), SpO2 97 %, not currently breastfeeding , Body mass index is 41 1 kg/m²  Oxygen Therapy  SpO2: 97 %  Oxygen Therapy: None (Room air)    Physical Exam  Physical Exam  Constitutional:       General: She is not in acute distress  Appearance: She is obese   She is not ill-appearing, toxic-appearing or diaphoretic  HENT:      Head: Normocephalic and atraumatic  Right Ear: External ear normal       Left Ear: External ear normal       Nose: Nose normal    Eyes:      General: No scleral icterus  Right eye: No discharge  Left eye: No discharge  Extraocular Movements: Extraocular movements intact  Conjunctiva/sclera: Conjunctivae normal       Pupils: Pupils are equal, round, and reactive to light  Cardiovascular:      Rate and Rhythm: Normal rate and regular rhythm  Pulses: Normal pulses  Heart sounds: Normal heart sounds  No murmur  No friction rub  No gallop  Pulmonary:      Effort: Pulmonary effort is normal  No respiratory distress  Breath sounds: Normal breath sounds  No stridor  No wheezing, rhonchi or rales  Chest:      Chest wall: No tenderness  Abdominal:      General: Bowel sounds are normal       Palpations: Abdomen is soft  Musculoskeletal:         General: No swelling  Right lower leg: No edema  Left lower leg: No edema  Skin:     General: Skin is warm and dry  Neurological:      Mental Status: She is alert and oriented to person, place, and time  Labs: I have personally reviewed pertinent lab results  , ABG: No results found for: PHART, MLP9NGK, PO2ART, SIT0YVQ, G5TDVPSK, BEART, SOURCE, BNP: No results found for: BNP, CBC: No results found for: WBC, HGB, HCT, MCV, PLT, ADJUSTEDWBC, MCH, MCHC, RDW, MPV, NRBC, CMP: No results found for: SODIUM, K, CL, CO2, ANIONGAP, BUN, CREATININE, GLUCOSE, CALCIUM, AST, ALT, ALKPHOS, PROT, BILITOT, EGFR, PT/INR: No results found for: PT, INR, Troponin: No results found for: TROPONINI  Lab Results   Component Value Date    WBC 6 93 08/10/2020    HGB 12 2 08/10/2020    HCT 40 3 08/10/2020     (H) 08/10/2020     08/10/2020     Lab Results   Component Value Date    GLUCOSE 102 12/16/2015    CALCIUM 8 3 08/10/2020     04/27/2018    K 4 3 08/10/2020    CO2 27 08/10/2020     (H) 08/10/2020    BUN 11 08/10/2020    CREATININE 0 74 08/10/2020     Lab Results   Component Value Date    IGE 82 12/16/2015    IGE 82 12/16/2015     Lab Results   Component Value Date    ALT 95 (H) 08/10/2020     (H) 08/10/2020    ALKPHOS 107 08/10/2020    BILITOT 0 5 04/27/2018         Imaging and other studies: I have personally reviewed pertinent reports  and I have personally reviewed pertinent films in PACS    Pulmonary function testing:  Pending    EKG, Pathology, and Other Studies: I have personally reviewed pertinent reports     and I have personally reviewed pertinent films in PACS      [address], DO  [facility]

## 2020-08-25 NOTE — PROGRESS NOTES

## 2020-08-26 ENCOUNTER — OFFICE VISIT (OUTPATIENT)
Dept: PULMONOLOGY | Facility: CLINIC | Age: 45
End: 2020-08-26
Payer: COMMERCIAL

## 2020-08-26 VITALS
DIASTOLIC BLOOD PRESSURE: 70 MMHG | HEIGHT: 65 IN | WEIGHT: 247 LBS | TEMPERATURE: 97.3 F | SYSTOLIC BLOOD PRESSURE: 134 MMHG | OXYGEN SATURATION: 97 % | BODY MASS INDEX: 41.15 KG/M2 | HEART RATE: 70 BPM

## 2020-08-26 DIAGNOSIS — E66.01 MORBID OBESITY (HCC): ICD-10-CM

## 2020-08-26 DIAGNOSIS — K21.9 GASTROESOPHAGEAL REFLUX DISEASE, ESOPHAGITIS PRESENCE NOT SPECIFIED: ICD-10-CM

## 2020-08-26 DIAGNOSIS — J45.40 MODERATE PERSISTENT ASTHMA WITHOUT COMPLICATION: ICD-10-CM

## 2020-08-26 DIAGNOSIS — R05.9 COUGH: Primary | ICD-10-CM

## 2020-08-26 DIAGNOSIS — U07.1 COVID-19: ICD-10-CM

## 2020-08-26 DIAGNOSIS — J30.89 ENVIRONMENTAL AND SEASONAL ALLERGIES: ICD-10-CM

## 2020-08-26 PROCEDURE — 3008F BODY MASS INDEX DOCD: CPT | Performed by: PHYSICIAN ASSISTANT

## 2020-08-26 PROCEDURE — 1036F TOBACCO NON-USER: CPT | Performed by: INTERNAL MEDICINE

## 2020-08-26 PROCEDURE — 99244 OFF/OP CNSLTJ NEW/EST MOD 40: CPT | Performed by: INTERNAL MEDICINE

## 2020-08-26 PROCEDURE — 3008F BODY MASS INDEX DOCD: CPT | Performed by: INTERNAL MEDICINE

## 2020-08-26 RX ORDER — FEXOFENADINE HYDROCHLORIDE 60 MG/1
60 TABLET, FILM COATED ORAL DAILY
Qty: 30 TABLET | Refills: 1 | Status: SHIPPED | OUTPATIENT
Start: 2020-08-26 | End: 2020-11-04 | Stop reason: SDUPTHER

## 2020-08-26 RX ORDER — AZELASTINE 1 MG/ML
1 SPRAY, METERED NASAL 2 TIMES DAILY
Qty: 1 BOTTLE | Refills: 1 | Status: SHIPPED | OUTPATIENT
Start: 2020-08-26 | End: 2020-11-04 | Stop reason: SDUPTHER

## 2020-08-26 NOTE — ASSESSMENT & PLAN NOTE
Possibly contributing to cough, throat/nasal irritation causing worsening asthma symptoms and frequent albuterol inhaler use   -continue Flonase  -add azelastin nasal spray  -Benadryl q h s  with Allegra during the day  -obtain [facility] allergy panel

## 2020-08-26 NOTE — ASSESSMENT & PLAN NOTE
Patient reports cough with throat irritation causing her to wake up in the middle of the night and use of albuterol multiple times    Differential includes postnasal drip/upper airway cough syndrome, GERD, allergies  -continue PPI  -plan for allergies under seasonal/environmental allergies

## 2020-08-26 NOTE — ASSESSMENT & PLAN NOTE
Recently discontinued supplemental oxygen  Asthma may be worsening secondary to COVID-19 pneumonia  Treat cough and continue asthma controlling medications    Continue to monitor

## 2020-09-16 ENCOUNTER — OFFICE VISIT (OUTPATIENT)
Dept: OBGYN CLINIC | Facility: MEDICAL CENTER | Age: 45
End: 2020-09-16
Payer: COMMERCIAL

## 2020-09-16 VITALS
HEART RATE: 76 BPM | HEIGHT: 65 IN | BODY MASS INDEX: 41.15 KG/M2 | WEIGHT: 247 LBS | SYSTOLIC BLOOD PRESSURE: 117 MMHG | DIASTOLIC BLOOD PRESSURE: 75 MMHG | TEMPERATURE: 97.4 F

## 2020-09-16 DIAGNOSIS — M18.0 ARTHRITIS OF CARPOMETACARPAL (CMC) JOINT OF BOTH THUMBS: Primary | ICD-10-CM

## 2020-09-16 DIAGNOSIS — G56.03 BILATERAL CARPAL TUNNEL SYNDROME: ICD-10-CM

## 2020-09-16 DIAGNOSIS — M79.642 PAIN IN BOTH HANDS: ICD-10-CM

## 2020-09-16 DIAGNOSIS — M79.641 PAIN IN BOTH HANDS: ICD-10-CM

## 2020-09-16 PROCEDURE — 99244 OFF/OP CNSLTJ NEW/EST MOD 40: CPT | Performed by: ORTHOPAEDIC SURGERY

## 2020-09-16 PROCEDURE — 1036F TOBACCO NON-USER: CPT | Performed by: ORTHOPAEDIC SURGERY

## 2020-09-16 RX ORDER — MELOXICAM 15 MG/1
15 TABLET ORAL DAILY
Qty: 30 TABLET | Refills: 1 | Status: SHIPPED | OUTPATIENT
Start: 2020-09-16 | End: 2021-05-20

## 2020-09-16 NOTE — LETTER
September 16, 2020     [address] [place] [address]  [address] [employer]    Patient: Jake Su   YOB: 1975   Date of Visit: 9/16/2020       Dear Dr Ivan Lockett: Thank you for referring Jake Su to me for evaluation  Below are my notes for this consultation  If you have questions, please do not hesitate to call me  I look forward to following your patient along with you  Sincerely,        Martine Lacy MD        CC: No Recipients  Martine Lacy MD  9/16/2020  7:48 PM  Sign when Signing Visit  Chief Complaint     Bilateral hand pain, numbness and tingling      History of Present Illness     Jake Su is a 50 y o  RHD female who presents with bilateral hand pain, numbness, and tingling  We are seeing her in consultation after referral by [address] [place] [address]  She notes a 3 month history of symptoms, left greater than right, with atraumatic onset  She notes pain in her thenar eminence and thumb CMC joint with numbness in the thumb and index finger  Symptoms wake her up at night, and she is dropping things more frequently  She is currently not working  No catching, locking, clicking, or popping  Has not tried bracing medications or injections      Past Medical History:   Diagnosis Date    Anemia     Anxiety     Arthritis     hands, knee    Asthma     uses inhalers for the same   No recnt flairups    Back pain     Carpal tunnel syndrome     Bilateral    Constipation     Depression     Environmental allergies     GERD (gastroesophageal reflux disease)     H/O cardiac murmur     Insomnia     Kidney stone     Migraine     Psychiatric disorder     anxiety    Pyelonephritis     Wears glasses        Past Surgical History:   Procedure Laterality Date    BACK SURGERY      States she isn't sure what she had done-possibly something to do with a lump or muscle    CHOLECYSTECTOMY      CYSTOSCOPY      onset: 6/1/16, resolved: 6/1/16    ESOPHAGOGASTRODUODENOSCOPY      VA LIGATN LONG SAPHENOUS VEIN AT [facility] Left 12/22/2016    Procedure: LIGATION/STRIPPING VEIN, LIGATION OF ANTERIOR TRIBUTARY GREATER SAPHENOUS VEIN BRANCH;  Surgeon: Wyatt Hernandez DO;  [facility];  Service: Vascular    VA PHLEB VEINS - EXTREM - TO 20 Left 12/22/2016    Procedure: MULTIPLE STAB PHLEBECTOMIES;  Surgeon: Wyatt Hernandez DO;  [facility];  Service: Vascular    TUBAL LIGATION      VARICOSE VEIN SURGERY Left     Left leg    VEIN LIGATION Right 6/5/2017    Procedure: LIGATION GREATER SAPHENOUS VEIN TRIBUTARY WITH STAB PHLEBECTOMIES ;  Surgeon: Wyatt Hernandez DO;  [facility];  Service:        No Known Allergies    Current Outpatient Medications on File Prior to Visit   Medication Sig Dispense Refill    ADVAIR HFA 45-21 MCG/ACT inhaler INHALE 2 PUFFS TWICE A DAY RINSE MOUTH AFTER USE 12 Inhaler 1    albuterol (2 5 mg/3 mL) 0 083 % nebulizer solution Take 1 vial (2 5 mg total) by nebulization every 6 (six) hours as needed for wheezing or shortness of breath 10 vial 1    azelastine (ASTELIN) 0 1 % nasal spray 1 spray into each nostril 2 (two) times a day Use in each nostril as directed 1 Bottle 1    benzonatate (TESSALON) 200 MG capsule Take 1 capsule (200 mg total) by mouth 3 (three) times a day as needed for cough 20 capsule 0    buPROPion (WELLBUTRIN SR) 200 MG 12 hr tablet       Cholecalciferol (VITAMIN D) 50 MCG (2000 UT) tablet Take 1 tablet (2,000 Units total) by mouth daily 90 tablet 1    clonazePAM (KlonoPIN) 1 mg tablet Take 1 tablet (1 mg total) by mouth 2 (two) times a day 45 tablet 0    ergocalciferol (VITAMIN D2) 50,000 units Take 1 capsule (50,000 Units total) by mouth once a week 4 capsule 2    fexofenadine (ALLEGRA) 60 MG tablet Take 1 tablet (60 mg total) by mouth daily 30 tablet 1    fluticasone (FLONASE) 50 mcg/act nasal spray SPRAY 1 SPRAY INTO EACH NOSTRIL EVERY DAY 16 mL 2    gabapentin (NEURONTIN) 600 MG tablet Take 1 tablet (600 mg total) by mouth 3 (three) times a day 90 tablet 3    hydrochlorothiazide (HYDRODIURIL) 25 mg tablet Take 1 tablet (25 mg total) by mouth daily 90 tablet 1    methocarbamol (ROBAXIN) 750 mg tablet Take 1 tablet (750 mg total) by mouth every 8 (eight) hours 90 tablet 3    Misc   Devices (PULSE OXIMETER) MISC by Does not apply route daily 1 each 0    nystatin (MYCOSTATIN) 500,000 units/5 mL suspension Apply 5 mL (500,000 Units total) to the mouth or throat 4 (four) times a day 100 mL 0    omeprazole (PriLOSEC) 40 MG capsule Take 1 capsule (40 mg total) by mouth daily before breakfast 90 capsule 3    traMADol (ULTRAM) 50 mg tablet Take 50 mg by mouth every 6 (six) hours as needed for moderate pain      albuterol (PROVENTIL HFA,VENTOLIN HFA) 90 mcg/act inhaler INHALE 2 PUFFS BY MOUTH 4 TIMES A DAY (Patient not taking: Reported on 9/16/2020) 18 Inhaler 1    diazepam (VALIUM) 2 mg tablet Take 2 mg by mouth 4 (four) times daily (after meals and at bedtime)      diclofenac (VOLTAREN) 75 mg EC tablet TAKE 1 TABLET BY MOUTH TWICE A DAY (Patient not taking: Reported on 9/16/2020) 60 tablet 1    ergocalciferol (VITAMIN D2) 50,000 units Take 1 capsule (50,000 Units total) by mouth once a week (Patient not taking: Reported on 1/16/2020) 4 capsule 2    phentermine 37 5 MG capsule Take 1 capsule (37 5 mg total) by mouth every morning (Patient not taking: Reported on 9/16/2020) 30 capsule 1    simvastatin (ZOCOR) 40 mg tablet TAKE 1 TABLET BY MOUTH DAILY AT BEDTIME (Patient not taking: Reported on 1/10/2020) 30 tablet 2    TRAZODONE HCL PO Take 100 mg by mouth daily at bedtime      zolpidem (AMBIEN) 10 mg tablet Take 1 tablet (10 mg total) by mouth daily at bedtime (Patient not taking: Reported on 8/10/2020) 30 tablet 0    [DISCONTINUED] fluticasone (FLONASE) 50 mcg/act nasal spray 2 sprays into each nostril daily 1 Bottle 0     No current facility-administered medications on file prior to visit  Social History     Tobacco Use    Smoking status: Former Smoker     Packs/day: 0 50     Years: 10 00     Pack years: 5 00     Types: Cigarettes     Last attempt to quit: 2019     Years since quittin 1    Smokeless tobacco: Never Used   Substance Use Topics    Alcohol use: Yes     Frequency: 2-3 times a week     Drinks per session: 7 to 9     Binge frequency: Weekly    Drug use: Never       Family History   Problem Relation Age of Onset    Colon cancer Mother         ascending    Asthma Mother     Coronary artery disease Mother     Depression Mother     Diabetes Mother     Hypertension Mother     Lung cancer Mother     Breast cancer Mother     Varicose Veins Mother     Asthma Father     Hypertension Father     Lung cancer Father        Review of Systems     As stated in the HPI  All other systems were reviewed and are negative  Physical Exam     /75   Pulse 76   Temp (!) 97 4 °F (36 3 °C)   Ht 5' 5" (1 651 m)   Wt 112 kg (247 lb)   BMI 41 10 kg/m²     GENERAL: This is a well-developed, well-nourished, age-appropriate patient in no acute distress  The patient is alert and oriented x3  Pleasant and cooperative  Eyes: Anicteric sclerae  Extraocular movements appear intact  HENT: Nares are patent with no drainage  Lungs: There is equal chest rise on inspection  Breathing is non-labored with no audible wheezing  Cardiovascular: No cyanosis  No upper extremity lymphadema  Skin: Skin is warm to touch  No obvious skin lesions or rashes other than described below  Neurologic: No ataxia  Psychiatric: Mood and affect are appropriate      Cervical Spine:   No TTP  Negative Llhermites and Spurling's    Bilateral Upper Extremity:  Negative Tinel's at Cubital Tunnel and Guyon's Canal, Negative Flexion Compression at Elbow  Positive Tinel's at Carpal Tunnel and Positive Durkan's  TTP over Thumb CMC joint with positive axial grind test  Positive Thumb Adduction Test  Negative Rudyhoff and Finkelstein  5/5 Motor to the APB, FDI, FDP2, FDP5, EDC  Sensation intact to light touch in the median, radial, and ulnar nerve distribution  Fingers WWP    Data Review     Results Reviewed     None             Imaging:  No new imaging    Assessment and Plan      Diagnoses and all orders for this visit:    Arthritis of carpometacarpal (CMC) joint of both thumbs    Pain in both hands  -     Ambulatory referral to Orthopedic Surgery    Bilateral carpal tunnel syndrome  -     Ambulatory referral to Orthopedic Surgery         68-year-old female with bilateral thumb CMC arthritis and carpal tunnel syndrome  We will pursue non-operative treatment using night time splints for the CTS, comfort cool braces during the day for [facility] arthritis, and Voltaren gel/NSAIDs for help with symptom control for both  Braces were given today for nighttime use    We discussed the etiology and natural course of carpal tunnel syndrome  We discussed that carpal tunnel syndrome is related to increased pressure on the nerve in the carpal canal at the level of the wrist   Increasing pressure can be a result of wrist flexion or extension or changes to the contents of the carpal tunnel  We discussed that progression of this condition from mild to severe can result in numbness and tingling as well as dysfunction of the hand and even atrophy and weakness to the thumb musculature  Treatment options for this condition range from nonoperative to operative and the mainstays are nighttime splinting for nonoperative measures and carpal tunnel release for operative measures  Trial of nonoperative measures for around 6 weeks is typically beneficial prior to any surgical intervention and can help to avoid surgery    Surgical release is performed with a mini open approach and while it can be performed with local only or local and sedation, there are risks to the procedure that include bleeding, infection, damage to surrounding neurovascular structures, weakness, pillar pain, and persistence of symptoms  I also discussed with the patient that if carpal tunnel persists in both wrists that surgical intervention can be performed simultaneously and that recovery is expedited  I have discussed the natural history of thumb carpometacarpal arthritis as well as the treatment options  We discussed that arthritis in the thumb can be treated similarly to how arthritis is treated in many parts of the body with conservative management and then surgical intervention if the nonsurgical options do not succeeded bringing sustained pain relief and good function  The nonsurgical options include oral anti-inflammatory medication, braces, hand therapy, and injectable medications such as corticosteroid  These can all be trialed and repeated multiple times  We have also discussed the surgical management of thumb carpometacarpal arthritis which include thumb carpometacarpal joint fusion or arthroplasty using LRTI or suspensionplasty techniques          Follow Up: 6 weeks    To Do Next Visit: Repeat clinical exam    PROCEDURES PERFORMED:  No Procedures performed today

## 2020-09-16 NOTE — PROGRESS NOTES
Chief Complaint     Bilateral hand pain, numbness and tingling      History of Present Illness     Angle Newsome is a 50 y o  RHD female who presents with bilateral hand pain, numbness, and tingling  We are seeing her in consultation after referral by Milla Myers  She notes a 3 month history of symptoms, left greater than right, with atraumatic onset  She notes pain in her thenar eminence and thumb CMC joint with numbness in the thumb and index finger  Symptoms wake her up at night, and she is dropping things more frequently  She is currently not working  No catching, locking, clicking, or popping  Has not tried bracing medications or injections      Past Medical History:   Diagnosis Date    Anemia     Anxiety     Arthritis     hands, knee    Asthma     uses inhalers for the same   No recnt flairups    Back pain     Carpal tunnel syndrome     Bilateral    Constipation     Depression     Environmental allergies     GERD (gastroesophageal reflux disease)     H/O cardiac murmur     Insomnia     Kidney stone     Migraine     Psychiatric disorder     anxiety    Pyelonephritis     Wears glasses        Past Surgical History:   Procedure Laterality Date    BACK SURGERY      States she isn't sure what she had done-possibly something to do with a lump or muscle    CHOLECYSTECTOMY      CYSTOSCOPY      onset: 6/1/16, resolved: 6/1/16    ESOPHAGOGASTRODUODENOSCOPY      MA LIGATN LONG SAPHENOUS VEIN AT [facility] Left 12/22/2016    Procedure: LIGATION/STRIPPING VEIN, LIGATION OF ANTERIOR TRIBUTARY GREATER SAPHENOUS VEIN BRANCH;  Surgeon: Russ Ballesteros DO;  [facility];  Service: Vascular    MA PHLEB VEINS - [facility] - TO 20 Left 12/22/2016    Procedure: MULTIPLE STAB PHLEBECTOMIES;  Surgeon: Russ Ballesteros DO;  [facility];  Service: Vascular    TUBAL LIGATION      VARICOSE VEIN SURGERY Left     Left leg    VEIN LIGATION Right 6/5/2017    Procedure: LIGATION GREATER SAPHENOUS VEIN TRIBUTARY WITH STAB PHLEBECTOMIES ;  Surgeon: Stephanie Dooley DO;  [facility];  Service:        No Known Allergies    Current Outpatient Medications on File Prior to Visit   Medication Sig Dispense Refill    ADVAIR HFA 45-21 MCG/ACT inhaler INHALE 2 PUFFS TWICE A DAY RINSE MOUTH AFTER USE 12 Inhaler 1    albuterol (2 5 mg/3 mL) 0 083 % nebulizer solution Take 1 vial (2 5 mg total) by nebulization every 6 (six) hours as needed for wheezing or shortness of breath 10 vial 1    azelastine (ASTELIN) 0 1 % nasal spray 1 spray into each nostril 2 (two) times a day Use in each nostril as directed 1 Bottle 1    benzonatate (TESSALON) 200 MG capsule Take 1 capsule (200 mg total) by mouth 3 (three) times a day as needed for cough 20 capsule 0    buPROPion (WELLBUTRIN SR) 200 MG 12 hr tablet       Cholecalciferol (VITAMIN D) 50 MCG (2000 UT) tablet Take 1 tablet (2,000 Units total) by mouth daily 90 tablet 1    clonazePAM (KlonoPIN) 1 mg tablet Take 1 tablet (1 mg total) by mouth 2 (two) times a day 45 tablet 0    ergocalciferol (VITAMIN D2) 50,000 units Take 1 capsule (50,000 Units total) by mouth once a week 4 capsule 2    fexofenadine (ALLEGRA) 60 MG tablet Take 1 tablet (60 mg total) by mouth daily 30 tablet 1    fluticasone (FLONASE) 50 mcg/act nasal spray SPRAY 1 SPRAY INTO EACH NOSTRIL EVERY DAY 16 mL 2    gabapentin (NEURONTIN) 600 MG tablet Take 1 tablet (600 mg total) by mouth 3 (three) times a day 90 tablet 3    hydrochlorothiazide (HYDRODIURIL) 25 mg tablet Take 1 tablet (25 mg total) by mouth daily 90 tablet 1    methocarbamol (ROBAXIN) 750 mg tablet Take 1 tablet (750 mg total) by mouth every 8 (eight) hours 90 tablet 3    Misc   Devices (PULSE OXIMETER) MISC by Does not apply route daily 1 each 0    nystatin (MYCOSTATIN) 500,000 units/5 mL suspension Apply 5 mL (500,000 Units total) to the mouth or throat 4 (four) times a day 100 mL 0    omeprazole (PriLOSEC) 40 MG capsule Take 1 capsule (40 mg total) by mouth daily before breakfast 90 capsule 3    traMADol (ULTRAM) 50 mg tablet Take 50 mg by mouth every 6 (six) hours as needed for moderate pain      albuterol (PROVENTIL HFA,VENTOLIN HFA) 90 mcg/act inhaler INHALE 2 PUFFS BY MOUTH 4 TIMES A DAY (Patient not taking: Reported on 2020) 18 Inhaler 1    diazepam (VALIUM) 2 mg tablet Take 2 mg by mouth 4 (four) times daily (after meals and at bedtime)      diclofenac (VOLTAREN) 75 mg EC tablet TAKE 1 TABLET BY MOUTH TWICE A DAY (Patient not taking: Reported on 2020) 60 tablet 1    ergocalciferol (VITAMIN D2) 50,000 units Take 1 capsule (50,000 Units total) by mouth once a week (Patient not taking: Reported on 2020) 4 capsule 2    phentermine 37 5 MG capsule Take 1 capsule (37 5 mg total) by mouth every morning (Patient not taking: Reported on 2020) 30 capsule 1    simvastatin (ZOCOR) 40 mg tablet TAKE 1 TABLET BY MOUTH DAILY AT BEDTIME (Patient not taking: Reported on 1/10/2020) 30 tablet 2    TRAZODONE HCL PO Take 100 mg by mouth daily at bedtime      zolpidem (AMBIEN) 10 mg tablet Take 1 tablet (10 mg total) by mouth daily at bedtime (Patient not taking: Reported on 8/10/2020) 30 tablet 0    [DISCONTINUED] fluticasone (FLONASE) 50 mcg/act nasal spray 2 sprays into each nostril daily 1 Bottle 0     No current facility-administered medications on file prior to visit          Social History     Tobacco Use    Smoking status: Former Smoker     Packs/day: 0 50     Years: 10 00     Pack years: 5 00     Types: Cigarettes     Last attempt to quit: 2019     Years since quittin 1    Smokeless tobacco: Never Used   Substance Use Topics    Alcohol use: Yes     Frequency: 2-3 times a week     Drinks per session: 7 to 9     Binge frequency: Weekly    Drug use: Never       Family History   Problem Relation Age of Onset    Colon cancer Mother         ascending    Asthma Mother     Coronary artery disease Mother     Depression Mother     Diabetes Mother     Hypertension Mother     Lung cancer Mother     Breast cancer Mother     Varicose Veins Mother     Asthma Father     Hypertension Father     Lung cancer Father        Review of Systems     As stated in the HPI  All other systems were reviewed and are negative  Physical Exam     /75   Pulse 76   Temp (!) 97 4 °F (36 3 °C)   Ht 5' 5" (1 651 m)   Wt 112 kg (247 lb)   BMI 41 10 kg/m²     GENERAL: This is a well-developed, well-nourished, age-appropriate patient in no acute distress  The patient is alert and oriented x3  Pleasant and cooperative  Eyes: Anicteric sclerae  Extraocular movements appear intact  HENT: Nares are patent with no drainage  Lungs: There is equal chest rise on inspection  Breathing is non-labored with no audible wheezing  Cardiovascular: No cyanosis  No upper extremity lymphadema  Skin: Skin is warm to touch  No obvious skin lesions or rashes other than described below  Neurologic: No ataxia  Psychiatric: Mood and affect are appropriate  Cervical Spine:   No TTP  Negative Llhermites and Spurling's    Bilateral Upper Extremity:  Negative Tinel's at Cubital Tunnel and Guyon's Canal, Negative Flexion Compression at Elbow  Positive Tinel's at Carpal Tunnel and Positive Durkan's  TTP over Thumb CMC joint with positive axial grind test  Positive Thumb Adduction Test  Negative Eichoff and Finkelstein  5/5 Motor to the APB, FDI, FDP2, FDP5, EDC  Sensation intact to light touch in the median, radial, and ulnar nerve distribution    Fingers WWP    Data Review     Results Reviewed     None             Imaging:  No new imaging    Assessment and Plan      Diagnoses and all orders for this visit:    Arthritis of carpometacarpal (CMC) joint of both thumbs    Pain in both hands  -     Ambulatory referral to Orthopedic Surgery    Bilateral carpal tunnel syndrome  -     Ambulatory referral to Orthopedic Surgery 57-year-old female with bilateral thumb CMC arthritis and carpal tunnel syndrome  We will pursue non-operative treatment using night time splints for the CTS, comfort cool braces during the day for [facility] arthritis, and Voltaren gel/NSAIDs for help with symptom control for both  Braces were given today for nighttime use    We discussed the etiology and natural course of carpal tunnel syndrome  We discussed that carpal tunnel syndrome is related to increased pressure on the nerve in the carpal canal at the level of the wrist   Increasing pressure can be a result of wrist flexion or extension or changes to the contents of the carpal tunnel  We discussed that progression of this condition from mild to severe can result in numbness and tingling as well as dysfunction of the hand and even atrophy and weakness to the thumb musculature  Treatment options for this condition range from nonoperative to operative and the mainstays are nighttime splinting for nonoperative measures and carpal tunnel release for operative measures  Trial of nonoperative measures for around 6 weeks is typically beneficial prior to any surgical intervention and can help to avoid surgery  Surgical release is performed with a mini open approach and while it can be performed with local only or local and sedation, there are risks to the procedure that include bleeding, infection, damage to surrounding neurovascular structures, weakness, pillar pain, and persistence of symptoms  I also discussed with the patient that if carpal tunnel persists in both wrists that surgical intervention can be performed simultaneously and that recovery is expedited  I have discussed the natural history of thumb carpometacarpal arthritis as well as the treatment options   We discussed that arthritis in the thumb can be treated similarly to how arthritis is treated in many parts of the body with conservative management and then surgical intervention if the nonsurgical options do not succeeded bringing sustained pain relief and good function  The nonsurgical options include oral anti-inflammatory medication, braces, hand therapy, and injectable medications such as corticosteroid  These can all be trialed and repeated multiple times  We have also discussed the surgical management of thumb carpometacarpal arthritis which include thumb carpometacarpal joint fusion or arthroplasty using LRTI or suspensionplasty techniques          Follow Up: 6 weeks    To Do Next Visit: Repeat clinical exam    PROCEDURES PERFORMED:  No Procedures performed today

## 2020-10-28 ENCOUNTER — OFFICE VISIT (OUTPATIENT)
Dept: SLEEP CENTER | Facility: CLINIC | Age: 45
End: 2020-10-28
Payer: COMMERCIAL

## 2020-10-28 ENCOUNTER — LAB (OUTPATIENT)
Dept: LAB | Facility: HOSPITAL | Age: 45
End: 2020-10-28
Attending: INTERNAL MEDICINE
Payer: COMMERCIAL

## 2020-10-28 ENCOUNTER — TRANSCRIBE ORDERS (OUTPATIENT)
Dept: SLEEP CENTER | Facility: CLINIC | Age: 45
End: 2020-10-28

## 2020-10-28 VITALS
OXYGEN SATURATION: 99 % | HEIGHT: 65 IN | HEART RATE: 76 BPM | TEMPERATURE: 96.5 F | DIASTOLIC BLOOD PRESSURE: 76 MMHG | SYSTOLIC BLOOD PRESSURE: 116 MMHG | WEIGHT: 247.8 LBS | BODY MASS INDEX: 41.29 KG/M2

## 2020-10-28 DIAGNOSIS — G47.00 INSOMNIA, UNSPECIFIED TYPE: ICD-10-CM

## 2020-10-28 DIAGNOSIS — G47.21 DELAYED SLEEP PHASE SYNDROME: ICD-10-CM

## 2020-10-28 DIAGNOSIS — G25.81 RESTLESS LEG SYNDROME: Primary | ICD-10-CM

## 2020-10-28 DIAGNOSIS — G25.81 RESTLESS LEG SYNDROME: ICD-10-CM

## 2020-10-28 DIAGNOSIS — R06.81 APNEA: ICD-10-CM

## 2020-10-28 LAB
FERRITIN SERPL-MCNC: 307 NG/ML (ref 8–388)
IRON SERPL-MCNC: 75 UG/DL (ref 50–170)
MAGNESIUM SERPL-MCNC: 1.9 MG/DL (ref 1.6–2.6)
TIBC SERPL-MCNC: 323 UG/DL (ref 250–450)

## 2020-10-28 PROCEDURE — 82728 ASSAY OF FERRITIN: CPT

## 2020-10-28 PROCEDURE — 99244 OFF/OP CNSLTJ NEW/EST MOD 40: CPT | Performed by: INTERNAL MEDICINE

## 2020-10-28 PROCEDURE — 36415 COLL VENOUS BLD VENIPUNCTURE: CPT

## 2020-10-28 PROCEDURE — 83540 ASSAY OF IRON: CPT

## 2020-10-28 PROCEDURE — 3008F BODY MASS INDEX DOCD: CPT | Performed by: INTERNAL MEDICINE

## 2020-10-28 PROCEDURE — 83735 ASSAY OF MAGNESIUM: CPT

## 2020-10-28 PROCEDURE — 83550 IRON BINDING TEST: CPT

## 2020-11-03 ENCOUNTER — HOSPITAL ENCOUNTER (OUTPATIENT)
Dept: SLEEP CENTER | Facility: CLINIC | Age: 45
Discharge: HOME | End: 2020-11-03
Payer: COMMERCIAL

## 2020-11-03 DIAGNOSIS — R06.81 APNEA: ICD-10-CM

## 2020-11-03 DIAGNOSIS — G25.81 RESTLESS LEG SYNDROME: ICD-10-CM

## 2020-11-03 PROCEDURE — 95810 POLYSOM 6/> YRS 4/> PARAM: CPT | Performed by: INTERNAL MEDICINE

## 2020-11-03 PROCEDURE — 95810 POLYSOM 6/> YRS 4/> PARAM: CPT

## 2020-11-04 ENCOUNTER — OFFICE VISIT (OUTPATIENT)
Dept: PULMONOLOGY | Facility: CLINIC | Age: 45
End: 2020-11-04
Payer: COMMERCIAL

## 2020-11-04 VITALS
DIASTOLIC BLOOD PRESSURE: 80 MMHG | OXYGEN SATURATION: 98 % | RESPIRATION RATE: 16 BRPM | BODY MASS INDEX: 41.1 KG/M2 | WEIGHT: 247 LBS | SYSTOLIC BLOOD PRESSURE: 130 MMHG | HEART RATE: 76 BPM

## 2020-11-04 DIAGNOSIS — E66.01 MORBID OBESITY (HCC): ICD-10-CM

## 2020-11-04 DIAGNOSIS — J30.89 ENVIRONMENTAL AND SEASONAL ALLERGIES: ICD-10-CM

## 2020-11-04 DIAGNOSIS — J45.40 MODERATE PERSISTENT ASTHMA WITHOUT COMPLICATION: ICD-10-CM

## 2020-11-04 DIAGNOSIS — R05.9 COUGH: Primary | ICD-10-CM

## 2020-11-04 DIAGNOSIS — K21.9 GASTROESOPHAGEAL REFLUX DISEASE, UNSPECIFIED WHETHER ESOPHAGITIS PRESENT: ICD-10-CM

## 2020-11-04 DIAGNOSIS — G47.33 OSA (OBSTRUCTIVE SLEEP APNEA): ICD-10-CM

## 2020-11-04 PROCEDURE — 99214 OFFICE O/P EST MOD 30 MIN: CPT | Performed by: INTERNAL MEDICINE

## 2020-11-04 PROCEDURE — 1036F TOBACCO NON-USER: CPT | Performed by: INTERNAL MEDICINE

## 2020-11-04 RX ORDER — BENZONATATE 200 MG/1
200 CAPSULE ORAL 3 TIMES DAILY PRN
Qty: 20 CAPSULE | Refills: 3 | Status: SHIPPED | OUTPATIENT
Start: 2020-11-04 | End: 2021-05-05 | Stop reason: SDUPTHER

## 2020-11-04 RX ORDER — FLUTICASONE PROPIONATE 50 MCG
1 SPRAY, SUSPENSION (ML) NASAL DAILY
Qty: 16 ML | Refills: 3 | Status: SHIPPED | OUTPATIENT
Start: 2020-11-04 | End: 2021-06-12

## 2020-11-04 RX ORDER — FLUTICASONE PROPIONATE AND SALMETEROL XINAFOATE 45; 21 UG/1; UG/1
2 AEROSOL, METERED RESPIRATORY (INHALATION) 2 TIMES DAILY
Qty: 1 INHALER | Refills: 3 | Status: SHIPPED | OUTPATIENT
Start: 2020-11-04 | End: 2021-12-28

## 2020-11-04 RX ORDER — FEXOFENADINE HYDROCHLORIDE 60 MG/1
60 TABLET, FILM COATED ORAL DAILY
Qty: 30 TABLET | Refills: 1 | Status: SHIPPED | OUTPATIENT
Start: 2020-11-04 | End: 2022-05-18 | Stop reason: SDUPTHER

## 2020-11-04 RX ORDER — ALBUTEROL SULFATE 90 UG/1
1 AEROSOL, METERED RESPIRATORY (INHALATION) EVERY 6 HOURS PRN
Qty: 18 INHALER | Refills: 3 | Status: SHIPPED | OUTPATIENT
Start: 2020-11-04 | End: 2021-05-05 | Stop reason: SDUPTHER

## 2020-11-04 RX ORDER — AZELASTINE 1 MG/ML
1 SPRAY, METERED NASAL 2 TIMES DAILY
Qty: 1 BOTTLE | Refills: 1 | Status: SHIPPED | OUTPATIENT
Start: 2020-11-04

## 2020-11-05 DIAGNOSIS — G47.33 OSA (OBSTRUCTIVE SLEEP APNEA): Primary | ICD-10-CM

## 2020-11-12 ENCOUNTER — TELEPHONE (OUTPATIENT)
Dept: SLEEP CENTER | Facility: CLINIC | Age: 45
End: 2020-11-12

## 2020-11-23 ENCOUNTER — HOSPITAL ENCOUNTER (OUTPATIENT)
Dept: PULMONOLOGY | Facility: HOSPITAL | Age: 45
Discharge: HOME | End: 2020-11-23
Payer: COMMERCIAL

## 2020-11-23 PROCEDURE — 94726 PLETHYSMOGRAPHY LUNG VOLUMES: CPT

## 2020-11-23 PROCEDURE — 94010 BREATHING CAPACITY TEST: CPT

## 2020-11-23 PROCEDURE — 94729 DIFFUSING CAPACITY: CPT | Performed by: INTERNAL MEDICINE

## 2020-11-23 PROCEDURE — 94729 DIFFUSING CAPACITY: CPT

## 2020-11-23 PROCEDURE — 94726 PLETHYSMOGRAPHY LUNG VOLUMES: CPT | Performed by: INTERNAL MEDICINE

## 2020-11-23 PROCEDURE — 94010 BREATHING CAPACITY TEST: CPT | Performed by: INTERNAL MEDICINE

## 2020-11-23 PROCEDURE — 94760 N-INVAS EAR/PLS OXIMETRY 1: CPT

## 2020-12-08 ENCOUNTER — TELEPHONE (OUTPATIENT)
Dept: SLEEP CENTER | Facility: CLINIC | Age: 45
End: 2020-12-08

## 2020-12-18 DIAGNOSIS — M54.42 CHRONIC BILATERAL LOW BACK PAIN WITH BILATERAL SCIATICA: ICD-10-CM

## 2020-12-18 DIAGNOSIS — M54.41 CHRONIC BILATERAL LOW BACK PAIN WITH BILATERAL SCIATICA: ICD-10-CM

## 2020-12-18 DIAGNOSIS — G89.29 CHRONIC BILATERAL LOW BACK PAIN WITH BILATERAL SCIATICA: ICD-10-CM

## 2020-12-18 RX ORDER — GABAPENTIN 600 MG/1
TABLET ORAL
Qty: 90 TABLET | Refills: 3 | Status: SHIPPED | OUTPATIENT
Start: 2020-12-18 | End: 2021-12-28

## 2021-01-05 ENCOUNTER — TELEPHONE (OUTPATIENT)
Dept: FAMILY MEDICINE CLINIC | Facility: CLINIC | Age: 46
End: 2021-01-05

## 2021-01-05 NOTE — TELEPHONE ENCOUNTER
Via nurse line:    Pt called stating she wanted to speak to PCP, about a dx they gave her  I called pt NA, unable to LVM  recording "custmer you are trying to reach no katlyn   At this time"

## 2021-01-11 ENCOUNTER — TELEPHONE (OUTPATIENT)
Dept: SLEEP CENTER | Facility: CLINIC | Age: 46
End: 2021-01-11

## 2021-01-11 NOTE — TELEPHONE ENCOUNTER
1/11/21 8:40am Pt is calling to request a new mask,right now using full mask, want the nasal mask,pt states she has asthma,hard to breathe    Please call her back at [phone] TA

## 2021-05-04 NOTE — PROGRESS NOTES
Pulmonary Follow Up Note   Mendel Beal 48 y o  female MRN: [id number]  5/4/2021      Assessment/Plan:    1  Cough  -Multifactorial:  Postnasal drip/allergies and GERD/diet  Also possibly underlying asthma but asthma well controlled  previous medical noncompliance with allergy medications and PPI  -symptoms markedly improved  -encourage patient to decrease caffeine intake including coffee and chocolate  -advised patient for continued compliance with Allegra, asilastin nasal spray, Flonase  -advised patient compliance with PPI  -advised lifestyle modifications including chewing food well and to not lie down post eating and to not to eat close to bedtime and to sleep with head of bed elevated  - Refill Tessalon Perles        2  Environmental and seasonal allergies  -well controlled and symptoms improved  -Continue Allegra, azelastin, Flonase  -[facility] allergy panel to be obtained        3  Moderate persistent asthma   -Well controlled  -continue Advair and albuterol  - refill albuterol  -overall normal pulmonary function testing with mild restriction and mildly decreased vital capacity     4  Gastroesophageal reflux disease  -stable  -continue omeprazole     5  TERESA (obstructive sleep apnea)  -mild obstructive sleep apnea (AHI 9 3)/REM AHI 23 9  -CPAP with auto titration 5-20 cm H2O     6  Morbid obesity   -Encouraged patient to decrease caloric intake and exercise approximately 150 minutes per week  -encourage patient to set goal to lose 5-10 lb lb every 2-3 months  -patient has lost 17 lb since last visit    * of note patient does have elevated MCV without anemia  Advised patient to discuss with primary care provider regarding MCV  Obtained repeat CBC with differential*    No follow-ups on file      History of Present Illness   HPI:  Mendel Beal is a 48 y o  female with seasonal allergies, moderate persistent cough, GERD, chronic pain presenting for evaluation of chronic pulmonary related conditions  Patient reports that she is doing well  Reports that her asthma is well controlled  She has not had any exacerbations requiring excessive albuterol use  She is compliant with her Advair and albuterol  She seldom uses her albuterol rescue inhaler  She relates that her cough has dramatically improved since last visit  She did have a few coughing spells last week and  Last night  She states that at that time she the pollen interferes with her symptoms and last night she was exposed to a lot of smoke from barbecuing  She is compliant with her  Astelin, Flonase, Allegra without any side effects  She also uses her PPI daily  She states that she has significant relief with PPI use  She reports without PPI use her acid reflux is significantly distressing  Occupational History:  Homemaker  Lives in an apartment with , children, daughter-in-law, 2 grandchildren      Social History:  Reports smoking for 2 years as a teenager  Ten cigarettes a day  Denies any marijuana use or recreational drug use  No alcohol use      Malignancy History:  Father has lung cancer from smoking     Pets/animal exposure:  Denies any exposure to birds  Has pet fish     Inhalers: Albuterol, Advair, Flovent    Review of systems:  12 point review of systems was completed and was otherwise negative except as listed in HPI  Historical Information   Past Medical History:   Diagnosis Date    Anemia     Anxiety     Arthritis     hands, knee    Asthma     uses inhalers for the same   No recnt flairups    Back pain     Carpal tunnel syndrome     Bilateral    Constipation     Depression     Environmental allergies     GERD (gastroesophageal reflux disease)     H/O cardiac murmur     Insomnia     Kidney stone     Migraine     Psychiatric disorder     anxiety    Pyelonephritis     Wears glasses      Past Surgical History:   Procedure Laterality Date    BACK SURGERY      States she isn't sure what she had done-possibly something to do with a lump or muscle    CHOLECYSTECTOMY      CYSTOSCOPY      onset: 6/1/16, resolved: 6/1/16    ESOPHAGOGASTRODUODENOSCOPY      DC LIGATN LONG SAPHENOUS VEIN AT SEPH-FEM JUN Left 12/22/2016    Procedure: LIGATION/STRIPPING VEIN, LIGATION OF ANTERIOR TRIBUTARY GREATER SAPHENOUS VEIN BRANCH;  Surgeon: Viet Mcnamara DO;  Location: [facility];  Service: Vascular    DC PHLEB VEINS - EXTREM - TO 20 Left 12/22/2016    Procedure: MULTIPLE STAB PHLEBECTOMIES;  Surgeon: Viet Mcnamara DO;  Location: [facility];  Service: Vascular    TUBAL LIGATION      VARICOSE VEIN SURGERY Left     Left leg    VEIN LIGATION Right 6/5/2017    Procedure: LIGATION GREATER SAPHENOUS VEIN TRIBUTARY WITH STAB PHLEBECTOMIES ;  Surgeon: Viet Mcnamara DO;  Location: [facility];  Service:      Family History   Problem Relation Age of Onset    Colon cancer Mother         ascending    Asthma Mother     Coronary artery disease Mother     Depression Mother     Diabetes Mother     Hypertension Mother     Lung cancer Mother    Elizabeth Pall Breast cancer Mother     Varicose Veins Mother     Asthma Father     Hypertension Father     Lung cancer Father          Meds/Allergies     Current Outpatient Medications:     albuterol (2 5 mg/3 mL) 0 083 % nebulizer solution, Take 1 vial (2 5 mg total) by nebulization every 6 (six) hours as needed for wheezing or shortness of breath, Disp: 10 vial, Rfl: 1    albuterol (PROVENTIL HFA,VENTOLIN HFA) 90 mcg/act inhaler, Inhale 1 puff every 6 (six) hours as needed for wheezing, Disp: 18 Inhaler, Rfl: 3    azelastine (ASTELIN) 0 1 % nasal spray, 1 spray into each nostril 2 (two) times a day Use in each nostril as directed, Disp: 1 Bottle, Rfl: 1    benzonatate (TESSALON) 200 MG capsule, Take 1 capsule (200 mg total) by mouth 3 (three) times a day as needed for cough, Disp: 20 capsule, Rfl: 3    buPROPion (WELLBUTRIN SR) 200 MG 12 hr tablet, , Disp: , Rfl:    Cholecalciferol (VITAMIN D) 50 MCG (2000 UT) tablet, Take 1 tablet (2,000 Units total) by mouth daily, Disp: 90 tablet, Rfl: 1    clonazePAM (KlonoPIN) 1 mg tablet, Take 1 tablet (1 mg total) by mouth 2 (two) times a day, Disp: 45 tablet, Rfl: 0    diclofenac (VOLTAREN) 75 mg EC tablet, TAKE 1 TABLET BY MOUTH TWICE A DAY (Patient not taking: Reported on 9/16/2020), Disp: 60 tablet, Rfl: 1    ergocalciferol (VITAMIN D2) 50,000 units, Take 1 capsule (50,000 Units total) by mouth once a week (Patient not taking: Reported on 1/16/2020), Disp: 4 capsule, Rfl: 2    ergocalciferol (VITAMIN D2) 50,000 units, Take 1 capsule (50,000 Units total) by mouth once a week (Patient not taking: Reported on 10/28/2020), Disp: 4 capsule, Rfl: 2    fexofenadine (ALLEGRA) 60 MG tablet, Take 1 tablet (60 mg total) by mouth daily, Disp: 30 tablet, Rfl: 1    fluticasone (FLONASE) 50 mcg/act nasal spray, 1 spray into each nostril daily, Disp: 16 mL, Rfl: 3    fluticasone-salmeterol (Advair HFA) 45-21 MCG/ACT inhaler, Inhale 2 puffs 2 (two) times a day Rinse mouth after use, Disp: 1 Inhaler, Rfl: 3    gabapentin (NEURONTIN) 600 MG tablet, TAKE 1 TABLET BY MOUTH THREE TIMES A DAY, Disp: 90 tablet, Rfl: 3    hydrochlorothiazide (HYDRODIURIL) 25 mg tablet, Take 1 tablet (25 mg total) by mouth daily, Disp: 90 tablet, Rfl: 1    meloxicam (MOBIC) 15 mg tablet, Take 1 tablet (15 mg total) by mouth daily, Disp: 30 tablet, Rfl: 1    methocarbamol (ROBAXIN) 750 mg tablet, Take 1 tablet (750 mg total) by mouth every 8 (eight) hours, Disp: 90 tablet, Rfl: 3    Misc   Devices (PULSE OXIMETER) MISC, by Does not apply route daily, Disp: 1 each, Rfl: 0    nystatin (MYCOSTATIN) 500,000 units/5 mL suspension, Apply 5 mL (500,000 Units total) to the mouth or throat 4 (four) times a day (Patient not taking: Reported on 10/28/2020), Disp: 100 mL, Rfl: 0    omeprazole (PriLOSEC) 40 MG capsule, Take 1 capsule (40 mg total) by mouth daily before breakfast, Disp: 90 capsule, Rfl: 3    phentermine 37 5 MG capsule, Take 1 capsule (37 5 mg total) by mouth every morning (Patient not taking: Reported on 9/16/2020), Disp: 30 capsule, Rfl: 1    simvastatin (ZOCOR) 40 mg tablet, TAKE 1 TABLET BY MOUTH DAILY AT BEDTIME (Patient not taking: Reported on 1/10/2020), Disp: 30 tablet, Rfl: 2    traMADol (ULTRAM) 50 mg tablet, Take 50 mg by mouth every 6 (six) hours as needed for moderate pain, Disp: , Rfl:     TRAZODONE HCL PO, Take 100 mg by mouth daily at bedtime, Disp: , Rfl:   No Known Allergies    Vitals: not currently breastfeeding  There is no height or weight on file to calculate BMI  Physical Exam  Physical Exam  Vitals signs reviewed  Constitutional:       General: She is not in acute distress  Appearance: She is normal weight  She is not ill-appearing, toxic-appearing or diaphoretic  HENT:      Head: Normocephalic and atraumatic  Right Ear: External ear normal       Left Ear: External ear normal       Nose: Nose normal    Eyes:      General: No scleral icterus  Right eye: No discharge  Left eye: No discharge  Conjunctiva/sclera: Conjunctivae normal    Cardiovascular:      Rate and Rhythm: Normal rate and regular rhythm  Pulses: Normal pulses  Heart sounds: Normal heart sounds  No murmur  No friction rub  No gallop  Pulmonary:      Effort: Pulmonary effort is normal  No respiratory distress  Breath sounds: Normal breath sounds  No stridor  No wheezing, rhonchi or rales  Chest:      Chest wall: No tenderness  Abdominal:      General: Bowel sounds are normal  There is no distension  Palpations: Abdomen is soft  Tenderness: There is no abdominal tenderness  There is no guarding or rebound  Musculoskeletal:      Right lower leg: No edema  Left lower leg: No edema  Skin:     General: Skin is warm and dry  Neurological:      Mental Status: She is oriented to person, place, and time  Labs: I have personally reviewed pertinent lab results  Lab Results   Component Value Date    WBC 6 93 08/10/2020    HGB 12 2 08/10/2020    HCT 40 3 08/10/2020     (H) 08/10/2020     08/10/2020     Lab Results   Component Value Date    GLUCOSE 102 12/16/2015    CALCIUM 8 3 08/10/2020     04/27/2018    K 4 3 08/10/2020    CO2 27 08/10/2020     (H) 08/10/2020    BUN 11 08/10/2020    CREATININE 0 74 08/10/2020     Lab Results   Component Value Date    IGE 82 12/16/2015    IGE 82 12/16/2015     Lab Results   Component Value Date    ALT 95 (H) 08/10/2020     (H) 08/10/2020    ALKPHOS 107 08/10/2020    BILITOT 0 5 04/27/2018         Imaging and other studies: I have personally reviewed pertinent reports  and I have personally reviewed pertinent films in PACS    Pulmonary function testing:     Spirometry:  FEV1/FVC Ratio is 89 %  FEV1 is 2 44 L/83 % of predicted  FVC is 2 73 L/75 % of predicted      Lung volumes:  RV 1 18 L/65 % of predicted, TLC 4 05 L/77 % of predicted, RV/TLC ratio 29 %     Diffusing capacity:  102 % of predicted    EKG, Pathology, and Other Studies: I have personally reviewed pertinent reports  Sleep study 11/4/20:  AHI 9 3/REM AHI 23 9  Recommended for auto titration CPAP      DO [facility]

## 2021-05-05 ENCOUNTER — OFFICE VISIT (OUTPATIENT)
Dept: PULMONOLOGY | Facility: CLINIC | Age: 46
End: 2021-05-05
Payer: COMMERCIAL

## 2021-05-05 VITALS
OXYGEN SATURATION: 95 % | HEART RATE: 76 BPM | TEMPERATURE: 97.3 F | HEIGHT: 65 IN | SYSTOLIC BLOOD PRESSURE: 120 MMHG | DIASTOLIC BLOOD PRESSURE: 80 MMHG | BODY MASS INDEX: 38.32 KG/M2 | WEIGHT: 230 LBS

## 2021-05-05 DIAGNOSIS — E66.01 MORBID OBESITY (HCC): ICD-10-CM

## 2021-05-05 DIAGNOSIS — G47.33 OSA (OBSTRUCTIVE SLEEP APNEA): ICD-10-CM

## 2021-05-05 DIAGNOSIS — R05.9 COUGH: ICD-10-CM

## 2021-05-05 DIAGNOSIS — J45.40 MODERATE PERSISTENT ASTHMA WITHOUT COMPLICATION: Primary | ICD-10-CM

## 2021-05-05 DIAGNOSIS — J30.89 ENVIRONMENTAL AND SEASONAL ALLERGIES: ICD-10-CM

## 2021-05-05 DIAGNOSIS — K21.9 GASTROESOPHAGEAL REFLUX DISEASE, UNSPECIFIED WHETHER ESOPHAGITIS PRESENT: ICD-10-CM

## 2021-05-05 PROCEDURE — 1036F TOBACCO NON-USER: CPT | Performed by: INTERNAL MEDICINE

## 2021-05-05 PROCEDURE — 99214 OFFICE O/P EST MOD 30 MIN: CPT | Performed by: INTERNAL MEDICINE

## 2021-05-05 RX ORDER — ALBUTEROL SULFATE 90 UG/1
1 AEROSOL, METERED RESPIRATORY (INHALATION) EVERY 6 HOURS PRN
Qty: 1 G | Refills: 3 | Status: SHIPPED | OUTPATIENT
Start: 2021-05-05

## 2021-05-05 RX ORDER — BENZONATATE 200 MG/1
200 CAPSULE ORAL 3 TIMES DAILY PRN
Qty: 20 CAPSULE | Refills: 3 | Status: SHIPPED | OUTPATIENT
Start: 2021-05-05

## 2021-05-20 ENCOUNTER — OFFICE VISIT (OUTPATIENT)
Dept: FAMILY MEDICINE CLINIC | Facility: CLINIC | Age: 46
End: 2021-05-20

## 2021-05-20 VITALS
TEMPERATURE: 97.9 F | BODY MASS INDEX: 38.99 KG/M2 | HEART RATE: 72 BPM | SYSTOLIC BLOOD PRESSURE: 120 MMHG | HEIGHT: 65 IN | OXYGEN SATURATION: 95 % | RESPIRATION RATE: 16 BRPM | WEIGHT: 234 LBS | DIASTOLIC BLOOD PRESSURE: 80 MMHG

## 2021-05-20 DIAGNOSIS — Z11.4 SCREENING FOR HIV (HUMAN IMMUNODEFICIENCY VIRUS): ICD-10-CM

## 2021-05-20 DIAGNOSIS — H91.93 BILATERAL HEARING LOSS, UNSPECIFIED HEARING LOSS TYPE: ICD-10-CM

## 2021-05-20 DIAGNOSIS — E66.01 MORBID OBESITY (HCC): ICD-10-CM

## 2021-05-20 DIAGNOSIS — I83.811 VARICOSE VEINS OF RIGHT LOWER EXTREMITY WITH PAIN: ICD-10-CM

## 2021-05-20 DIAGNOSIS — R23.8 EASY BRUISING: ICD-10-CM

## 2021-05-20 DIAGNOSIS — F33.41 RECURRENT MAJOR DEPRESSIVE DISORDER, IN PARTIAL REMISSION (HCC): ICD-10-CM

## 2021-05-20 DIAGNOSIS — R20.2 TINGLING: ICD-10-CM

## 2021-05-20 DIAGNOSIS — R73.03 PREDIABETES: ICD-10-CM

## 2021-05-20 DIAGNOSIS — Z86.16 HISTORY OF COVID-19: ICD-10-CM

## 2021-05-20 DIAGNOSIS — Z12.31 BREAST CANCER SCREENING BY MAMMOGRAM: Primary | ICD-10-CM

## 2021-05-20 DIAGNOSIS — R07.9 CHEST PAIN, UNSPECIFIED TYPE: ICD-10-CM

## 2021-05-20 PROCEDURE — 92551 PURE TONE HEARING TEST AIR: CPT | Performed by: PHYSICIAN ASSISTANT

## 2021-05-20 PROCEDURE — 3008F BODY MASS INDEX DOCD: CPT | Performed by: INTERNAL MEDICINE

## 2021-05-20 PROCEDURE — 99214 OFFICE O/P EST MOD 30 MIN: CPT | Performed by: PHYSICIAN ASSISTANT

## 2021-05-20 NOTE — ASSESSMENT & PLAN NOTE
BMI Counseling: Body mass index is 38 94 kg/m²  The BMI is above normal  Nutrition recommendations include consuming healthier snacks, decreasing soda and/or juice intake and moderation in carbohydrate intake

## 2021-05-20 NOTE — ASSESSMENT & PLAN NOTE
Discussed that with the new onset of the tingling should be a progress to diabetes  She is going to get hemoglobin A1c and labs done    Discussed decreasing sugars in her diet

## 2021-05-20 NOTE — PROGRESS NOTES
Assessment/Plan:    Varicose veins of right lower extremities with pain    Recommend consult with vascular surgery  Also recommend compression socks or stockings    Morbid obesity (HCC)  BMI Counseling: Body mass index is 38 94 kg/m²  The BMI is above normal  Nutrition recommendations include consuming healthier snacks, decreasing soda and/or juice intake and moderation in carbohydrate intake  Recurrent major depressive disorder, in partial remission ([facility] )   Stable continue follow-up with Psychiatry    Prediabetes   Discussed that with the new onset of the tingling should be a progress to diabetes  She is going to get hemoglobin A1c and labs done  Discussed decreasing sugars in her diet        No follow-ups on file  There are no Patient Instructions on file for this visit  Problem List Items Addressed This Visit        Cardiovascular and Mediastinum    Varicose veins of right lower extremities with pain       Recommend consult with vascular surgery  Also recommend compression socks or stockings         Relevant Orders    Ambulatory referral to Vascular Surgery       Other    Morbid obesity ([facility] )     BMI Counseling: Body mass index is 38 94 kg/m²  The BMI is above normal  Nutrition recommendations include consuming healthier snacks, decreasing soda and/or juice intake and moderation in carbohydrate intake  Recurrent major depressive disorder, in partial remission ([facility] )      Stable continue follow-up with Psychiatry         Prediabetes      Discussed that with the new onset of the tingling should be a progress to diabetes  She is going to get hemoglobin A1c and labs done    Discussed decreasing sugars in her diet           Other Visit Diagnoses     Breast cancer screening by mammogram    -  Primary    Relevant Orders    Mammo screening bilateral w cad    Tingling        Relevant Orders    CBC and differential    Comprehensive metabolic panel    TSH, 3rd generation    Vitamin B12 Hemoglobin A1c (w/out EAG)    HIV 1/2 Antigen/Antibody (4th Generation) w Reflex [facility]    Screening for HIV (human immunodeficiency virus)        Relevant Orders    HIV 1/2 Antigen/Antibody (4th Generation) w Reflex [facility]    Chest pain, unspecified type        Relevant Orders    POCT ECG (Completed)    Echo stress test w contrast if indicated    Easy bruising        Relevant Orders    Protime-INR    APTT    Bilateral hearing loss, unspecified hearing loss type        Relevant Orders    Ambulatory Referral to Otolaryngology    History of COVID-19        Relevant Orders    Echo stress test w contrast if indicated          Declined covid vaccine  Lorena Mc [id number] stratus  Subjective:     Edwardo Maloney is a 48 y o  female who  has a past medical history of Anemia, Anxiety, Arthritis, Asthma, Back pain, Carpal tunnel syndrome, Constipation, Depression, Environmental allergies, GERD (gastroesophageal reflux disease), H/O cardiac murmur, Insomnia, Kidney stone, Migraine, Psychiatric disorder, Pyelonephritis, and Wears glasses  She also has no past medical history of History of transfusion or Hypertension  who presented to the office today for tingling in her feet x 6 months  It is getting worse for last 1 month  It is the same in both feet  She does get tingling in both hands too but this has been about 1 year  She is getting a lot of cramping  It started after COVID infection  No problems with breathing anymore  HPI  Patient mentioned that she is having stabbing sensation and pressure in her chest for last 3 months  This is a new sensation for her  She gets it with activity and rest  It typically lastsless than 5 minutes  No association  She cannot hear well out of her ears  IT is both  She has not had her menses for 1year and 5 months   Shehas been getting easy bruising  She has appointment with gyn tomorrow and has just started light spotting for last 2 days       She also has a lot of varicose veins and spider veins  It is causing her pain  She does not wear compression socks  The following portions of the patient's history were reviewed and updated as appropriate:   She  has a past medical history of Anemia, Anxiety, Arthritis, Asthma, Back pain, Carpal tunnel syndrome, Constipation, Depression, Environmental allergies, GERD (gastroesophageal reflux disease), H/O cardiac murmur, Insomnia, Kidney stone, Migraine, Psychiatric disorder, Pyelonephritis, and Wears glasses  She   Patient Active Problem List    Diagnosis Date Noted    Apnea     Restless leg syndrome     TERESA (obstructive sleep apnea)     Cough 08/26/2020    Environmental and seasonal allergies 08/26/2020    Vitamin D deficiency 08/14/2020    Prediabetes 08/14/2020    Recurrent major depressive disorder, in partial remission ([facility] ) 08/10/2020    COVID-19 05/26/2020    Hematuria 01/10/2020    Pain in both hands 07/16/2019    Leg swelling 07/16/2019    Morbid obesity ([facility] ) 07/16/2019    Sacroiliitis ([facility] ) 03/18/2019    Chronic pain syndrome 11/26/2018    Lumbar spondylosis 11/26/2018    Lumbar disc herniation 11/26/2018    Varicose veins of right lower extremities with pain 06/05/2017    Anxiety 10/11/2016    Moderate asthma 10/11/2016    Chronic bilateral low back pain with bilateral sciatica 10/11/2016    Gastroesophageal reflux disease 10/11/2016    Recurrent urinary tract infection 10/11/2016     She  has a past surgical history that includes Cholecystectomy; Varicose vein surgery (Left); Back surgery; pr phleb veins - extrem - to 20 (Left, 12/22/2016); pr ligatn long saphenous vein at seph-fem junc (Left, 12/22/2016); Tubal ligation; Vein ligation (Right, 6/5/2017); Cystoscopy; and Esophagogastroduodenoscopy    Her family history includes Asthma in her father and mother; Breast cancer in her mother; Colon cancer in her mother; Coronary artery disease in her mother; Depression in her mother; Diabetes in her mother; Hypertension in her father and mother; Lung cancer in her father and mother; Varicose Veins in her mother  She  reports that she quit smoking about 21 months ago  Her smoking use included cigarettes  She has a 5 00 pack-year smoking history  She has never used smokeless tobacco  She reports current alcohol use  She reports that she does not use drugs    Current Outpatient Medications   Medication Sig Dispense Refill    albuterol (2 5 mg/3 mL) 0 083 % nebulizer solution Take 1 vial (2 5 mg total) by nebulization every 6 (six) hours as needed for wheezing or shortness of breath 10 vial 1    albuterol (PROVENTIL HFA,VENTOLIN HFA) 90 mcg/act inhaler Inhale 1 puff every 6 (six) hours as needed for wheezing 1 g 3    azelastine (ASTELIN) 0 1 % nasal spray 1 spray into each nostril 2 (two) times a day Use in each nostril as directed 1 Bottle 1    benzonatate (TESSALON) 200 MG capsule Take 1 capsule (200 mg total) by mouth 3 (three) times a day as needed for cough 20 capsule 3    buPROPion (WELLBUTRIN SR) 200 MG 12 hr tablet       Cholecalciferol (VITAMIN D) 50 MCG (2000 UT) tablet Take 1 tablet (2,000 Units total) by mouth daily 90 tablet 1    clonazePAM (KlonoPIN) 1 mg tablet Take 1 tablet (1 mg total) by mouth 2 (two) times a day 45 tablet 0    diclofenac (VOLTAREN) 75 mg EC tablet TAKE 1 TABLET BY MOUTH TWICE A DAY 60 tablet 1    fexofenadine (ALLEGRA) 60 MG tablet Take 1 tablet (60 mg total) by mouth daily 30 tablet 1    fluticasone (FLONASE) 50 mcg/act nasal spray 1 spray into each nostril daily 16 mL 3    fluticasone-salmeterol (Advair HFA) 45-21 MCG/ACT inhaler Inhale 2 puffs 2 (two) times a day Rinse mouth after use 1 Inhaler 3    gabapentin (NEURONTIN) 600 MG tablet TAKE 1 TABLET BY MOUTH THREE TIMES A DAY 90 tablet 3    hydrochlorothiazide (HYDRODIURIL) 25 mg tablet Take 1 tablet (25 mg total) by mouth daily 90 tablet 1    methocarbamol (ROBAXIN) 750 mg tablet Take 1 tablet (750 mg total) by mouth every 8 (eight) hours 90 tablet 3    Misc  Devices (PULSE OXIMETER) MISC by Does not apply route daily 1 each 0    omeprazole (PriLOSEC) 40 MG capsule Take 1 capsule (40 mg total) by mouth daily before breakfast 90 capsule 3    TRAZODONE HCL PO Take 100 mg by mouth daily at bedtime      ergocalciferol (VITAMIN D2) 50,000 units Take 1 capsule (50,000 Units total) by mouth once a week (Patient not taking: Reported on 1/16/2020) 4 capsule 2    ergocalciferol (VITAMIN D2) 50,000 units Take 1 capsule (50,000 Units total) by mouth once a week (Patient not taking: Reported on 10/28/2020) 4 capsule 2    nystatin (MYCOSTATIN) 500,000 units/5 mL suspension Apply 5 mL (500,000 Units total) to the mouth or throat 4 (four) times a day (Patient not taking: Reported on 10/28/2020) 100 mL 0    phentermine 37 5 MG capsule Take 1 capsule (37 5 mg total) by mouth every morning (Patient not taking: Reported on 9/16/2020) 30 capsule 1    simvastatin (ZOCOR) 40 mg tablet TAKE 1 TABLET BY MOUTH DAILY AT BEDTIME (Patient not taking: Reported on 1/10/2020) 30 tablet 2    traMADol (ULTRAM) 50 mg tablet Take 50 mg by mouth every 6 (six) hours as needed for moderate pain       No current facility-administered medications for this visit        Current Outpatient Medications on File Prior to Visit   Medication Sig    albuterol (2 5 mg/3 mL) 0 083 % nebulizer solution Take 1 vial (2 5 mg total) by nebulization every 6 (six) hours as needed for wheezing or shortness of breath    albuterol (PROVENTIL HFA,VENTOLIN HFA) 90 mcg/act inhaler Inhale 1 puff every 6 (six) hours as needed for wheezing    azelastine (ASTELIN) 0 1 % nasal spray 1 spray into each nostril 2 (two) times a day Use in each nostril as directed    benzonatate (TESSALON) 200 MG capsule Take 1 capsule (200 mg total) by mouth 3 (three) times a day as needed for cough    buPROPion (WELLBUTRIN SR) 200 MG 12 hr tablet     Cholecalciferol (VITAMIN D) 50 MCG (2000 UT) tablet Take 1 tablet (2,000 Units total) by mouth daily    clonazePAM (KlonoPIN) 1 mg tablet Take 1 tablet (1 mg total) by mouth 2 (two) times a day    diclofenac (VOLTAREN) 75 mg EC tablet TAKE 1 TABLET BY MOUTH TWICE A DAY    fexofenadine (ALLEGRA) 60 MG tablet Take 1 tablet (60 mg total) by mouth daily    fluticasone (FLONASE) 50 mcg/act nasal spray 1 spray into each nostril daily    fluticasone-salmeterol (Advair HFA) 45-21 MCG/ACT inhaler Inhale 2 puffs 2 (two) times a day Rinse mouth after use    gabapentin (NEURONTIN) 600 MG tablet TAKE 1 TABLET BY MOUTH THREE TIMES A DAY    hydrochlorothiazide (HYDRODIURIL) 25 mg tablet Take 1 tablet (25 mg total) by mouth daily    methocarbamol (ROBAXIN) 750 mg tablet Take 1 tablet (750 mg total) by mouth every 8 (eight) hours    Misc   Devices (PULSE OXIMETER) MISC by Does not apply route daily    omeprazole (PriLOSEC) 40 MG capsule Take 1 capsule (40 mg total) by mouth daily before breakfast    TRAZODONE HCL PO Take 100 mg by mouth daily at bedtime    ergocalciferol (VITAMIN D2) 50,000 units Take 1 capsule (50,000 Units total) by mouth once a week (Patient not taking: Reported on 1/16/2020)    ergocalciferol (VITAMIN D2) 50,000 units Take 1 capsule (50,000 Units total) by mouth once a week (Patient not taking: Reported on 10/28/2020)    nystatin (MYCOSTATIN) 500,000 units/5 mL suspension Apply 5 mL (500,000 Units total) to the mouth or throat 4 (four) times a day (Patient not taking: Reported on 10/28/2020)    phentermine 37 5 MG capsule Take 1 capsule (37 5 mg total) by mouth every morning (Patient not taking: Reported on 9/16/2020)    simvastatin (ZOCOR) 40 mg tablet TAKE 1 TABLET BY MOUTH DAILY AT BEDTIME (Patient not taking: Reported on 1/10/2020)    traMADol (ULTRAM) 50 mg tablet Take 50 mg by mouth every 6 (six) hours as needed for moderate pain    [DISCONTINUED] gabapentin (NEURONTIN) 600 MG tablet Take 1 tablet (600 mg total) by mouth 3 (three) times a day     No current facility-administered medications on file prior to visit  She has No Known Allergies       Current Outpatient Medications on File Prior to Visit   Medication Sig Dispense Refill    albuterol (2 5 mg/3 mL) 0 083 % nebulizer solution Take 1 vial (2 5 mg total) by nebulization every 6 (six) hours as needed for wheezing or shortness of breath 10 vial 1    albuterol (PROVENTIL HFA,VENTOLIN HFA) 90 mcg/act inhaler Inhale 1 puff every 6 (six) hours as needed for wheezing 1 g 3    azelastine (ASTELIN) 0 1 % nasal spray 1 spray into each nostril 2 (two) times a day Use in each nostril as directed 1 Bottle 1    benzonatate (TESSALON) 200 MG capsule Take 1 capsule (200 mg total) by mouth 3 (three) times a day as needed for cough 20 capsule 3    buPROPion (WELLBUTRIN SR) 200 MG 12 hr tablet       Cholecalciferol (VITAMIN D) 50 MCG (2000 UT) tablet Take 1 tablet (2,000 Units total) by mouth daily 90 tablet 1    clonazePAM (KlonoPIN) 1 mg tablet Take 1 tablet (1 mg total) by mouth 2 (two) times a day 45 tablet 0    diclofenac (VOLTAREN) 75 mg EC tablet TAKE 1 TABLET BY MOUTH TWICE A DAY 60 tablet 1    fexofenadine (ALLEGRA) 60 MG tablet Take 1 tablet (60 mg total) by mouth daily 30 tablet 1    fluticasone (FLONASE) 50 mcg/act nasal spray 1 spray into each nostril daily 16 mL 3    fluticasone-salmeterol (Advair HFA) 45-21 MCG/ACT inhaler Inhale 2 puffs 2 (two) times a day Rinse mouth after use 1 Inhaler 3    gabapentin (NEURONTIN) 600 MG tablet TAKE 1 TABLET BY MOUTH THREE TIMES A DAY 90 tablet 3    hydrochlorothiazide (HYDRODIURIL) 25 mg tablet Take 1 tablet (25 mg total) by mouth daily 90 tablet 1    methocarbamol (ROBAXIN) 750 mg tablet Take 1 tablet (750 mg total) by mouth every 8 (eight) hours 90 tablet 3    Misc   Devices (PULSE OXIMETER) MISC by Does not apply route daily 1 each 0    omeprazole (PriLOSEC) 40 MG capsule Take 1 capsule (40 mg total) by mouth daily before breakfast 90 capsule 3    TRAZODONE HCL PO Take 100 mg by mouth daily at bedtime      ergocalciferol (VITAMIN D2) 50,000 units Take 1 capsule (50,000 Units total) by mouth once a week (Patient not taking: Reported on 1/16/2020) 4 capsule 2    ergocalciferol (VITAMIN D2) 50,000 units Take 1 capsule (50,000 Units total) by mouth once a week (Patient not taking: Reported on 10/28/2020) 4 capsule 2    nystatin (MYCOSTATIN) 500,000 units/5 mL suspension Apply 5 mL (500,000 Units total) to the mouth or throat 4 (four) times a day (Patient not taking: Reported on 10/28/2020) 100 mL 0    phentermine 37 5 MG capsule Take 1 capsule (37 5 mg total) by mouth every morning (Patient not taking: Reported on 9/16/2020) 30 capsule 1    simvastatin (ZOCOR) 40 mg tablet TAKE 1 TABLET BY MOUTH DAILY AT BEDTIME (Patient not taking: Reported on 1/10/2020) 30 tablet 2    traMADol (ULTRAM) 50 mg tablet Take 50 mg by mouth every 6 (six) hours as needed for moderate pain      [DISCONTINUED] gabapentin (NEURONTIN) 600 MG tablet Take 1 tablet (600 mg total) by mouth 3 (three) times a day 90 tablet 3     No current facility-administered medications on file prior to visit  Review of Systems   Constitutional: Negative for activity change, appetite change, chills, fatigue and unexpected weight change  HENT: Positive for hearing loss  Negative for ear discharge, ear pain and sore throat  Eyes: Negative for visual disturbance  Respiratory: Negative for cough and wheezing  Cardiovascular: Positive for chest pain and leg swelling  Gastrointestinal: Negative for abdominal pain, constipation, diarrhea and vomiting  Genitourinary: Negative for difficulty urinating and dysuria  Musculoskeletal: Negative for arthralgias, back pain and myalgias  Skin: Negative for rash  Neurological: Negative for dizziness and headaches  Tingling     Hematological: Bruises/bleeds easily     Psychiatric/Behavioral: The patient is not nervous/anxious  Objective:    /80 (BP Location: Left arm, Patient Position: Sitting, Cuff Size: Large)   Pulse 72   Temp 97 9 °F (36 6 °C) (Temporal)   Resp 16   Ht 5' 5" (1 651 m)   Wt 106 kg (234 lb)   SpO2 95%   Breastfeeding No   BMI 38 94 kg/m²     Physical Exam  Vitals signs and nursing note reviewed  Constitutional:       General: She is not in acute distress  Appearance: She is well-developed  HENT:      Head: Normocephalic and atraumatic  Right Ear: Tympanic membrane, ear canal and external ear normal       Left Ear: Tympanic membrane, ear canal and external ear normal    Eyes:      Conjunctiva/sclera: Conjunctivae normal    Neck:      Musculoskeletal: Normal range of motion and neck supple  Thyroid: No thyromegaly  Cardiovascular:      Rate and Rhythm: Normal rate and regular rhythm  Heart sounds: Normal heart sounds  No murmur  Comments: Multiple varicose veingsboth legs   Pulmonary:      Effort: Pulmonary effort is normal  No respiratory distress  Breath sounds: Normal breath sounds  No wheezing  Lymphadenopathy:      Cervical: No cervical adenopathy  Neurological:      Mental Status: She is alert and oriented to person, place, and time     Psychiatric:         Mood and Affect: Mood normal          Behavior: Behavior normal       EKG normal sinus rhythm   Hearing Screening    125Hz 250Hz 500Hz 1000Hz 2000Hz 3000Hz 4000Hz 6000Hz 8000Hz   Right ear:   0 40 40  40     Left ear:   0 40 40  40           Shani Nuñez PA-C  05/21/21  9:10 AM

## 2021-05-21 PROCEDURE — 93000 ELECTROCARDIOGRAM COMPLETE: CPT | Performed by: PHYSICIAN ASSISTANT

## 2021-06-12 DIAGNOSIS — J45.40 MODERATE PERSISTENT ASTHMA WITHOUT COMPLICATION: ICD-10-CM

## 2021-06-12 RX ORDER — FLUTICASONE PROPIONATE 50 MCG
SPRAY, SUSPENSION (ML) NASAL
Qty: 16 ML | Refills: 3 | Status: SHIPPED | OUTPATIENT
Start: 2021-06-12 | End: 2022-05-18 | Stop reason: SDUPTHER

## 2021-06-18 ENCOUNTER — TELEPHONE (OUTPATIENT)
Dept: PULMONOLOGY | Facility: CLINIC | Age: 46
End: 2021-06-18

## 2021-06-23 ENCOUNTER — TELEPHONE (OUTPATIENT)
Dept: FAMILY MEDICINE CLINIC | Facility: CLINIC | Age: 46
End: 2021-06-23

## 2021-06-23 NOTE — TELEPHONE ENCOUNTER
Pt left a voicemail on the nurse line, I called back pt states she had cramps on her legs and back, if the PCP can prescribe medication for the pain, I offered appointment pt denied

## 2021-06-24 ENCOUNTER — LAB (OUTPATIENT)
Dept: LAB | Facility: CLINIC | Age: 46
End: 2021-06-24
Payer: COMMERCIAL

## 2021-06-24 DIAGNOSIS — J45.40 MODERATE PERSISTENT ASTHMA WITHOUT COMPLICATION: ICD-10-CM

## 2021-06-24 DIAGNOSIS — Z11.4 SCREENING FOR HIV (HUMAN IMMUNODEFICIENCY VIRUS): ICD-10-CM

## 2021-06-24 DIAGNOSIS — K21.9 GASTROESOPHAGEAL REFLUX DISEASE, UNSPECIFIED WHETHER ESOPHAGITIS PRESENT: ICD-10-CM

## 2021-06-24 DIAGNOSIS — R23.8 EASY BRUISING: ICD-10-CM

## 2021-06-24 DIAGNOSIS — J30.89 ENVIRONMENTAL AND SEASONAL ALLERGIES: ICD-10-CM

## 2021-06-24 DIAGNOSIS — R20.2 TINGLING: ICD-10-CM

## 2021-06-24 LAB
ALBUMIN SERPL BCP-MCNC: 3.2 G/DL (ref 3.5–5)
ALP SERPL-CCNC: 156 U/L (ref 46–116)
ALT SERPL W P-5'-P-CCNC: 124 U/L (ref 12–78)
ANION GAP SERPL CALCULATED.3IONS-SCNC: 5 MMOL/L (ref 4–13)
APTT PPP: 29 SECONDS (ref 23–37)
AST SERPL W P-5'-P-CCNC: 140 U/L (ref 5–45)
BASOPHILS # BLD AUTO: 0.02 THOUSANDS/ΜL (ref 0–0.1)
BASOPHILS NFR BLD AUTO: 0 % (ref 0–1)
BILIRUB SERPL-MCNC: 0.74 MG/DL (ref 0.2–1)
BUN SERPL-MCNC: 11 MG/DL (ref 5–25)
CALCIUM ALBUM COR SERPL-MCNC: 9.9 MG/DL (ref 8.3–10.1)
CALCIUM SERPL-MCNC: 9.3 MG/DL (ref 8.3–10.1)
CHLORIDE SERPL-SCNC: 101 MMOL/L (ref 100–108)
CO2 SERPL-SCNC: 27 MMOL/L (ref 21–32)
CREAT SERPL-MCNC: 0.73 MG/DL (ref 0.6–1.3)
EOSINOPHIL # BLD AUTO: 0.06 THOUSAND/ΜL (ref 0–0.61)
EOSINOPHIL NFR BLD AUTO: 1 % (ref 0–6)
ERYTHROCYTE [DISTWIDTH] IN BLOOD BY AUTOMATED COUNT: 12.1 % (ref 11.6–15.1)
EST. AVERAGE GLUCOSE BLD GHB EST-MCNC: 318 MG/DL
GFR SERPL CREATININE-BSD FRML MDRD: 98 ML/MIN/1.73SQ M
GLUCOSE P FAST SERPL-MCNC: 352 MG/DL (ref 65–99)
HBA1C MFR BLD: 12.7 %
HCT VFR BLD AUTO: 43.4 % (ref 34.8–46.1)
HGB BLD-MCNC: 13.9 G/DL (ref 11.5–15.4)
IMM GRANULOCYTES # BLD AUTO: 0.01 THOUSAND/UL (ref 0–0.2)
IMM GRANULOCYTES NFR BLD AUTO: 0 % (ref 0–2)
INR PPP: 1 (ref 0.84–1.19)
LYMPHOCYTES # BLD AUTO: 1.87 THOUSANDS/ΜL (ref 0.6–4.47)
LYMPHOCYTES NFR BLD AUTO: 34 % (ref 14–44)
MCH RBC QN AUTO: 30.9 PG (ref 26.8–34.3)
MCHC RBC AUTO-ENTMCNC: 32 G/DL (ref 31.4–37.4)
MCV RBC AUTO: 96 FL (ref 82–98)
MONOCYTES # BLD AUTO: 0.39 THOUSAND/ΜL (ref 0.17–1.22)
MONOCYTES NFR BLD AUTO: 7 % (ref 4–12)
NEUTROPHILS # BLD AUTO: 3.22 THOUSANDS/ΜL (ref 1.85–7.62)
NEUTS SEG NFR BLD AUTO: 58 % (ref 43–75)
NRBC BLD AUTO-RTO: 0 /100 WBCS
PLATELET # BLD AUTO: 160 THOUSANDS/UL (ref 149–390)
PMV BLD AUTO: 11.3 FL (ref 8.9–12.7)
POTASSIUM SERPL-SCNC: 4.3 MMOL/L (ref 3.5–5.3)
PROT SERPL-MCNC: 8.3 G/DL (ref 6.4–8.2)
PROTHROMBIN TIME: 13.2 SECONDS (ref 11.6–14.5)
RBC # BLD AUTO: 4.5 MILLION/UL (ref 3.81–5.12)
SODIUM SERPL-SCNC: 133 MMOL/L (ref 136–145)
TSH SERPL DL<=0.05 MIU/L-ACNC: 0.86 UIU/ML (ref 0.36–3.74)
VIT B12 SERPL-MCNC: 562 PG/ML (ref 100–900)
WBC # BLD AUTO: 5.57 THOUSAND/UL (ref 4.31–10.16)

## 2021-06-24 PROCEDURE — 82785 ASSAY OF IGE: CPT

## 2021-06-24 PROCEDURE — 3046F HEMOGLOBIN A1C LEVEL >9.0%: CPT | Performed by: INTERNAL MEDICINE

## 2021-06-24 PROCEDURE — 85610 PROTHROMBIN TIME: CPT

## 2021-06-24 PROCEDURE — 87389 HIV-1 AG W/HIV-1&-2 AB AG IA: CPT

## 2021-06-24 PROCEDURE — 84443 ASSAY THYROID STIM HORMONE: CPT

## 2021-06-24 PROCEDURE — 85025 COMPLETE CBC W/AUTO DIFF WBC: CPT

## 2021-06-24 PROCEDURE — 86003 ALLG SPEC IGE CRUDE XTRC EA: CPT

## 2021-06-24 PROCEDURE — 80053 COMPREHEN METABOLIC PANEL: CPT

## 2021-06-24 PROCEDURE — 83036 HEMOGLOBIN GLYCOSYLATED A1C: CPT

## 2021-06-24 PROCEDURE — 36415 COLL VENOUS BLD VENIPUNCTURE: CPT

## 2021-06-24 PROCEDURE — 85730 THROMBOPLASTIN TIME PARTIAL: CPT

## 2021-06-24 PROCEDURE — 82607 VITAMIN B-12: CPT

## 2021-06-25 LAB
A ALTERNATA IGE QN: <0.1 KUA/I
A FUMIGATUS IGE QN: <0.1 KUA/I
ALLERGEN COMMENT: NORMAL
BERMUDA GRASS IGE QN: <0.1 KUA/I
BOXELDER IGE QN: <0.1 KUA/I
C HERBARUM IGE QN: <0.1 KUA/I
CAT DANDER IGE QN: <0.1 KUA/I
CMN PIGWEED IGE QN: <0.1 KUA/I
COMMON RAGWEED IGE QN: <0.1 KUA/I
COTTONWOOD IGE QN: <0.1 KUA/I
D FARINAE IGE QN: <0.1 KUA/I
D PTERONYSS IGE QN: <0.1 KUA/I
DOG DANDER IGE QN: <0.1 KUA/I
HIV 1+2 AB+HIV1 P24 AG SERPL QL IA: NORMAL
LONDON PLANE IGE QN: <0.1 KUA/I
MOUSE URINE PROT IGE QN: <0.1 KUA/I
MT JUNIPER IGE QN: <0.1 KUA/I
MUGWORT IGE QN: <0.1 KUA/I
P NOTATUM IGE QN: <0.1 KUA/I
ROACH IGE QN: <0.1 KUA/I
SHEEP SORREL IGE QN: <0.1 KUA/I
SILVER BIRCH IGE QN: <0.1 KUA/I
TIMOTHY IGE QN: <0.1 KUA/I
TOTAL IGE SMQN RAST: 85.6 KU/L (ref 0–113)
WALNUT IGE QN: <0.1 KUA/I
WHITE ASH IGE QN: <0.1 KUA/I
WHITE ELM IGE QN: <0.1 KUA/I
WHITE MULBERRY IGE QN: <0.1 KUA/I
WHITE OAK IGE QN: <0.1 KUA/I

## 2021-07-02 ENCOUNTER — TELEPHONE (OUTPATIENT)
Dept: PULMONOLOGY | Facility: CLINIC | Age: 46
End: 2021-07-02

## 2021-07-02 NOTE — TELEPHONE ENCOUNTER
Pt called and would like th have an order placed for the nasal pillows and not a full face mask    Please advise

## 2021-07-19 PROBLEM — I83.813 VARICOSE VEINS OF BOTH LOWER EXTREMITIES WITH PAIN: Status: ACTIVE | Noted: 2017-06-05

## 2021-07-19 NOTE — ASSESSMENT & PLAN NOTE
-continue to encourage weight management, lifestyle modifications and diet modifications for prevention of vascular disease and adjuvant therapy of symptomatic varicose veins  -continue follow-up and management with PCP

## 2021-07-19 NOTE — PATIENT INSTRUCTIONS
Varicose Veins   WHAT YOU NEED TO KNOW:   What are varicose veins? Varicose veins are veins that become large, twisted, and swollen  They are common on the back of the calves, knees, and thighs  Varicose veins are caused by valves in your veins that do not work properly  This causes blood to collect and increase pressure in the veins of your legs  The increased pressure causes your veins to stretch, get larger, swell, and twist        What increases my risk for varicose veins? · Pregnancy    · A family history of varicose veins    · Being overweight or obese    · Age 48 years or older    · Sitting or standing for long periods of time    · Wearing tight clothing  What are the signs and symptoms of varicose veins? Your symptoms may be worse after you stand or sit for long periods of time  You may have any of the following:  · Blue, purple, or bulging veins in your legs     · Pain, swelling, or muscle cramps in your legs    · Feeling of fatigue or heaviness in your legs  How are varicose veins diagnosed? Your healthcare provider will examine your legs and ask about your medical history  You may need tests, such as a Doppler ultrasound or duplex scan  These tests show your veins and valves, and how your blood is flowing through them  These tests may also show if there is a blockage or blood clot  How are varicose veins treated? The goal of treatment is to decrease symptoms, improve appearance, and prevent further problems  Treatment will depend on which veins are affected and how severe your condition is  You may need procedures to treat or remove your varicose veins  For example, your healthcare provider may inject a solution or use a laser to close the varicose veins  Surgery to remove long veins may also be done  Ask your healthcare provider for more information about procedures used to treat varicose veins  What can I do to manage my symptoms? · Do not sit or stand for long periods of time    This can cause the blood to collect in your legs and make your symptoms worse  Bend or rotate your ankles several times every hour  Walk around for a few minutes every hour to get blood moving in your legs  · Do not cross your legs when you sit  This decreases blood flow to your feet and can make your symptoms worse  · Do not wear tight clothing or shoes  Do not wear high-heeled shoes  Do not wear clothes that are tight around the waist or knees  · Maintain a healthy weight  Being overweight or obese can make your varicose veins worse  Ask your healthcare provider how much you should weigh  Ask him or her to help you create a weight loss plan if you are overweight  · Wear pressure stockings as directed  The stockings are tight and put pressure on your legs  They improve blood flow and help prevent clots  · Elevate your legs  Keep them above the level of your heart for 15 to 30 minutes several times a day  You can also prop the end of your bed up slightly to elevate your legs while you sleep  This will help blood to flow back to your heart  · Get regular exercise  Talk to your healthcare provider about the best exercise plan for you  Exercise can improve blood flow to your legs and feet  When should I seek immediate care? · You have a wound that does not heal or is infected  · You have an injury that has broken your skin and caused your varicose veins to bleed  · Your leg is swollen and hard  · You notice that your legs or feet are turning blue or black  · Your leg feels warm, tender, and painful  It may look swollen and red  When should I contact my healthcare provider? · You have pain in your leg that does not go away or gets worse  · You notice sudden large bruising on your legs  · You have a rash on your leg  · Your symptoms keep you from doing your daily activities  · You have questions or concerns about your condition or care    CARE AGREEMENT:   You have the right to help plan your care  Learn about your health condition and how it may be treated  Discuss treatment options with your caregivers to decide what care you want to receive  You always have the right to refuse treatment  The above information is an  only  It is not intended as medical advice for individual conditions or treatments  Talk to your doctor, nurse or pharmacist before following any medical regimen to see if it is safe and effective for you  © 2017 [employer]  Information is for End User's use only and may not be sold, redistributed or otherwise used for commercial purposes  All illustrations and images included in CareNotes® are the copyrighted property of [employer]  or [employer]      -recommend continue conservative measures to include daily use of prescription compression stockings, leg elevation, low-salt diet, aerobic activity, weight management and lotion to leg to help promote good skin health  -place your compression stockings on in the morning and remove prior to bed  -we will schedule you for a lower extremity venous reflux study to assess the function of your valves in your veins to help guide treatment options  -return to office with surgeon after reflux study for re-evaluation   -please contact the office in the interim with any questions, concerns or new symptoms

## 2021-07-19 NOTE — ASSESSMENT & PLAN NOTE
18-year-old Welsh-speaking female former smoker with prediabetes, TERESA, GERD, obesity, depression, lumbar spondylosis, COVID-19 infection 4/'20 and symptomatic BLE varicose veins w/bilateral anterior sensory saphenous vein insufficiency and chronic L short saphenous SVT, s/p bilateral AASV ligation and stab phlebectomies (R - 24 stabs, 6/5/2017, L-15 stabs, 12/22/16) by Dr Promise Segura referred by PCP for re-evaluation of symptomatic BLE varicose veins  -patient's symptoms initially resolved after prior intervention but complains of recurrent varicosities and lower extremity pain, burning, R>L, x 1 year  Patient not wearing compression on regular basis  Previously were compression without symptomatic relief     -no recent imaging studies  -LEVDR 8/31/2016 reviewed with bilateral AASV incompetence with chronic SSV SVT and no evidence of deep, GSV or  incompetence    -continue conservative measures to include daily use of compression stockings, lower extremity elevation, low-sodium diet, aerobic activity, weight management and skin moisturization  -repeat LEVDR  -return to office with surgeon with [facility] for re-evaluation and discussion of surgical options  -instructed to contact the office in the interim with any questions, concerns or new symptoms

## 2021-07-21 ENCOUNTER — CONSULT (OUTPATIENT)
Dept: VASCULAR SURGERY | Facility: CLINIC | Age: 46
End: 2021-07-21
Payer: COMMERCIAL

## 2021-07-21 VITALS
RESPIRATION RATE: 18 BRPM | WEIGHT: 226 LBS | HEIGHT: 65 IN | SYSTOLIC BLOOD PRESSURE: 122 MMHG | HEART RATE: 75 BPM | BODY MASS INDEX: 37.65 KG/M2 | DIASTOLIC BLOOD PRESSURE: 84 MMHG

## 2021-07-21 DIAGNOSIS — I83.813 VARICOSE VEINS OF BOTH LOWER EXTREMITIES WITH PAIN: Primary | ICD-10-CM

## 2021-07-21 DIAGNOSIS — E66.01 MORBID OBESITY (HCC): ICD-10-CM

## 2021-07-21 DIAGNOSIS — R73.03 PREDIABETES: ICD-10-CM

## 2021-07-21 DIAGNOSIS — I83.811 VARICOSE VEINS OF RIGHT LOWER EXTREMITY WITH PAIN: ICD-10-CM

## 2021-07-21 PROCEDURE — 99244 OFF/OP CNSLTJ NEW/EST MOD 40: CPT | Performed by: PHYSICIAN ASSISTANT

## 2021-07-21 PROCEDURE — 1036F TOBACCO NON-USER: CPT | Performed by: PHYSICIAN ASSISTANT

## 2021-07-21 NOTE — PROGRESS NOTES
Assessment/Plan:    Varicose veins of both lower extremities with pain  47-year-old Yakut-speaking female former smoker with prediabetes, TERESA, GERD, obesity, depression, lumbar spondylosis, COVID-19 infection 4/'20 and symptomatic BLE varicose veins w/bilateral anterior sensory saphenous vein insufficiency and chronic L short saphenous SVT, s/p bilateral AASV ligation and stab phlebectomies (R - 24 stabs, 6/5/2017, L-15 stabs, 12/22/16) by Dr Andrés Rosales referred by PCP for re-evaluation of symptomatic BLE varicose veins  -patient's symptoms initially resolved after prior intervention but complains of recurrent varicosities and lower extremity pain, burning, R>L, x 1 year  Patient not wearing compression on regular basis  Previously were compression without symptomatic relief     -no recent imaging studies  -LEVDR 8/31/2016 reviewed with bilateral AASV incompetence with chronic SSV SVT and no evidence of deep, GSV or  incompetence  -continue conservative measures to include daily use of compression stockings, lower extremity elevation, low-sodium diet, aerobic activity, weight management and skin moisturization  -repeat LEVDR  -return to office with surgeon with [facility] for re-evaluation and discussion of surgical options  -instructed to contact the office in the interim with any questions, concerns or new symptoms    Prediabetes  -stable  -continue to optimize BS control for prevention of vascular disease  -management per PCP    Morbid obesity ([facility] [address] )  -continue to encourage weight management, lifestyle modifications and diet modifications for prevention of vascular disease and adjuvant therapy of symptomatic varicose veins  -continue follow-up and management with PCP       Diagnoses and all orders for this visit:    Varicose veins of both lower extremities with pain  -     VAS Lower extremity venous insufficiency duplex, bilateral w/ measurements;  Future  -     Compression Stocking    Prediabetes    Morbid obesity ([facility] [address] )    Varicose veins of right lower extremities with pain  -     Ambulatory referral to Vascular Surgery          Subjective:      Patient ID: Jade De La Cruz is a 48 y o  female  Patient is new to our practice and was referred by hSani Nuñez PA-C  Pt c/o BLE painful veins for the past 4-5 years  Pt has aching, cramping, and tiredness  Pt has itching and swelling in her legs  Pt does not wear compression stockings, but does elevate her legs  Pt has had VV sx in 2016 and 2017 by Dr Yrn Wetzel  Harini Alves 61-year-old Romanian-speaking female former smoker with prediabetes, TERESA, GERD, obesity, depression, lumbar spondylosis, COVID-19 infection 4/'20 and symptomatic BLE varicose veins w/bilateral anterior sensory saphenous vein insufficiency and chronic L short saphenous SVT, s/p bilateral AASV ligation and stab phlebectomies (R - 24 stabs, 6/5/2017, L-15 stabs, 12/22/16) by Dr Yrn Wetzel referred by PCP for re-evaluation of symptomatic BLE varicose veins  Patient's symptoms initially resolved after prior intervention but complains of recurrent varicosities and lower extremity pain, burning, R>L, x 1 year  Patient not wearing compression on regular basis  Previously wore compression without symptomatic relief  No recent imaging studies  LEVDR 8/31/2016 reviewed with bilateral AASV incompetence with chronic SSV SVT and no evidence of deep, GSV or  incompetence  The following portions of the patient's history were reviewed and updated as appropriate: allergies, current medications, past family history, past medical history, past social history, past surgical history and problem list     Review of Systems   Constitutional: Positive for activity change, appetite change and fatigue  HENT: Negative  Eyes: Negative  Respiratory: Positive for apnea  Cardiovascular: Positive for chest pain and leg swelling          Painful veins   Gastrointestinal: Positive for abdominal pain and nausea  Endocrine: Positive for cold intolerance and heat intolerance  Genitourinary: Positive for frequency and hematuria  Musculoskeletal: Positive for arthralgias, back pain, myalgias and neck pain  Leg pain  Leg cramping when walking   Skin: Negative  Allergic/Immunologic: Negative  Neurological: Positive for dizziness  Hematological: Negative  Psychiatric/Behavioral: Positive for confusion and sleep disturbance  The patient is nervous/anxious  Depression       I have reviewed and made appropriate changes to the review of systems input by the medical assistant      Vitals:    07/21/21 1037   BP: 122/84   BP Location: Left arm   Patient Position: Sitting   Pulse: 75   Resp: 18   Weight: 103 kg (226 lb)   Height: 5' 5" (1 651 m)       Patient Active Problem List   Diagnosis    Anxiety    Moderate asthma    Chronic bilateral low back pain with bilateral sciatica    Gastroesophageal reflux disease    Recurrent urinary tract infection    Varicose veins of both lower extremities with pain    Chronic pain syndrome    Lumbar spondylosis    Lumbar disc herniation    Sacroiliitis (HCC)    Pain in both hands    Leg swelling    Morbid obesity (HCC)    Hematuria    COVID-19    Recurrent major depressive disorder, in partial remission ([facility] [address] )    Vitamin D deficiency    Prediabetes    Cough    Environmental and seasonal allergies    TERESA (obstructive sleep apnea)    Apnea    Restless leg syndrome       Past Surgical History:   Procedure Laterality Date    BACK SURGERY      States she isn't sure what she had done-possibly something to do with a lump or muscle    CHOLECYSTECTOMY      CYSTOSCOPY      onset: 6/1/16, resolved: 6/1/16    ESOPHAGOGASTRODUODENOSCOPY      [place] LIGATN LONG SAPHENOUS VEIN AT [facility] Left 12/22/2016    Procedure: LIGATION/STRIPPING VEIN, LIGATION OF ANTERIOR TRIBUTARY GREATER SAPHENOUS VEIN BRANCH;  Surgeon: Agatha Kaplan DO;  Location: [facility];  Service: Vascular    MN PHLEB VEINS - EXTREM - TO 20 Left 2016    Procedure: MULTIPLE STAB PHLEBECTOMIES;  Surgeon: Jaime Jain DO;  [facility];  Service: Vascular    TUBAL LIGATION      VARICOSE VEIN SURGERY Left     Left leg    VEIN LIGATION Right 2017    Procedure: LIGATION GREATER SAPHENOUS VEIN TRIBUTARY WITH STAB PHLEBECTOMIES ;  Surgeon: Jaime Jain DO;  Location: [facility];  Service:        Family History   Problem Relation Age of Onset    Colon cancer Mother         ascending    Asthma Mother     Coronary artery disease Mother     Depression Mother     Diabetes Mother     Hypertension Mother     Lung cancer Mother     Breast cancer Mother     Varicose Veins Mother     Asthma Father     Hypertension Father     Lung cancer Father        Social History     Socioeconomic History    Marital status: Single     Spouse name: Not on file    Number of children: Not on file    Years of education: Not on file    Highest education level: Not on file   Occupational History    Not on file   Tobacco Use    Smoking status: Former Smoker     Packs/day: 0 50     Years: 10 00     Pack years: 5 00     Types: Cigarettes     Quit date: 2019     Years since quittin 9    Smokeless tobacco: Never Used   Vaping Use    Vaping Use: Never assessed   Substance and Sexual Activity    Alcohol use: Yes    Drug use: Never    Sexual activity: Not on file   Other Topics Concern    Not on file   Social History Narrative    Not on file     Social Determinants of Health     Financial Resource Strain:     Difficulty of Paying Living Expenses:    Food Insecurity:     Worried About Running Out of Food in the Last Year:     [address] in the Last Year:    Transportation Needs:     Lack of Transportation (Medical):      Lack of Transportation (Non-Medical):    Physical Activity:     Days of Exercise per Week:     Minutes of Exercise per Session:    Stress:     Feeling of Stress :    Social Connections:     Frequency of Communication with Friends and Family:     Frequency of Social Gatherings with Friends and Family:     Attends [employer] Services:     Active Member of Clubs or Organizations:     Attends Club or Organization Meetings:     Marital Status:    Intimate Partner Violence:     Fear of Current or Ex-Partner:     Emotionally Abused:     Physically Abused:     Sexually Abused:        No Known Allergies      Current Outpatient Medications:     albuterol (2 5 mg/3 mL) 0 083 % nebulizer solution, Take 1 vial (2 5 mg total) by nebulization every 6 (six) hours as needed for wheezing or shortness of breath, Disp: 10 vial, Rfl: 1    albuterol (PROVENTIL HFA,VENTOLIN HFA) 90 mcg/act inhaler, Inhale 1 puff every 6 (six) hours as needed for wheezing, Disp: 1 g, Rfl: 3    azelastine (ASTELIN) 0 1 % nasal spray, 1 spray into each nostril 2 (two) times a day Use in each nostril as directed, Disp: 1 Bottle, Rfl: 1    benzonatate (TESSALON) 200 MG capsule, Take 1 capsule (200 mg total) by mouth 3 (three) times a day as needed for cough, Disp: 20 capsule, Rfl: 3    buPROPion (WELLBUTRIN SR) 200 MG 12 hr tablet, , Disp: , Rfl:     Cholecalciferol (VITAMIN D) 50 MCG (2000 UT) tablet, Take 1 tablet (2,000 Units total) by mouth daily, Disp: 90 tablet, Rfl: 1    clonazePAM (KlonoPIN) 1 mg tablet, Take 1 tablet (1 mg total) by mouth 2 (two) times a day, Disp: 45 tablet, Rfl: 0    fexofenadine (ALLEGRA) 60 MG tablet, Take 1 tablet (60 mg total) by mouth daily, Disp: 30 tablet, Rfl: 1    fluticasone (FLONASE) 50 mcg/act nasal spray, SPRAY 1 SPRAY INTO EACH NOSTRIL EVERY DAY, Disp: 16 mL, Rfl: 3    fluticasone-salmeterol (Advair HFA) 45-21 MCG/ACT inhaler, Inhale 2 puffs 2 (two) times a day Rinse mouth after use, Disp: 1 Inhaler, Rfl: 3    gabapentin (NEURONTIN) 600 MG tablet, TAKE 1 TABLET BY MOUTH THREE TIMES A DAY, Disp: 90 tablet, Rfl: 3    hydrochlorothiazide (HYDRODIURIL) 25 mg tablet, Take 1 tablet (25 mg total) by mouth daily, Disp: 90 tablet, Rfl: 1    metFORMIN (GLUCOPHAGE) 1000 MG tablet, Take 1 tablet (1,000 mg total) by mouth 2 (two) times a day with meals, Disp: 180 tablet, Rfl: 0    methocarbamol (ROBAXIN) 750 mg tablet, Take 1 tablet (750 mg total) by mouth every 8 (eight) hours, Disp: 90 tablet, Rfl: 3    Misc   Devices (PULSE OXIMETER) MISC, by Does not apply route daily, Disp: 1 each, Rfl: 0    nystatin (MYCOSTATIN) 500,000 units/5 mL suspension, Apply 5 mL (500,000 Units total) to the mouth or throat 4 (four) times a day, Disp: 100 mL, Rfl: 0    omeprazole (PriLOSEC) 40 MG capsule, Take 1 capsule (40 mg total) by mouth daily before breakfast, Disp: 90 capsule, Rfl: 3    TRAZODONE HCL PO, Take 100 mg by mouth daily at bedtime, Disp: , Rfl:     diclofenac (VOLTAREN) 75 mg EC tablet, TAKE 1 TABLET BY MOUTH TWICE A DAY (Patient not taking: Reported on 7/21/2021), Disp: 60 tablet, Rfl: 1    ergocalciferol (VITAMIN D2) 50,000 units, Take 1 capsule (50,000 Units total) by mouth once a week (Patient not taking: Reported on 7/21/2021), Disp: 4 capsule, Rfl: 2    ergocalciferol (VITAMIN D2) 50,000 units, Take 1 capsule (50,000 Units total) by mouth once a week (Patient not taking: Reported on 7/21/2021), Disp: 4 capsule, Rfl: 2    phentermine 37 5 MG capsule, Take 1 capsule (37 5 mg total) by mouth every morning (Patient not taking: Reported on 7/21/2021), Disp: 30 capsule, Rfl: 1    simvastatin (ZOCOR) 40 mg tablet, TAKE 1 TABLET BY MOUTH DAILY AT BEDTIME (Patient not taking: Reported on 7/21/2021), Disp: 30 tablet, Rfl: 2    traMADol (ULTRAM) 50 mg tablet, Take 50 mg by mouth every 6 (six) hours as needed for moderate pain (Patient not taking: Reported on 7/21/2021), Disp: , Rfl:     Objective:    LLE    LLE    RLE    RLE    LLE      /84 (BP Location: Left arm, Patient Position: Sitting)   Pulse 75   Resp 18   Ht 5' 5" (1 651 m)   Wt 103 kg (226 lb)   BMI 37 61 kg/m²          Physical Exam  Vitals and nursing note reviewed  Constitutional:       General: She is not in acute distress  Appearance: She is well-developed  HENT:      Head: Normocephalic and atraumatic  Eyes:      General: No scleral icterus  Conjunctiva/sclera: Conjunctivae normal       Pupils: Pupils are equal, round, and reactive to light  Neck:      Thyroid: No thyromegaly  Vascular: No carotid bruit or JVD  Trachea: No tracheal deviation  Cardiovascular:      Rate and Rhythm: Normal rate and regular rhythm  Pulses:           Dorsalis pedis pulses are 2+ on the right side and 2+ on the left side  Heart sounds: S1 normal and S2 normal  No murmur heard  No friction rub  No gallop  No S3 sounds  Comments: Bilateral lower extremities warm, pink, motor and sensory intact and well perfused without cyanosis, pallor, rubor or tissue loss  Scattered reticular varicosities bilateral lower extremities  See Clinical image above  No lipodermatosclerosis or hemosiderin staining  Pulmonary:      Effort: No respiratory distress  Breath sounds: Normal breath sounds  No stridor  No wheezing, rhonchi or rales  Abdominal:      General: Bowel sounds are normal  There is no distension or abdominal bruit  Palpations: Abdomen is soft  There is no mass or pulsatile mass  Tenderness: There is no abdominal tenderness  There is no rebound  Musculoskeletal:         General: Normal range of motion  Cervical back: Normal range of motion and neck supple  Right lower le+ Edema present  Left lower le+ Edema present  Skin:     General: Skin is warm and dry  Coloration: Skin is not pale  Findings: No erythema or lesion  Neurological:      Mental Status: She is alert and oriented to person, place, and time     Psychiatric:         Mood and Affect: Mood normal          Thought Content:  Thought content normal

## 2021-07-22 ENCOUNTER — OFFICE VISIT (OUTPATIENT)
Dept: FAMILY MEDICINE CLINIC | Facility: CLINIC | Age: 46
End: 2021-07-22

## 2021-07-22 VITALS
BODY MASS INDEX: 37.61 KG/M2 | DIASTOLIC BLOOD PRESSURE: 82 MMHG | SYSTOLIC BLOOD PRESSURE: 124 MMHG | TEMPERATURE: 97.1 F | OXYGEN SATURATION: 99 % | HEIGHT: 65 IN | HEART RATE: 72 BPM | RESPIRATION RATE: 18 BRPM | WEIGHT: 225.7 LBS

## 2021-07-22 DIAGNOSIS — H91.91 HEARING LOSS OF RIGHT EAR, UNSPECIFIED HEARING LOSS TYPE: ICD-10-CM

## 2021-07-22 DIAGNOSIS — E11.9 NEW ONSET TYPE 2 DIABETES MELLITUS (HCC): Primary | ICD-10-CM

## 2021-07-22 LAB
CREAT UR-MCNC: 59 MG/DL
MICROALBUMIN UR-MCNC: 10.8 MG/L (ref 0–20)
MICROALBUMIN/CREAT 24H UR: 18 MG/G CREATININE (ref 0–30)

## 2021-07-22 PROCEDURE — T1015 CLINIC SERVICE: HCPCS | Performed by: PHYSICIAN ASSISTANT

## 2021-07-22 PROCEDURE — 82043 UR ALBUMIN QUANTITATIVE: CPT | Performed by: PHYSICIAN ASSISTANT

## 2021-07-22 PROCEDURE — 3074F SYST BP LT 130 MM HG: CPT | Performed by: PHYSICIAN ASSISTANT

## 2021-07-22 PROCEDURE — 3008F BODY MASS INDEX DOCD: CPT | Performed by: PHYSICIAN ASSISTANT

## 2021-07-22 PROCEDURE — 3061F NEG MICROALBUMINURIA REV: CPT | Performed by: PHYSICIAN ASSISTANT

## 2021-07-22 PROCEDURE — 3079F DIAST BP 80-89 MM HG: CPT | Performed by: PHYSICIAN ASSISTANT

## 2021-07-22 PROCEDURE — 99213 OFFICE O/P EST LOW 20 MIN: CPT | Performed by: PHYSICIAN ASSISTANT

## 2021-07-22 PROCEDURE — 82570 ASSAY OF URINE CREATININE: CPT | Performed by: PHYSICIAN ASSISTANT

## 2021-07-22 NOTE — ASSESSMENT & PLAN NOTE
Lab Results   Component Value Date    HGBA1C 12 7 (H) 06/24/2021    Blood work revealed elevated A1C of 12 7 which confirms diabetes  It was recommended to start a low sugar and low carbohydrate diet  Foods and drinks to avoid were discussed and dietary handouts were given  The importance of exercise was also discussed  Foot  exam were performed today in office  Macrovascular complications such as heart attack and stroke were discussed and so were increased risk of microvascular complications such as retinopathy, nephropathy, and neuropathy  In 2 months labs should be rechecked      Patient was also referred to eye specialist for diabetic retinopathy exam

## 2021-07-22 NOTE — PROGRESS NOTES
Assessment/Plan:    New onset type 2 diabetes mellitus ([facility] )    Lab Results   Component Value Date    HGBA1C 12 7 (H) 06/24/2021    Blood work revealed elevated A1C of 12 7 which confirms diabetes  It was recommended to start a low sugar and low carbohydrate diet  Foods and drinks to avoid were discussed and dietary handouts were given  The importance of exercise was also discussed  Foot  exam were performed today in office  Macrovascular complications such as heart attack and stroke were discussed and so were increased risk of microvascular complications such as retinopathy, nephropathy, and neuropathy  In 2 months labs should be rechecked  Patient was also referred to eye specialist for diabetic retinopathy exam           Problem List Items Addressed This Visit        Endocrine    New onset type 2 diabetes mellitus ([facility] ) - Primary       Lab Results   Component Value Date    HGBA1C 12 7 (H) 06/24/2021    Blood work revealed elevated A1C of 12 7 which confirms diabetes  It was recommended to start a low sugar and low carbohydrate diet  Foods and drinks to avoid were discussed and dietary handouts were given  The importance of exercise was also discussed  Foot  exam were performed today in office  Macrovascular complications such as heart attack and stroke were discussed and so were increased risk of microvascular complications such as retinopathy, nephropathy, and neuropathy  In 2 months labs should be rechecked  Patient was also referred to eye specialist for diabetic retinopathy exam           Relevant Orders    Ambulatory referral to Diabetic Education    Microalbumin / creatinine urine ratio    Glucometer test strips      Other Visit Diagnoses     Hearing loss of right ear, unspecified hearing loss type        Relevant Orders    Ambulatory referral to Audiology            Subjective:      Patient ID: Murphy Camacho is a 48 y o  female      HPI  48year old F here for follow up of bilateral leg pain  When she was last here labs were ordered and A1C was 12 7  She was recommended to start on metformin and change her diet  She bought glucomter and it ranges from 100-300  It is improving slowly  It started with 300 but is now not going into the 200s  The following portions of the patient's history were reviewed and updated as appropriate:   She  has a past medical history of Anemia, Anxiety, Arthritis, Asthma, Back pain, Carpal tunnel syndrome, Constipation, Depression, Environmental allergies, GERD (gastroesophageal reflux disease), H/O cardiac murmur, Insomnia, Kidney stone, Migraine, Psychiatric disorder, Pyelonephritis, and Wears glasses  She   Patient Active Problem List    Diagnosis Date Noted    New onset type 2 diabetes mellitus ([facility] ) 07/22/2021    Apnea     Restless leg syndrome     TERESA (obstructive sleep apnea)     Cough 08/26/2020    Environmental and seasonal allergies 08/26/2020    Vitamin D deficiency 08/14/2020    Prediabetes 08/14/2020    Recurrent major depressive disorder, in partial remission ([facility] ) 08/10/2020    COVID-19 05/26/2020    Hematuria 01/10/2020    Pain in both hands 07/16/2019    Leg swelling 07/16/2019    Morbid obesity ([facility] ) 07/16/2019    Sacroiliitis ([facility] ) 03/18/2019    Chronic pain syndrome 11/26/2018    Lumbar spondylosis 11/26/2018    Lumbar disc herniation 11/26/2018    Varicose veins of both lower extremities with pain 06/05/2017    Anxiety 10/11/2016    Moderate asthma 10/11/2016    Chronic bilateral low back pain with bilateral sciatica 10/11/2016    Gastroesophageal reflux disease 10/11/2016    Recurrent urinary tract infection 10/11/2016     She  has a past surgical history that includes Cholecystectomy; Varicose vein surgery (Left); Back surgery; pr phleb veins - extrem - to 20 (Left, 12/22/2016); pr ligatn long saphenous vein at [facility] (Left, 12/22/2016); Tubal ligation; Vein ligation (Right, 6/5/2017);  Cystoscopy; and Esophagogastroduodenoscopy  Her family history includes Asthma in her father and mother; Breast cancer in her mother; Colon cancer in her mother; Coronary artery disease in her mother; Depression in her mother; Diabetes in her mother; Hypertension in her father and mother; Lung cancer in her father and mother; Varicose Veins in her mother  She  reports that she quit smoking about 1 years ago  Her smoking use included cigarettes  She has a 5 00 pack-year smoking history  She has never used smokeless tobacco  She reports current alcohol use  She reports that she does not use drugs    Current Outpatient Medications   Medication Sig Dispense Refill    albuterol (2 5 mg/3 mL) 0 083 % nebulizer solution Take 1 vial (2 5 mg total) by nebulization every 6 (six) hours as needed for wheezing or shortness of breath 10 vial 1    albuterol (PROVENTIL HFA,VENTOLIN HFA) 90 mcg/act inhaler Inhale 1 puff every 6 (six) hours as needed for wheezing 1 g 3    azelastine (ASTELIN) 0 1 % nasal spray 1 spray into each nostril 2 (two) times a day Use in each nostril as directed 1 Bottle 1    benzonatate (TESSALON) 200 MG capsule Take 1 capsule (200 mg total) by mouth 3 (three) times a day as needed for cough 20 capsule 3    buPROPion (WELLBUTRIN SR) 200 MG 12 hr tablet       Cholecalciferol (VITAMIN D) 50 MCG (2000 UT) tablet Take 1 tablet (2,000 Units total) by mouth daily 90 tablet 1    clonazePAM (KlonoPIN) 1 mg tablet Take 1 tablet (1 mg total) by mouth 2 (two) times a day 45 tablet 0    fexofenadine (ALLEGRA) 60 MG tablet Take 1 tablet (60 mg total) by mouth daily 30 tablet 1    fluticasone (FLONASE) 50 mcg/act nasal spray SPRAY 1 SPRAY INTO EACH NOSTRIL EVERY DAY 16 mL 3    fluticasone-salmeterol (Advair HFA) 45-21 MCG/ACT inhaler Inhale 2 puffs 2 (two) times a day Rinse mouth after use 1 Inhaler 3    gabapentin (NEURONTIN) 600 MG tablet TAKE 1 TABLET BY MOUTH THREE TIMES A DAY 90 tablet 3    hydrochlorothiazide (HYDRODIURIL) 25 mg tablet Take 1 tablet (25 mg total) by mouth daily 90 tablet 1    metFORMIN (GLUCOPHAGE) 1000 MG tablet Take 1 tablet (1,000 mg total) by mouth 2 (two) times a day with meals 180 tablet 0    methocarbamol (ROBAXIN) 750 mg tablet Take 1 tablet (750 mg total) by mouth every 8 (eight) hours 90 tablet 3    Misc  Devices (PULSE OXIMETER) MISC by Does not apply route daily 1 each 0    nystatin (MYCOSTATIN) 500,000 units/5 mL suspension Apply 5 mL (500,000 Units total) to the mouth or throat 4 (four) times a day 100 mL 0    omeprazole (PriLOSEC) 40 MG capsule Take 1 capsule (40 mg total) by mouth daily before breakfast 90 capsule 3    TRAZODONE HCL PO Take 100 mg by mouth daily at bedtime      diclofenac (VOLTAREN) 75 mg EC tablet TAKE 1 TABLET BY MOUTH TWICE A DAY (Patient not taking: Reported on 7/21/2021) 60 tablet 1    ergocalciferol (VITAMIN D2) 50,000 units Take 1 capsule (50,000 Units total) by mouth once a week (Patient not taking: Reported on 7/21/2021) 4 capsule 2    ergocalciferol (VITAMIN D2) 50,000 units Take 1 capsule (50,000 Units total) by mouth once a week (Patient not taking: Reported on 7/21/2021) 4 capsule 2    phentermine 37 5 MG capsule Take 1 capsule (37 5 mg total) by mouth every morning (Patient not taking: Reported on 7/21/2021) 30 capsule 1    simvastatin (ZOCOR) 40 mg tablet TAKE 1 TABLET BY MOUTH DAILY AT BEDTIME (Patient not taking: Reported on 7/21/2021) 30 tablet 2    traMADol (ULTRAM) 50 mg tablet Take 50 mg by mouth every 6 (six) hours as needed for moderate pain (Patient not taking: Reported on 7/21/2021)       No current facility-administered medications for this visit       Current Outpatient Medications on File Prior to Visit   Medication Sig    albuterol (2 5 mg/3 mL) 0 083 % nebulizer solution Take 1 vial (2 5 mg total) by nebulization every 6 (six) hours as needed for wheezing or shortness of breath    albuterol (PROVENTIL HFA,VENTOLIN HFA) 90 mcg/act inhaler Inhale 1 puff every 6 (six) hours as needed for wheezing    azelastine (ASTELIN) 0 1 % nasal spray 1 spray into each nostril 2 (two) times a day Use in each nostril as directed    benzonatate (TESSALON) 200 MG capsule Take 1 capsule (200 mg total) by mouth 3 (three) times a day as needed for cough    buPROPion (WELLBUTRIN SR) 200 MG 12 hr tablet     Cholecalciferol (VITAMIN D) 50 MCG (2000 UT) tablet Take 1 tablet (2,000 Units total) by mouth daily    clonazePAM (KlonoPIN) 1 mg tablet Take 1 tablet (1 mg total) by mouth 2 (two) times a day    fexofenadine (ALLEGRA) 60 MG tablet Take 1 tablet (60 mg total) by mouth daily    fluticasone (FLONASE) 50 mcg/act nasal spray SPRAY 1 SPRAY INTO EACH NOSTRIL EVERY DAY    fluticasone-salmeterol (Advair HFA) 45-21 MCG/ACT inhaler Inhale 2 puffs 2 (two) times a day Rinse mouth after use    gabapentin (NEURONTIN) 600 MG tablet TAKE 1 TABLET BY MOUTH THREE TIMES A DAY    hydrochlorothiazide (HYDRODIURIL) 25 mg tablet Take 1 tablet (25 mg total) by mouth daily    metFORMIN (GLUCOPHAGE) 1000 MG tablet Take 1 tablet (1,000 mg total) by mouth 2 (two) times a day with meals    methocarbamol (ROBAXIN) 750 mg tablet Take 1 tablet (750 mg total) by mouth every 8 (eight) hours    Misc   Devices (PULSE OXIMETER) MISC by Does not apply route daily    nystatin (MYCOSTATIN) 500,000 units/5 mL suspension Apply 5 mL (500,000 Units total) to the mouth or throat 4 (four) times a day    omeprazole (PriLOSEC) 40 MG capsule Take 1 capsule (40 mg total) by mouth daily before breakfast    TRAZODONE HCL PO Take 100 mg by mouth daily at bedtime    diclofenac (VOLTAREN) 75 mg EC tablet TAKE 1 TABLET BY MOUTH TWICE A DAY (Patient not taking: Reported on 7/21/2021)    ergocalciferol (VITAMIN D2) 50,000 units Take 1 capsule (50,000 Units total) by mouth once a week (Patient not taking: Reported on 7/21/2021)    ergocalciferol (VITAMIN D2) 50,000 units Take 1 capsule (50,000 Units total) by mouth once a week (Patient not taking: Reported on 7/21/2021)    phentermine 37 5 MG capsule Take 1 capsule (37 5 mg total) by mouth every morning (Patient not taking: Reported on 7/21/2021)    simvastatin (ZOCOR) 40 mg tablet TAKE 1 TABLET BY MOUTH DAILY AT BEDTIME (Patient not taking: Reported on 7/21/2021)    traMADol (ULTRAM) 50 mg tablet Take 50 mg by mouth every 6 (six) hours as needed for moderate pain (Patient not taking: Reported on 7/21/2021)    [DISCONTINUED] gabapentin (NEURONTIN) 600 MG tablet Take 1 tablet (600 mg total) by mouth 3 (three) times a day     No current facility-administered medications on file prior to visit  She has No Known Allergies       Review of Systems   Constitutional: Negative for activity change, appetite change, chills, fatigue and unexpected weight change  HENT: Positive for hearing loss  Negative for dental problem, ear pain and sore throat  Eyes: Negative for visual disturbance  Respiratory: Negative for cough and wheezing  Cardiovascular: Positive for leg swelling  Negative for chest pain  Gastrointestinal: Negative for abdominal pain, constipation, diarrhea and vomiting  Genitourinary: Negative for difficulty urinating and dysuria  Musculoskeletal: Negative for arthralgias, back pain and myalgias  Skin: Negative for rash  Neurological: Positive for numbness  Negative for dizziness and headaches  Psychiatric/Behavioral: Negative for behavioral problems  Objective:      /82 (BP Location: Left arm, Patient Position: Sitting, Cuff Size: Standard)   Pulse 72   Temp (!) 97 1 °F (36 2 °C) (Temporal)   Resp 18   Ht 5' 5" (1 651 m)   Wt 102 kg (225 lb 11 2 oz)   SpO2 99%   Breastfeeding No   BMI 37 56 kg/m²          Physical Exam  Vitals and nursing note reviewed  Constitutional:       General: She is not in acute distress  Appearance: She is well-developed  HENT:      Head: Normocephalic and atraumatic  Right Ear: External ear normal       Left Ear: External ear normal    Eyes:      Conjunctiva/sclera: Conjunctivae normal    Neck:      Thyroid: No thyromegaly  Cardiovascular:      Rate and Rhythm: Normal rate and regular rhythm  Pulses:           Dorsalis pedis pulses are 2+ on the right side and 2+ on the left side  Posterior tibial pulses are 2+ on the right side and 2+ on the left side  Heart sounds: Normal heart sounds  No murmur heard  Comments: Varicose veins  Pulmonary:      Effort: Pulmonary effort is normal  No respiratory distress  Breath sounds: Normal breath sounds  No wheezing  Musculoskeletal:      Cervical back: Normal range of motion and neck supple  Feet:      Right foot:      Skin integrity: No ulcer, skin breakdown, erythema, warmth, callus or dry skin  Left foot:      Skin integrity: No ulcer, skin breakdown, erythema, warmth, callus or dry skin  Lymphadenopathy:      Cervical: No cervical adenopathy  Neurological:      Mental Status: She is alert and oriented to person, place, and time  Psychiatric:         Mood and Affect: Mood normal          Behavior: Behavior normal        Patient's shoes and socks were not removed  Right Foot/Ankle   Right Foot Inspection  Skin Exam: skin normal and skin intact no dry skin, no warmth, no callus, no erythema, no maceration, no abnormal color, no pre-ulcer, no ulcer and no callus                          Toe Exam: ROM and strength within normal limits  Sensory   Vibration: intact    Monofilament testing: intact  Vascular    The right DP pulse is 2+  The right PT pulse is 2+       Left Foot/Ankle  Left Foot Inspection  Skin Exam: skin normal and skin intactno dry skin, no warmth, no erythema, no maceration, normal color, no pre-ulcer, no ulcer and no callus                         Toe Exam: ROM and strength within normal limits                   Sensory   Vibration: intact    Monofilament: intact  Vascular    The left DP pulse is 2+  The left PT pulse is 2+  Assign Risk Category:  No deformity present;  No loss of protective sensation;        Risk: 0

## 2021-07-22 NOTE — PATIENT INSTRUCTIONS
Diabetes tipo 2 en adultos: [place] [facility]   LO QUE NECESITA SABER:   ¿Qué es la diabetes tipo 2? La diabetes tipo 2 es rah enfermedad que afecta el modo en que akbar organismo utiliza la glucosa (azúcar)  Generalmente, cuando el nivel de azúcar en la bo [place], el páncreas produce más insulina  La insulina ayuda al cuerpo a extraer el azúcar de la bo con el fin de usarla brittany luciano de Sindhu  La diabetes mellitus tipo 2 se desarrolla ya sea porque el cuerpo no puede producir suficiente insulina, o es incapaz de usarla adecuadamente  La diabetes tipo 2 puede ser controlada para evitar daño a akbar Debbie Slipper y otros órganos  ¿Qué aumenta mi riesgo de tener diabetes tipo 2? · Obesidad    · Inactividad física    · Edad avanzada    · Presión arterial jani o colesterol alto    · Antecedentes de enfermedad del corazón, diabetes gestacional o síndrome de ovario poliquístico    · Un miembro de akbar raffi tiene diabetes    · Ser afroamericano, , nativo americano, asiático-americano o de las nash del [facility]    ¿Cuáles son los signos y síntomas de la diabetes tipo 2? Usted podría niveles altos de azúcar en bo por mucho tiempo antes de que aparezcan los síntomas  Puede presentar cualquiera de los siguientes signos o síntomas:  · Adormecimiento en los dedos de la mano o el pie    · Visión borrosa    · Orina con frecuencia    · Más hambre o sed de lo usual    ¿Cómo se diagnostica la diabetes tipo 2? Es posible que necesite hacerse pruebas para detectar la diabetes tipo 2 a partir de los 37 años de [place]  Es posible que usted necesite alguno de los siguientes:  · Un análisis A1c muestra el promedio de la cantidad de azúcar en akbar bo en los últimos 2 a 3 meses  Akbar médico del equipo de cuidados de grace le indicará el nivel de A1C que es el adecuado para usted      · Un examen de glucosa en plasma en ayunas es cuando se analiza el nivel de azúcar en la bo después de anny ayunado por 8 horas     · Rah prueba de glucosa en plasma de 2 horas empieza cuando le revisan el nivel de azúcar en la bo después de anny ayunado por 8 horas  Después se le da rah bebida de glucosa  El nivel de azúcar en la bo se comprueba después de 2 horas  · Rah prueba de glucosa aleatoria puede hacerse a cualquier hora del día, independientemente de cuándo comió usted por última vez  ¿Cómo se trata la diabetes tipo 2? El [place] del tratamiento es prevenir o retrasar las complicaciones de la diabetes  Las complicaciones pueden incluir enfermedad cardíaca o renal  El tratamiento incluye comer alimentos saludables y [place]  Es posible que también necesite insulina u otros medicamentos para controlar andres niveles de azúcar en la bo  Es posible que necesite medicamentos para disminuir akbar riesgo de enfermedad cardíaca  Por ejemplo, medicamentos para bajar o controlar el colesterol  ¿Qué es la educación sobre la diabetes? La educación sobre la diabetes se iniciará inmediatamente  Los miembros del equipo de cuidado de la diabetes le enseñarán lo siguiente:  · Acerca de la nutrición: Un dietista le ayudará a diseñar un plan de alimentación para mantener estable akbar nivel de azúcar en la bo  Usted aprenderá cómo la comida afecta andres niveles de azúcar en la bo  Peggye Perone a realizar un seguimiento del azúcar y los alimentos que contienen almidón (carbohidratos)  No se salte ninguna comida  Akbar nivel de azúcar en la bo puede bajar demasiado si usted ha tomado medicamento para la diabetes y no ha comido  Es posible que le enseñen a utilizar el método del plato al comer  El método del plato ayuda a controlar las porciones  Con el método del plato, la mitad de akbar plato contiene vegetales  La otra mitad se divide para que un cuarto tenga carne o proteína y un cuarto tenga almidones, brittany las yovany           · Actividad física y diabetes: Usted aprenderá por qué la actividad física, brittany caminar, [place] importante  Usted y akbar médico del equipo de cuidados de grace harán un plan de akbar actividad  Akbar médico del equipo de cuidados de grace le indicará cuál es el peso saludable para usted  Lo ayudará a hacer un plan para llegar a aniyah peso y permanecer allí  Mantenga un peso saludable para ayudar a retrasar o prevenir las complicaciones de la diabetes  · [place] revisar akbar nivel de azúcar en la bo: Usted aprenderá lo que debe ser akbar nivel de azúcar en la bo  Se le dará información sobre cuándo comprobar akbar nivel de azúcar en la bo  Usted aprenderá qué hacer si akbar nivel de azúcar en la bo es demasiado alto o demasiado bajo  Anote los horarios de las comprobaciones y los niveles de azúcar en la bo  Lleve los registros a todas las citas de control  · Sobre los medicamentos para la diabetes: Es posible que le administren medicamentos orales para la diabetes para controlar akbar nivel de azúcar en la bo  Akbar médico le indicará cómo y cuándo debe asha andres medicamentos para la diabetes  También se le enseñarán los efectos secundarios que pueden causar los medicamentos orales para la diabetes  · Si necesita insulina: Se puede añadir insulina si los medicamentos orales para la diabetes pierden eficacia con el tiempo  A usted y akbar raffi les enseñarán cómo preparar y administrar la insulina  Aprenderá cuánta insulina necesita y en qué momento inyectarla  Se le enseñará cuándo no administrar la insulina  También se le enseñará lo que debe hacer si akbar nivel de azúcar en la bo baja demasiado  [place] puede suceder si usted norman insulina y no come la cantidad [place] de carbohidratos  El equipo de educación también le enseñará cómo desechar las agujas y De casey  ¿Qué más puedo hacer para manejar mi diabetes tipo 2?  · Hable con akbar equipo de atención médica si siente más estrés por akbar diagnóstico  Si se siente estresado por akbar diagnóstico, esto puede hacer que no se cuide adecuadamente   Zaina Carry de atención médica puede ayudarlo con consejos sobre el autocuidado  Akbar equipo de [facility] puede sugerirle que hable con un profesional de la grace mental  Brittany Hoes profesional puede escuchar y ofrecer ayuda en cuestiones de autocuidado  · Revise andres pies todos los días para nino si tienen llagas  Use zapatos y calcetines que le queden osbaldo  No se recorte las [employer]  Electa Dyke a un podólogo  Pregunte a akbar equipo de cuidados de grace por más información sobre el cuidado del pie  · No fume  La nicotina y otros químicos en los cigarrillos y puros pueden causar daño pulmonar y dificultar el manejo de la diabetes  Pida al médico de akbar equipo de cuidados de grace información si usted fuma actualmente y [employer] para dejar de hacerlo  Los cigarrillos electrónicos o el tabaco sin humo igualmente contienen nicotina  Consulte con akbar médico del equipo de cuidados de grace antes de utilizar estos productos  · Angle agua en lugar de bebidas azucaradas, brittany las bebidas gaseosas y los jugos de frutas  Las bebidas azucaradas provocan un alto nivel de azúcar en la bo y causan un aumento de [place]  Es posible que akbar médico le diga que las bebidas dietéticas no ayudan a perder peso  · Conozca los riesgos si decide beber alcohol  El alcohol puede causar que andres niveles de azúcar en la bo estén bajos si [place] insulina  El alcohol puede causar [address] de azúcar en la bo y [place] de peso si angle demasiado  Un trago equivale a 12 onzas de cerveza, 5 onzas de vino o 1 onza y ½ de licor  · Tómese la presión arterial antonieta brittany le indicaron  Si tiene diabetes tipo 2, puede tener presión arterial jani  Hable con akbar médico del equipo de cuidados de grace sobre andres metas de presión arterial  Juntos pueden crear un plan para bajar la presión arterial si es necesario y [place] en un rango saludable  El plan puede incluir cambios de estilo de caleb o medicamentos   Josephine Hausen presión arterial normal es 119/79 o inferior  Radha Pella arterial normal puede ayudar a prevenir o retrasar determinadas complicaciones de la diabetes  Por ejemplo, retinopatía (daño ocular) y daño renal          · Use identificación de alerta médica  Use un brazalete o collar de alerta médica o lleve consigo rah tarjeta que indique que tiene diabetes  Pregúntele al médico del equipo de cuidados de grace dónde puede conseguir estos productos  · [facility] vacunas  Usted corre un mayor riesgo de presentar enfermedades graves si usted contrae la gripe, neumonía o hepatitis  Pregúntele a akbar médico del equipo de cuidados de grace si usted debe ponerse la vacuna contra la gripe, neumonía o hepatitis B y cuándo debe vacunarse  ¿Cuáles son los riesgos de la diabetes tipo 2? La diabetes no controlada puede dañar los nervios, las venas y las arterias  El riesgo de demencia aumenta más rápidamente cuanto más tiempo no se controle la diabetes  Los [employer] de azúcar en la bo podrían dañar tejidos y órganos del cuerpo con el tiempo  El daño a las arterias [place] akbar riesgo de sufrir un ataque [employer] y un derrame cerebral  El daño a los nervios puede llevar a otros problemas del Buren Gurvinder y de los nervios  La diabetes es rah enfermedad potencialmente mortal si no recibe tratamiento  Pídale a alguien que llame al Virgia Eliane de emergencias local (911 en los [place]) si:  · Usted tiene alguno de los siguientes signos de derrame cerebral:      ? Adormecimiento o caída de un lado de akbar karolyn    ? Debilidad en un [place] o rah pierna    ? Confusión o debilidad para hablar    ? Mareos o dolor de carlos intenso, o pérdida de la visión  · Tiene alguno de los siguientes signos de un ataque cardíaco:      ? Estrujamiento, presión o tensión en akbar pecho    ?  Usted también podría presentar alguno de los siguientes:     § Malestar o dolor en akbar espalda, michelle, mandíbula, abdomen, o brazo    [address] o vómitos    § Desvanecimiento o sudor frío repentino    · Usted tiene dificultad para respirar  ¿Cuándo jerman buscar atención inmediata? · Usted tiene dificultad para respirar  · Usted tiene dolor abdominal intenso  · Vomita por más de 2 horas  · Usted tiene dificultad para permanecer despierto o concentrarse  · Usted está temblando o sudando  · Usted se siente débil o más cansado de lo habitual     · Usted tiene visión borrosa o doble  · Cortes aliento huele a fruta o venkat  ¿Cuándo jerman llamar al médico o a mi equipo de cuidados de grace? · Tiene hinchazón en los brazos y las piernas  · Tiene malestar estomacal y no puede ingerir los alimentos de cortes plan de comidas  · Usted tiene Chris, marilynn de [place] o se irrita con facilidad  · Cortes piel está lidya, tibia, seca o inflamada  · Usted tiene rah herida que no cicatriza  · Usted tiene entumecimiento en los brazos o piernas  · Usted tiene problemas para sobrellevar cortes enfermedad, o se siente ansioso o deprimido  · Usted tiene preguntas o inquietudes acerca de cortes condición o cuidado  ACUERDOS SOBRE CORTES CUIDADO:   Usted tiene el derecho de ayudar a planear cortes cuidado  Aprenda todo lo que pueda sobre cortes condición y brittany darle tratamiento  Discuta andres opciones de tratamiento con andres médicos para decidir el cuidado que usted desea recibir  Usted siempre tiene el derecho de rechazar el tratamiento  Esta información es sólo para uso en educación  Cortes intención no es darle un consejo médico sobre enfermedades o tratamientos  Colsulte con cortes Stormy Binder farmacéutico antes de seguir cualquier régimen médico para saber si es seguro y efectivo para usted  © Copyright [facility] 2021 Information is for End User's use only and may not be sold, redistributed or otherwise used for commercial purposes   All illustrations and images included in CareNotes® are the copyrighted property of A JAY A John RABAGO  or [facility]  Planificación de comidas con intercambios diabéticos   CUIDADO AMBULATORIO:   Los intercambios diabéticos son porciones de comida que contienen cantidades similares de carbohidratos, grasas, proteínas y calorías dentro de un moises alimenticio  Los intercambios pueden ser [employer] para desarrollar un plan alimenticio saludable que lo ayude a mantener el azúcar en la bo dentro de los niveles recomendados  Un plan alimenticio con la cantidad de carbohidratos correcta es especialmente importante  El azúcar en akbar bo se eleva naturalmente después de que usted consume carbohidratos  Consumir demasiados carbohidratos en 1 mau comida o merienda puede elevar akbar nivel de azúcar en la bo  Los carbohidratos se [employer], frutas, [place], yogeyad y Lashae  Llame a akbar médico si:  · Usted presenta niveles altos de azúcar en la bo a cierta hora del día o la mayor parte del [place]  · Usted tiene bajos niveles de azúcar en akbar bo frecuentemente  · Usted tiene preguntas o inquietudes acerca de akbar condición o cuidado  Maria Guadalupe un plan de alimentación con intercambios: Un dietista colaborará con usted para desarrollar un plan alimenticio saludable y adecuado  Leticia plan alimenticio incluirá la cantidad de intercambios que usted puede tener de cada moises alimenticio arleth el día  Siga el plan alimenticio manteniendo un registro de la cantidad de intercambios que usted come en cada comida y [place]  Akbar plan alimenticio será basado en akbar edad, peso, niveles de [employer], medicamentos y Brenda de [place]  Intercambios de grupos de alimentos con almidón: Cada intercambio en la lista, contiene aproximadamente 15 gramos de carbohidratos , 3 gramos de proteína, 1 gramo de [place] y [[id number]] calorías    · 1 onza de pan fischer, integral o de carter (1 [place])    · 1 onza de rosquilla (alrededor de 1/4 de la rosquilla)    · 1 tortilla de harina o de maíz de 6 pulgadas o 1 panqueque de 4 pulgadas (aproximadamente de ¼ de pulgada de grueso)    · ? de taza de pasta o arroz cocidos    · ¾ de taza de cereal seco, listo para comer y sin azúcar agregada    · ½ de taza de cereal cocido, brittany la jeronimo    · 3 galletas de harina cuadradas u 8 galletas en forma de animalitos    · 6 galletas saladas o    · 3 tazas de palomitas de maíz o ¾ onzas de pretzels    · Vegetales con almidón y legumbres cocidas:     ? ½ de taza de maíz, chícharos verdes, camote o puré de papa    ? ¼ de rah papa jamie horneada    ? [address], calabaza moscada, o calabaza    ? ½ de taza de frijoles, lentejas o ejote (brittany el shrestha, habichuela o de shawna nannette)    ? ? taza de frijol tipo lima    Intercambios del moises de las frutas: Cada intercambio contiene aproximadamente 15 gramos de carbohidratos  y 61 calorías  · 1 manzana, plátano, naranja o nectarina pequeña (4 onzas)    · ½ taza de fruta enlatada o fresca    · ½ taza (4 onzas) de jugo de fruta sin azúcar    · 2 cucharadas de fruta seca    Intercambios del moises de los lácteos: Cada intercambio contiene aproximadamente 12 gramos de carbohidratos  y 8 gramos de proteína  La cantidad de grasa y calorías en cada porción depende del tipo de lácteo (brittany entero, bajo en grasa o sin grasa)  · 1 taza de leche sin grasa o baja en grasa    · ¾ de taza de yogur natural sin grasa    · 1 taza de yogur sin grasa, endulzado con edulcorante artificial (sin calorías)    Intercambios de moises de verduras sin almidón: Cada intercambio contiene aproximadamente 5 gramos de carbohidratos , 2 gramos de proteína y 25 calorías  Los ejemplos incluyen al betabel, brócoli, repollo, zanahorias, coliflor, pepino, hongos, tomate y calabaza    · ½ de taza de verdura cocida o 1 taza de verdura cruda    · ½ taza de jugo de verduras    Intercambios del moises de la carne y sustituto de la carne: Cada intercambio de carne magra  de la siguiente lista contiene aproximadamente 7 gramos de proteína, de 0 a 3 gramos de grasa y 39 calorías  El moises de alimentos de la carne y los sustitutos de la carne no contienen carbohidratos  Las petra con medio o alto contenido de grasa contienen más calorías  · 1 onza de henry o pavo sin piel, o 1 onza de pescado (sin empanizar o freír)    · 1 onza de carne magra de res, puerco o martino    · 1 cubo de 1 pulgada o 1 onza de queso bajo en grasa    · 2 claras de huevo o ¼ de taza de sustituto de huevo    · ½ taza de tofú    Intercambios del moises de los Lash[place], postres y otros carbohidratos:  · Dulces y otros postres: Cada intercambio contiene aproximadamente 15 gramos de carbohidratos   ? 1 onza de pastel de rusty o [place] de 2 pulgadas de pastel (sin glaseado)    ? 2 galletas dulces pequeñas    ? ½ taza de helado, sin azúcar y [place]    ? 1 cucharada de Lety Rosa, [place], [place], azúcar de adame o miel    · Combinación de alimentos:     ? 1 taza de rah comida preparada, brittany la [employer], espaguetis con albóndigas, macarrones con Sid-barre, frijoles con chile (cada porción cuenta brtitany 2 intercambios de carbohidratos )    ? 1 taza de caldo de tomate o de vegetales con carne (cada porción cuenta brittany 1 intercambio de carbohidratos )    Intercambios del moises de las grasas: Cada intercambio contiene 5 gramos de grasa y 39 calorías    · 1 cucharadita de aceite (brittany el aceite de canola, judd y [place])    · 6 almendras o anacardos, 10 cacahuates o 4 mitades de nuez    · 2 cucharadas de aguacate    · ½ cucharada de crema de cacahuate    · 1 cucharadita de margarina regular o 2 cucharaditas de margarina baja en grasa    · 1 cucharadita de mantequilla regular o 1 cucharada de mantequilla baja en grasa    · 1 cucharadita de [place] regular o 1 cucharada mayonesa baja en grasa    · 1 cucharada de aderezo de ensalada regular o 2 cucharadas de aderezo para ensalada bajo en grasa    Alimentos de Cha elección: Los alimentos de esta lista se conocen brittany alimentos de [place] elección ya que tienen pocas calorías  Los alimentos de lorena elección usualmente no elevan el azúcar en ziegler bo si usted los limita  · 1 cucharada de salsa de tomate o salsa para taco    · ¼ taza de salsa    · 2 cucharadas de jarabe sin azúcar o 2 cucharaditas de mermelada o jalea baja en grasa    · 1 cucharada de aderezo para ensalada sin grasa    · 4 cucharadas de Tommie o [place] sin grasa    · Bebidas sin azúcar: soda de dieta, mezcla para bebidas sin azúcar o agua mineral    · Consomé bajo en sal o caldo sin grasa    · [place]    · Condimentos brittany especias, hierbas y ajo    · Citizen of the [place] sin azúcar sin fruta agregada    Otras pautas de nutrición saludable debería seguir:  · Limite las bebidas con sustitutos del azúcar  [place] dietista o profesional de la grace le recomendarán que samira agua  El agua ayuda a que los riñones funcionen correctamente  Consulte cuál es la cantidad de agua que usted debe asha por día  · [employer]  Elija alimentos que son buenas crump de fibra brittany frutas, vegetales y granos enteros  Los [employer] contienen 5 o más gramos de Lashae Hove por porción son [place] crump de Lashae Hove  Las legumbres brittany garbanzo, frijol shrestha, habichuela y [address] [place] [address] contienen [place]  · Limite la grasa  Pregunte a ziegler dietista o médico cuánta grasa usted debería consumir diariamente  Elija alimentos bajos en grasa, bajos en grasa saturada, grasas trans y colesterol  Delberta Olman son el pavo o henry sin la piel, pescado, cohn de carne que no contienen Sonya Flaco y frijoles  También, los productos lácteos que son bajos en grasa brittany Jayne Fleet y el yogur bajo en grasa y Jayne Fleet sin Maudie Maria Guadalupe son Alvera Denier  El ácido graso Omega 3 es grasa saludable que se encuentra en el aceite de canola, de soya y en el pescado con grasa  El salmón, el atún fischer y la lin son buenas crump de ácidos grasos omega 3  Consuma 2 porciones de estos tipos de pescado por semana   No coma pescado frito  · Limite el consumo de azúcar  Es necesario contar el azúcar y los dulces brittany parte de los intercambios de carbohidratos que usted puede tener en akbar plan alimenticio  Limite el azúcar y los dulces, porque usualmente son altos en calorías y [place]  Coma porciones más pequeñas de dulces compartiendo un postre o pidiendo rah porción para niños en el restaurante  · Limitar el sodio (sal) alrededor de 2,300 mg por día  Es posible que usted necesite consumir incluso menos sodio si padece de ciertas condiciones médicas  Los alimentos que contienen mucho sodio incluyen la salsa de soya, yovany fritas y caldos  · Limite el consumo de alcohol  Pregunte a akbar médico si usted puede asha alcohol  Si usted puede asha alcohol, es importante comer cuando lo tome  Si usted norman alcohol con akbar estómago vacío, akbar nivel de azúcar en la bo podría bajar demasiado  April Blend de [address] y los hombres de 72 años o más deben limitar el consumo de alcohol a 1 bebida por día  Los hombres de [address] de alcohol a 2 tragos al día  Rah bebida equivale a 5 onzas de vino, 12 onzas de cerveza o 1 onza y media de licor  Otras maneras de controlar akbar diabetes:  · Controle akbar nivel de azúcar en la bo  Revise akbar nivel de azúcar en la bo regularmente y [place] un registro de los [place]  Pregunte a akbar médico, cómo, cuándo y qué tan seguido necesita revisar el azúcar en akbar bo  Es posible que necesite controlarse el nivel de azúcar en akbar bo por lo menos 3 veces al día  · Hable con akbar médico sobre akbar peso  Pregúntele si debe adelgazar y cuánto peso debe perder  Si usted tiene sobrepeso, podría ser necesario hacer otros cambios para perder peso  Pida ayuda a akbar médico para crear un plan de pérdida de peso  · Realice actividad física regularmente  La actividad física puede ayudar a disminuir el nivel de azúcar en akbar bo   También puede ayudar a [place] riesgo de tener enfermedades cardíacas y ayudarle a perder peso  Los adultos deben realizar actividad física de intensidad moderada arleth al menos 150 minutos cada semana  Reparta la cantidad de actividad arleth al menos 3 días a la semana  No deje de realizarla arleth más de 2 días seguidos  Los niños deben hacer al menos 60 minutos de actividad física moderada la mayoría de los días de la [place]  Ejemplos de actividad física moderada incluyen caminar a paso ligero, correr y nadar  No se siente por más de 30 minutos  Colabore con akbar médico para crear un plan de [address] Bio  © Copyright [employer] 2021 Information is for End User's use only and may not be sold, redistributed or otherwise used for commercial purposes  All illustrations and images included in CareNotes® are the copyrighted property of A D A M , Inc  or [address] [place] [address] es sólo para uso en educación  Akbar intención no es darle un consejo médico sobre enfermedades o tratamientos  Colsulte con akbar Gina Sinanrs farmacéutico antes de seguir cualquier régimen médico para saber si es seguro y efectivo para usted

## 2021-07-23 ENCOUNTER — TELEPHONE (OUTPATIENT)
Dept: FAMILY MEDICINE CLINIC | Facility: CLINIC | Age: 46
End: 2021-07-23

## 2021-07-23 NOTE — TELEPHONE ENCOUNTER
Spoke with Michael Haider to schedule DSMES initial visit, 8/11 at 12 noon    She is also interested in Diabetes Care Program       Mary Roth, HANN, LDN

## 2021-07-28 ENCOUNTER — HOSPITAL ENCOUNTER (OUTPATIENT)
Dept: NON INVASIVE DIAGNOSTICS | Facility: HOSPITAL | Age: 46
Discharge: HOME | End: 2021-07-28
Payer: COMMERCIAL

## 2021-07-28 DIAGNOSIS — R07.9 CHEST PAIN, UNSPECIFIED TYPE: ICD-10-CM

## 2021-07-28 DIAGNOSIS — Z86.16 HISTORY OF COVID-19: ICD-10-CM

## 2021-07-28 PROCEDURE — 93350 STRESS TTE ONLY: CPT

## 2021-07-28 PROCEDURE — 93351 STRESS TTE COMPLETE: CPT | Performed by: INTERNAL MEDICINE

## 2021-08-10 ENCOUNTER — TELEPHONE (OUTPATIENT)
Dept: FAMILY MEDICINE CLINIC | Facility: CLINIC | Age: 46
End: 2021-08-10

## 2021-08-10 NOTE — TELEPHONE ENCOUNTER
Spoke with Romaine Shelby, confirmed appointment for 8/11 at 12 noon for diabetes education      Erasmo Mancilla, RDN, LDN

## 2021-08-11 ENCOUNTER — TELEPHONE (OUTPATIENT)
Dept: FAMILY MEDICINE CLINIC | Facility: CLINIC | Age: 46
End: 2021-08-11

## 2021-08-11 NOTE — TELEPHONE ENCOUNTER
Debbie Galindo called to cancel and reschedule today's appointment    She is now scheduled for 09/08 at 1 pm     Joselito Szymanski, JOSEP, LDN

## 2021-08-12 ENCOUNTER — OFFICE VISIT (OUTPATIENT)
Dept: AUDIOLOGY | Age: 46
End: 2021-08-12
Payer: COMMERCIAL

## 2021-08-12 DIAGNOSIS — H90.5 SENSORY HEARING LOSS, UNILATERAL: Primary | ICD-10-CM

## 2021-08-12 DIAGNOSIS — H91.91 HEARING LOSS OF RIGHT EAR, UNSPECIFIED HEARING LOSS TYPE: ICD-10-CM

## 2021-08-12 PROCEDURE — 92567 TYMPANOMETRY: CPT | Performed by: AUDIOLOGIST

## 2021-08-12 PROCEDURE — 92557 COMPREHENSIVE HEARING TEST: CPT | Performed by: AUDIOLOGIST

## 2021-08-12 NOTE — PROGRESS NOTES
HEARING EVALUATION    Name:  Dwaine Naidu  :  1975  Age:  48 y o  Date of Evaluation: 21     History: Tinnitus and Difficulty Understanding  Reason for visit: Dwaine Naidu is being seen today at the request of Dr Darshan Arevalo for an evaluation of hearing  A  was utilized for today's appointment  Patient reports subjective changes to her hearing sensitivities that she notes began ~5 months ago  Patient also reports right sided pulsatile tinnitus that also began ~5 months ago  Patient denies any recent ear infections or otalgia today  Patient does report occasional dizziness that she was unable to describe  EVALUATION:    Otoscopic Evaluation:   Right Ear: Clear and healthy ear canal and tympanic membrane   Left Ear: Clear and healthy ear canal and tympanic membrane    Tympanometry:   Right: Type A - normal middle ear pressure and compliance   Left: Type A - normal middle ear pressure and compliance    Audiogram Results:  Pure tone testing revealed a mild rising to normal sensorineural hearing loss in the  left  ear and a moderate rising to mild mixed hearing loss in the  right ear  SRT and PTA are in agreement indicating good test reliability  Word recognition scores were excellent bilaterally  *see attached audiogram      RECOMMENDATIONS:  Consult ENT and Copy to Patient/Caregiver    PATIENT EDUCATION:   Discussed results and recommendations with the patient at length  Strongly recommended patient follow-up with ENT for further evaluation of conductive components and unilateral pulsatile tinnitus  Questions were addressed and the patient was encouraged to contact our department should concerns arise        Gi Munoz , CCC-A  Clinical Audiologist

## 2021-08-18 ENCOUNTER — OFFICE VISIT (OUTPATIENT)
Dept: OBGYN CLINIC | Facility: MEDICAL CENTER | Age: 46
End: 2021-08-18
Payer: COMMERCIAL

## 2021-08-18 VITALS
HEART RATE: 71 BPM | WEIGHT: 225 LBS | HEIGHT: 65 IN | SYSTOLIC BLOOD PRESSURE: 133 MMHG | BODY MASS INDEX: 37.49 KG/M2 | RESPIRATION RATE: 20 BRPM | DIASTOLIC BLOOD PRESSURE: 82 MMHG

## 2021-08-18 DIAGNOSIS — G56.23 CUBITAL TUNNEL SYNDROME, BILATERAL: ICD-10-CM

## 2021-08-18 DIAGNOSIS — G56.03 BILATERAL CARPAL TUNNEL SYNDROME: Primary | ICD-10-CM

## 2021-08-18 DIAGNOSIS — M18.0 ARTHRITIS OF CARPOMETACARPAL (CMC) JOINT OF BOTH THUMBS: ICD-10-CM

## 2021-08-18 DIAGNOSIS — E11.9 TYPE 2 DIABETES MELLITUS WITHOUT COMPLICATION, WITHOUT LONG-TERM CURRENT USE OF INSULIN (HCC): ICD-10-CM

## 2021-08-18 PROCEDURE — 3079F DIAST BP 80-89 MM HG: CPT | Performed by: ORTHOPAEDIC SURGERY

## 2021-08-18 PROCEDURE — 99214 OFFICE O/P EST MOD 30 MIN: CPT | Performed by: ORTHOPAEDIC SURGERY

## 2021-08-18 PROCEDURE — 3075F SYST BP GE 130 - 139MM HG: CPT | Performed by: ORTHOPAEDIC SURGERY

## 2021-08-18 NOTE — PROGRESS NOTES
Chief Complaint     Bilateral hand pain and numbness      History of Present Illness     Harish Massey is a 48 y o  female who presents with continued bilateral hand pain and numbness  Pain is localized mostly to the thumb bases  She has tried the soft braces for use with activity but this does not help  She has taken ibuprofen as well  She also notes that she has numbness that persists in all fingers at night  It does not really occur much during the day  She has tried the the wrist splints at night as well  No catching clicking popping locking  Of note, patient is a diabetic and is seeing a dietitian next month    Past Medical History:   Diagnosis Date    Anemia     Anxiety     Arthritis     hands, knee    Asthma     uses inhalers for the same   No recnt flairups    Back pain     Carpal tunnel syndrome     Bilateral    Constipation     Depression     Environmental allergies     GERD (gastroesophageal reflux disease)     H/O cardiac murmur     Insomnia     Kidney stone     Migraine     Psychiatric disorder     anxiety    Pyelonephritis     Wears glasses        Past Surgical History:   Procedure Laterality Date    BACK SURGERY      States she isn't sure what she had done-possibly something to do with a lump or muscle    CHOLECYSTECTOMY      CYSTOSCOPY      onset: 6/1/16, resolved: 6/1/16    ESOPHAGOGASTRODUODENOSCOPY      WI LIGATN LONG SAPHENOUS VEIN AT [facility] Left 12/22/2016    Procedure: LIGATION/STRIPPING VEIN, LIGATION OF ANTERIOR TRIBUTARY GREATER SAPHENOUS VEIN BRANCH;  Surgeon: Fredo Sultana DO;  [facility];  Service: Vascular    WI PHLEB VEINS - EXTREM - TO 20 Left 12/22/2016    Procedure: MULTIPLE STAB PHLEBECTOMIES;  Surgeon: Fredo Sultana DO;  [facility];  Service: Vascular    TUBAL LIGATION      VARICOSE VEIN SURGERY Left     Left leg    VEIN LIGATION Right 6/5/2017    Procedure: LIGATION GREATER SAPHENOUS VEIN TRIBUTARY WITH STAB PHLEBECTOMIES ;  Surgeon: Della Singh DO;  [facility];  Service:        No Known Allergies    Current Outpatient Medications on File Prior to Visit   Medication Sig Dispense Refill    albuterol (2 5 mg/3 mL) 0 083 % nebulizer solution Take 1 vial (2 5 mg total) by nebulization every 6 (six) hours as needed for wheezing or shortness of breath 10 vial 1    albuterol (PROVENTIL HFA,VENTOLIN HFA) 90 mcg/act inhaler Inhale 1 puff every 6 (six) hours as needed for wheezing 1 g 3    azelastine (ASTELIN) 0 1 % nasal spray 1 spray into each nostril 2 (two) times a day Use in each nostril as directed 1 Bottle 1    benzonatate (TESSALON) 200 MG capsule Take 1 capsule (200 mg total) by mouth 3 (three) times a day as needed for cough 20 capsule 3    buPROPion (WELLBUTRIN SR) 200 MG 12 hr tablet       Cholecalciferol (VITAMIN D) 50 MCG (2000 UT) tablet Take 1 tablet (2,000 Units total) by mouth daily 90 tablet 1    clonazePAM (KlonoPIN) 1 mg tablet Take 1 tablet (1 mg total) by mouth 2 (two) times a day 45 tablet 0    diclofenac (VOLTAREN) 75 mg EC tablet TAKE 1 TABLET BY MOUTH TWICE A DAY (Patient not taking: Reported on 7/21/2021) 60 tablet 1    ergocalciferol (VITAMIN D2) 50,000 units Take 1 capsule (50,000 Units total) by mouth once a week (Patient not taking: Reported on 7/21/2021) 4 capsule 2    ergocalciferol (VITAMIN D2) 50,000 units Take 1 capsule (50,000 Units total) by mouth once a week (Patient not taking: Reported on 7/21/2021) 4 capsule 2    fexofenadine (ALLEGRA) 60 MG tablet Take 1 tablet (60 mg total) by mouth daily 30 tablet 1    fluticasone (FLONASE) 50 mcg/act nasal spray SPRAY 1 SPRAY INTO EACH NOSTRIL EVERY DAY 16 mL 3    fluticasone-salmeterol (Advair HFA) 45-21 MCG/ACT inhaler Inhale 2 puffs 2 (two) times a day Rinse mouth after use 1 Inhaler 3    gabapentin (NEURONTIN) 600 MG tablet TAKE 1 TABLET BY MOUTH THREE TIMES A DAY 90 tablet 3    hydrochlorothiazide (HYDRODIURIL) 25 mg tablet Take 1 tablet (25 mg total) by mouth daily 90 tablet 1    metFORMIN (GLUCOPHAGE) 1000 MG tablet Take 1 tablet (1,000 mg total) by mouth 2 (two) times a day with meals 180 tablet 0    methocarbamol (ROBAXIN) 750 mg tablet Take 1 tablet (750 mg total) by mouth every 8 (eight) hours 90 tablet 3    Misc  Devices (PULSE OXIMETER) MISC by Does not apply route daily 1 each 0    nystatin (MYCOSTATIN) 500,000 units/5 mL suspension Apply 5 mL (500,000 Units total) to the mouth or throat 4 (four) times a day 100 mL 0    omeprazole (PriLOSEC) 40 MG capsule Take 1 capsule (40 mg total) by mouth daily before breakfast 90 capsule 3    phentermine 37 5 MG capsule Take 1 capsule (37 5 mg total) by mouth every morning (Patient not taking: Reported on 2021) 30 capsule 1    simvastatin (ZOCOR) 40 mg tablet TAKE 1 TABLET BY MOUTH DAILY AT BEDTIME (Patient not taking: Reported on 2021) 30 tablet 2    traMADol (ULTRAM) 50 mg tablet Take 50 mg by mouth every 6 (six) hours as needed for moderate pain (Patient not taking: Reported on 2021)      TRAZODONE HCL PO Take 100 mg by mouth daily at bedtime      [DISCONTINUED] gabapentin (NEURONTIN) 600 MG tablet Take 1 tablet (600 mg total) by mouth 3 (three) times a day 90 tablet 3     No current facility-administered medications on file prior to visit  Social History     Tobacco Use    Smoking status: Former Smoker     Packs/day: 0 50     Years: 10 00     Pack years: 5 00     Types: Cigarettes     Quit date: 2019     Years since quittin 0    Smokeless tobacco: Never Used   Vaping Use    Vaping Use: Never assessed   Substance Use Topics    Alcohol use:  Yes    Drug use: Never       Family History   Problem Relation Age of Onset    Colon cancer Mother         ascending    Asthma Mother     Coronary artery disease Mother     Depression Mother     Diabetes Mother     Hypertension Mother     Lung cancer Mother     Breast cancer Mother     Varicose Veins Mother     Asthma Father     Hypertension Father     Lung cancer Father        Review of Systems     As stated in the HPI  All other systems were reviewed and are negative  Physical Exam     /82   Pulse 71   Resp 20   Ht 5' 5" (1 651 m)   Wt 102 kg (225 lb)   BMI 37 44 kg/m²     GENERAL: This is a well-developed, well-nourished, age-appropriate patient in no acute distress  The patient is alert and oriented x3  Pleasant and cooperative  Eyes: Anicteric sclerae  Extraocular movements appear intact  HENT: Nares are patent with no drainage  Lungs: There is equal chest rise on inspection  Breathing is non-labored with no audible wheezing  Cardiovascular: No cyanosis  No upper extremity lymphadema  Skin: Skin is warm to touch  No obvious skin lesions or rashes other than described below  Neurologic: No ataxia  Psychiatric: Mood and affect are appropriate  Examination of the bilateral upper extremities reveals no masses lesions or deformities  No atrophy  Full range of motion about the elbows forearms wrists and fingers with YOGASMOGA [facility] 0 5/5 Motor to the APB, FDI, FDP2, FDP5, EDC  Sensation intact to light touch in the median, radial, and ulnar nerve distribution  Positive Tinel's left carpal tunnel but otherwise negative in the right carpal tunnel and bilateral cubital tunnels  Negative flexion compression test bilaterally  Positive Durkan's compression test bilaterally  Tenderness to palpation left greater than right thumb CMC    Bilateral positive thumb adduction test   Negative first dorsal compartment tenderness and negative Finkelstein test      Data Review     Labs:  12 7 hemoglobin A1c    Electrodiagnostic Testing:  None today    Imaging:  None today    Assessment and Plan      Diagnoses and all orders for this visit:    Bilateral carpal tunnel syndrome    Arthritis of carpometacarpal (CMC) joint of both thumbs    Type 2 diabetes mellitus without complication, without long-term current use of insulin [facility])         47-year-old female with bilateral thumb CMC pain as well as presumed carpal tunnel syndrome  I discussed with her that her diagnosis is fairly clear on exam though given the fact that her small finger appears to be involved, I have recommended ultrasounds of the bilateral cubital tunnel and carpal tunnels to help rule out cubital tunnel syndrome  With her thumb CMC pain, I recommended injection especially to the left side however her hemoglobin A1c is 12 7  I had a long discussion regarding risks of this as well as her diabetes and her care related to this  Her A1c has been more than doubled since a previous exam and I have recommended that she see a dietitian and talk to her primary care provider about her sugars  She actually has an appointment next week with a dietitian and will discuss medication adjustments with her PCP  We will reserve an injection until after this is done    Patient will follow up in about 2 months after controlling her diabetes better and having a potential recheck        Follow Up:  2 months    To Do Next Visit:  Consideration for CMC injections, review ultrasound    PROCEDURES PERFORMED:  Procedures  No Procedures performed today

## 2021-09-07 ENCOUNTER — TELEPHONE (OUTPATIENT)
Dept: FAMILY MEDICINE CLINIC | Facility: CLINIC | Age: 46
End: 2021-09-07

## 2021-09-07 NOTE — TELEPHONE ENCOUNTER
Spoke with patient, explained that tomorrow's appointment for DM education is canceled  Explained that Nathan Hopes will call patient back to re-schedule  Patient verbalized understanding    Bhavya Chau, RDN, LDN, [facility]

## 2021-09-16 ENCOUNTER — OFFICE VISIT (OUTPATIENT)
Dept: OTOLARYNGOLOGY | Facility: CLINIC | Age: 46
End: 2021-09-16
Payer: COMMERCIAL

## 2021-09-16 VITALS — WEIGHT: 222 LBS | BODY MASS INDEX: 36.99 KG/M2 | HEIGHT: 65 IN

## 2021-09-16 DIAGNOSIS — H91.93 BILATERAL HEARING LOSS, UNSPECIFIED HEARING LOSS TYPE: ICD-10-CM

## 2021-09-16 DIAGNOSIS — H90.6 MIXED HEARING LOSS, BILATERAL: Primary | ICD-10-CM

## 2021-09-16 PROCEDURE — 99243 OFF/OP CNSLTJ NEW/EST LOW 30: CPT | Performed by: OTOLARYNGOLOGY

## 2021-09-16 NOTE — PROGRESS NOTES
Consultation - Otolaryngology - Head and Neck Surgery  Facial Plastic and Reconstructive Surgery  [facility] 48 y o  female MRN: [id number]  Encounter: [id number]        Assessment/Plan:  1  Mixed hearing loss, bilateral     2  Bilateral hearing loss, unspecified hearing loss type  Ambulatory Referral to Otolaryngology       Reviewed audiogram from [place] [id number] indicating bilateral mixed hearing loss  Larger conductive loss on R  Symmetric nerve function bilateral     Appears to have Carhart notch in R at 2k Hz  On exam - 512 Hz tuning fork lateralizes R  BC > AC  Findings suggestive of otosclerosis  We discussed options for further evaluation/management  Options include observation, CT temporal bones, middle ear exploration, stapedectomy, or hearing aids  She would like to first pursue hearing aids  F/u with [place] [id number] for HAE  F/u here in 6 months for re evaluation, sooner prn  History of Present Illness   Physician Requesting Consult: Shani Nuñez PA-C  Reason for Consult / Principal Problem: hearing loss  HPI: [facility] is a 48y o  year old female who presents with bilateral hearing loss  Translating through [place]  Subjectively has noticed gradual decline in hearing started around 6 months ago  Feels worse on right  Had hearing test at [place] [id number]  Reports family history of hearing loss in her aunt  No recurrent ear infections or prior ear surgeries  No head trauma  Review of systems:  ROS was performed by the MA and documented in the attached note  This was reviewed personally  Historical Information   Past Medical History:   Diagnosis Date    Anemia     Anxiety     Arthritis     hands, knee    Asthma     uses inhalers for the same   No recnt flairups    Back pain     Carpal tunnel syndrome     Bilateral    Constipation     Depression     Environmental allergies     GERD (gastroesophageal reflux disease)     H/O cardiac murmur     Insomnia  Kidney stone     Migraine     Psychiatric disorder     anxiety    Pyelonephritis     Wears glasses      Past Surgical History:   Procedure Laterality Date    BACK SURGERY      States she isn't sure what she had done-possibly something to do with a lump or muscle    CHOLECYSTECTOMY      CYSTOSCOPY      onset: 16, resolved: 16    ESOPHAGOGASTRODUODENOSCOPY      [place] LIGATN LONG SAPHENOUS VEIN AT [facility] Left 2016    Procedure: LIGATION/STRIPPING VEIN, LIGATION OF ANTERIOR TRIBUTARY GREATER SAPHENOUS VEIN BRANCH;  Surgeon: Monty Ryan DO;  [facility];  Service: Vascular    HI PHLEB VEINS - EXTREM - TO 20 Left 2016    Procedure: MULTIPLE STAB PHLEBECTOMIES;  Surgeon: Monty Ryan DO;  [facility];  Service: Vascular    TUBAL LIGATION      VARICOSE VEIN SURGERY Left     Left leg    VEIN LIGATION Right 2017    Procedure: LIGATION GREATER SAPHENOUS VEIN TRIBUTARY WITH STAB PHLEBECTOMIES ;  Surgeon: Monty Ryan DO;  [facility];  Service:      Social History   Social History     Substance and Sexual Activity   Alcohol Use Yes     Social History     Substance and Sexual Activity   Drug Use Never     Social History     Tobacco Use   Smoking Status Former Smoker    Packs/day: 0 50    Years: 10 00    Pack years: 5 00    Types: Cigarettes    Quit date: 2019    Years since quittin 1   Smokeless Tobacco Never Used     Family History:   Family History   Problem Relation Age of Onset    Colon cancer Mother         ascending    Asthma Mother     Coronary artery disease Mother     Depression Mother     Diabetes Mother     Hypertension Mother     Lung cancer Mother     Breast cancer Mother     Varicose Veins Mother     Asthma Father     Hypertension Father     Lung cancer Father        Current Outpatient Medications on File Prior to Visit   Medication Sig    albuterol (2 5 mg/3 mL) 0 083 % nebulizer solution Take 1 vial (2 5 mg total) by nebulization every 6 (six) hours as needed for wheezing or shortness of breath    albuterol (PROVENTIL HFA,VENTOLIN HFA) 90 mcg/act inhaler Inhale 1 puff every 6 (six) hours as needed for wheezing    azelastine (ASTELIN) 0 1 % nasal spray 1 spray into each nostril 2 (two) times a day Use in each nostril as directed    benzonatate (TESSALON) 200 MG capsule Take 1 capsule (200 mg total) by mouth 3 (three) times a day as needed for cough    buPROPion (WELLBUTRIN SR) 200 MG 12 hr tablet     Cholecalciferol (VITAMIN D) 50 MCG (2000 UT) tablet Take 1 tablet (2,000 Units total) by mouth daily    clonazePAM (KlonoPIN) 1 mg tablet Take 1 tablet (1 mg total) by mouth 2 (two) times a day    fexofenadine (ALLEGRA) 60 MG tablet Take 1 tablet (60 mg total) by mouth daily    fluticasone (FLONASE) 50 mcg/act nasal spray SPRAY 1 SPRAY INTO EACH NOSTRIL EVERY DAY    fluticasone-salmeterol (Advair HFA) 45-21 MCG/ACT inhaler Inhale 2 puffs 2 (two) times a day Rinse mouth after use    gabapentin (NEURONTIN) 600 MG tablet TAKE 1 TABLET BY MOUTH THREE TIMES A DAY    hydrochlorothiazide (HYDRODIURIL) 25 mg tablet Take 1 tablet (25 mg total) by mouth daily    metFORMIN (GLUCOPHAGE) 1000 MG tablet Take 1 tablet (1,000 mg total) by mouth 2 (two) times a day with meals    methocarbamol (ROBAXIN) 750 mg tablet Take 1 tablet (750 mg total) by mouth every 8 (eight) hours    Misc   Devices (PULSE OXIMETER) MISC by Does not apply route daily    nystatin (MYCOSTATIN) 500,000 units/5 mL suspension Apply 5 mL (500,000 Units total) to the mouth or throat 4 (four) times a day    omeprazole (PriLOSEC) 40 MG capsule Take 1 capsule (40 mg total) by mouth daily before breakfast    TRAZODONE HCL PO Take 100 mg by mouth daily at bedtime    diclofenac (VOLTAREN) 75 mg EC tablet TAKE 1 TABLET BY MOUTH TWICE A DAY (Patient not taking: Reported on 7/21/2021)    ergocalciferol (VITAMIN D2) 50,000 units Take 1 capsule (50,000 Units total) by mouth once a week (Patient not taking: Reported on 7/21/2021)    ergocalciferol (VITAMIN D2) 50,000 units Take 1 capsule (50,000 Units total) by mouth once a week (Patient not taking: Reported on 7/21/2021)    phentermine 37 5 MG capsule Take 1 capsule (37 5 mg total) by mouth every morning (Patient not taking: Reported on 7/21/2021)    simvastatin (ZOCOR) 40 mg tablet TAKE 1 TABLET BY MOUTH DAILY AT BEDTIME (Patient not taking: Reported on 7/21/2021)    traMADol (ULTRAM) 50 mg tablet Take 50 mg by mouth every 6 (six) hours as needed for moderate pain (Patient not taking: Reported on 7/21/2021)    [DISCONTINUED] gabapentin (NEURONTIN) 600 MG tablet Take 1 tablet (600 mg total) by mouth 3 (three) times a day     No current facility-administered medications on file prior to visit  No Known Allergies    There were no vitals filed for this visit  Physical Exam   Constitutional: Oriented to person, place, and time  Well-developed and well-nourished, no apparent distress, non-toxic appearance  Cooperative, able to hear and answer questions without difficulty  Voice: Normal voice quality  Head: Normocephalic, atraumatic  No scars, masses or lesions  Face: Symmetric, no edema, no sinus tenderness  Eyes: Vision grossly intact, extra-ocular movement intact  Ears: External ears normal  Tympanic membranes intact with intact normal landmarks  No post-auricular erythema or tenderness  Nose: Septum intact, nares clear  Mucosa moist, turbinates well appearing  No crusting, polyps or discharge evident  Oral cavity: Dentition intact  Mucosa moist, lips without lesions or masses  Tongue mobile, floor of mouth soft and flat  Hard palate intact  No masses or lesions  Oropharynx: Uvula is midline, soft palate intact without lesion or mass  Oropharyngeal inlet without obstruction  Tonsils unremarkable  Posterior pharyngeal wall clear  No masses or lesions    Salivary glands: Parotid glands and submandibular glands symmetric, no enlargement or tenderness  Neck: Normal laryngeal elevation with swallow  Trachea midline  No masses or lesions  No palpable adenopathy  Thyroid: Without tenderness or palpable nodules  Pulmonary/Chest: Normal effort and rate  No respiratory distress  No stertor or stridor  Musculoskeletal: Normal range of motion  Neurological: Cranial nerves 2-12 intact  Skin: Skin is warm and dry  Psychiatric: Normal mood and affect  Imaging Studies: I have personally reviewed pertinent reports  Lab Results: I have personally reviewed pertinent lab results

## 2021-09-21 ENCOUNTER — HOSPITAL ENCOUNTER (OUTPATIENT)
Dept: NON INVASIVE DIAGNOSTICS | Facility: CLINIC | Age: 46
Discharge: HOME | End: 2021-09-21
Payer: COMMERCIAL

## 2021-09-21 DIAGNOSIS — I83.813 VARICOSE VEINS OF BOTH LOWER EXTREMITIES WITH PAIN: ICD-10-CM

## 2021-09-21 DIAGNOSIS — E11.9 NEW ONSET TYPE 2 DIABETES MELLITUS (HCC): ICD-10-CM

## 2021-09-21 PROCEDURE — 93970 EXTREMITY STUDY: CPT

## 2021-09-21 PROCEDURE — 93970 EXTREMITY STUDY: CPT | Performed by: SURGERY

## 2021-09-24 ENCOUNTER — HOSPITAL ENCOUNTER (OUTPATIENT)
Dept: MAMMOGRAPHY | Facility: CLINIC | Age: 46
Discharge: HOME | End: 2021-09-24
Payer: COMMERCIAL

## 2021-09-24 VITALS — WEIGHT: 222 LBS | HEIGHT: 65 IN | BODY MASS INDEX: 36.99 KG/M2

## 2021-09-24 DIAGNOSIS — Z12.31 BREAST CANCER SCREENING BY MAMMOGRAM: ICD-10-CM

## 2021-09-24 PROCEDURE — 77063 BREAST TOMOSYNTHESIS BI: CPT

## 2021-09-24 PROCEDURE — 77067 SCR MAMMO BI INCL CAD: CPT

## 2021-09-27 ENCOUNTER — TELEPHONE (OUTPATIENT)
Dept: FAMILY MEDICINE CLINIC | Facility: CLINIC | Age: 46
End: 2021-09-27

## 2021-10-12 ENCOUNTER — TELEPHONE (OUTPATIENT)
Dept: VASCULAR SURGERY | Facility: CLINIC | Age: 46
End: 2021-10-12

## 2021-10-12 ENCOUNTER — OFFICE VISIT (OUTPATIENT)
Dept: VASCULAR SURGERY | Facility: CLINIC | Age: 46
End: 2021-10-12
Payer: COMMERCIAL

## 2021-10-12 VITALS
BODY MASS INDEX: 37.15 KG/M2 | SYSTOLIC BLOOD PRESSURE: 130 MMHG | HEIGHT: 65 IN | DIASTOLIC BLOOD PRESSURE: 90 MMHG | WEIGHT: 223 LBS | HEART RATE: 71 BPM

## 2021-10-12 DIAGNOSIS — I83.813 VARICOSE VEINS OF BOTH LOWER EXTREMITIES WITH PAIN: Primary | ICD-10-CM

## 2021-10-12 PROCEDURE — 99214 OFFICE O/P EST MOD 30 MIN: CPT | Performed by: SURGERY

## 2021-10-12 PROCEDURE — 3080F DIAST BP >= 90 MM HG: CPT | Performed by: SURGERY

## 2021-10-12 PROCEDURE — 3075F SYST BP GE 130 - 139MM HG: CPT | Performed by: SURGERY

## 2021-10-12 RX ORDER — CHLORHEXIDINE GLUCONATE 0.12 MG/ML
15 RINSE ORAL ONCE
Status: CANCELLED | OUTPATIENT
Start: 2021-10-12 | End: 2021-10-12

## 2021-10-12 RX ORDER — CHLORHEXIDINE GLUCONATE 0.12 MG/ML
15 RINSE ORAL EVERY 12 HOURS SCHEDULED
Status: CANCELLED | OUTPATIENT
Start: 2021-10-12

## 2021-10-12 RX ORDER — CEFAZOLIN SODIUM 1 G/50ML
1000 SOLUTION INTRAVENOUS ONCE
Status: CANCELLED | OUTPATIENT
Start: 2021-10-12 | End: 2021-10-12

## 2021-10-22 DIAGNOSIS — J45.40 MODERATE PERSISTENT ASTHMA WITHOUT COMPLICATION: ICD-10-CM

## 2021-10-22 RX ORDER — ALBUTEROL SULFATE 2.5 MG/3ML
2.5 SOLUTION RESPIRATORY (INHALATION) EVERY 6 HOURS PRN
Qty: 30 ML | Refills: 1 | Status: SHIPPED | OUTPATIENT
Start: 2021-10-22

## 2021-10-25 ENCOUNTER — ANESTHESIA EVENT (OUTPATIENT)
Dept: PERIOP | Facility: AMBULARY SURGERY CENTER | Age: 46
End: 2021-10-25
Payer: COMMERCIAL

## 2021-11-01 ENCOUNTER — APPOINTMENT (OUTPATIENT)
Dept: LAB | Facility: HOSPITAL | Age: 46
End: 2021-11-01
Attending: SURGERY
Payer: COMMERCIAL

## 2021-11-01 DIAGNOSIS — I83.813 VARICOSE VEINS OF BOTH LOWER EXTREMITIES WITH PAIN: ICD-10-CM

## 2021-11-01 LAB
ANION GAP SERPL CALCULATED.3IONS-SCNC: 4 MMOL/L (ref 5–14)
BUN SERPL-MCNC: 12 MG/DL (ref 5–25)
CALCIUM SERPL-MCNC: 9.4 MG/DL (ref 8.4–10.2)
CHLORIDE SERPL-SCNC: 105 MMOL/L (ref 97–108)
CO2 SERPL-SCNC: 29 MMOL/L (ref 22–30)
CREAT SERPL-MCNC: 0.62 MG/DL (ref 0.6–1.2)
ERYTHROCYTE [DISTWIDTH] IN BLOOD BY AUTOMATED COUNT: 12.5 %
GFR SERPL CREATININE-BSD FRML MDRD: 107 ML/MIN/1.73SQ M
GLUCOSE P FAST SERPL-MCNC: 118 MG/DL (ref 70–99)
HCT VFR BLD AUTO: 38.9 % (ref 36–46)
HGB BLD-MCNC: 13.2 G/DL (ref 12–16)
MCH RBC QN AUTO: 32.7 PG (ref 26–34)
MCHC RBC AUTO-ENTMCNC: 34 G/DL (ref 31–36)
MCV RBC AUTO: 96 FL (ref 80–100)
PLATELET # BLD AUTO: 181 THOUSANDS/UL (ref 150–450)
PMV BLD AUTO: 8.5 FL (ref 8.9–12.7)
POTASSIUM SERPL-SCNC: 4.2 MMOL/L (ref 3.6–5)
RBC # BLD AUTO: 4.04 MILLION/UL (ref 4–5.2)
SODIUM SERPL-SCNC: 138 MMOL/L (ref 137–147)
WBC # BLD AUTO: 7 THOUSAND/UL (ref 4.5–11)

## 2021-11-01 PROCEDURE — 85027 COMPLETE CBC AUTOMATED: CPT

## 2021-11-01 PROCEDURE — 36415 COLL VENOUS BLD VENIPUNCTURE: CPT

## 2021-11-01 PROCEDURE — 80048 BASIC METABOLIC PNL TOTAL CA: CPT

## 2021-11-03 RX ORDER — CHOLECALCIFEROL (VITAMIN D3) 125 MCG
CAPSULE ORAL
COMMUNITY

## 2021-11-03 RX ORDER — ASPIRIN 81 MG/1
81 TABLET, CHEWABLE ORAL DAILY
COMMUNITY

## 2021-11-04 PROBLEM — E66.9 OBESITY (BMI 35.0-39.9 WITHOUT COMORBIDITY): Status: ACTIVE | Noted: 2021-11-04

## 2021-11-05 ENCOUNTER — HOSPITAL ENCOUNTER (OUTPATIENT)
Facility: AMBULARY SURGERY CENTER | Age: 46
Setting detail: OUTPATIENT SURGERY
Discharge: HOME | End: 2021-11-05
Attending: SURGERY | Admitting: SURGERY
Payer: COMMERCIAL

## 2021-11-05 ENCOUNTER — ANESTHESIA (OUTPATIENT)
Dept: PERIOP | Facility: AMBULARY SURGERY CENTER | Age: 46
End: 2021-11-05
Payer: COMMERCIAL

## 2021-11-05 VITALS
DIASTOLIC BLOOD PRESSURE: 70 MMHG | TEMPERATURE: 97 F | SYSTOLIC BLOOD PRESSURE: 157 MMHG | HEART RATE: 95 BPM | RESPIRATION RATE: 18 BRPM | OXYGEN SATURATION: 94 %

## 2021-11-05 DIAGNOSIS — I83.813 VARICOSE VEINS OF BOTH LOWER EXTREMITIES WITH PAIN: Primary | ICD-10-CM

## 2021-11-05 LAB
EXT PREGNANCY TEST URINE: NEGATIVE
EXT. CONTROL: NORMAL
GLUCOSE SERPL-MCNC: 130 MG/DL (ref 65–140)

## 2021-11-05 PROCEDURE — 82948 REAGENT STRIP/BLOOD GLUCOSE: CPT

## 2021-11-05 PROCEDURE — 81025 URINE PREGNANCY TEST: CPT | Performed by: SURGERY

## 2021-11-05 PROCEDURE — 37766 PHLEB VEINS - EXTREM 20+: CPT | Performed by: SURGERY

## 2021-11-05 RX ORDER — FENTANYL CITRATE 50 UG/ML
INJECTION, SOLUTION INTRAMUSCULAR; INTRAVENOUS AS NEEDED
Status: DISCONTINUED | OUTPATIENT
Start: 2021-11-05 | End: 2021-11-05

## 2021-11-05 RX ORDER — HYDROMORPHONE HCL/PF 1 MG/ML
SYRINGE (ML) INJECTION AS NEEDED
Status: DISCONTINUED | OUTPATIENT
Start: 2021-11-05 | End: 2021-11-05

## 2021-11-05 RX ORDER — LIDOCAINE HYDROCHLORIDE 20 MG/ML
INJECTION, SOLUTION EPIDURAL; INFILTRATION; INTRACAUDAL; PERINEURAL AS NEEDED
Status: DISCONTINUED | OUTPATIENT
Start: 2021-11-05 | End: 2021-11-05

## 2021-11-05 RX ORDER — OXYCODONE HYDROCHLORIDE AND ACETAMINOPHEN 5; 325 MG/1; MG/1
1 TABLET ORAL EVERY 4 HOURS PRN
Qty: 20 TABLET | Refills: 0 | Status: SHIPPED | OUTPATIENT
Start: 2021-11-05 | End: 2021-11-15

## 2021-11-05 RX ORDER — CHLORHEXIDINE GLUCONATE 0.12 MG/ML
15 RINSE ORAL EVERY 12 HOURS SCHEDULED
Status: DISCONTINUED | OUTPATIENT
Start: 2021-11-05 | End: 2021-11-05 | Stop reason: HOSPADM

## 2021-11-05 RX ORDER — PROPOFOL 10 MG/ML
INJECTION, EMULSION INTRAVENOUS AS NEEDED
Status: DISCONTINUED | OUTPATIENT
Start: 2021-11-05 | End: 2021-11-05

## 2021-11-05 RX ORDER — MAGNESIUM HYDROXIDE 1200 MG/15ML
LIQUID ORAL AS NEEDED
Status: DISCONTINUED | OUTPATIENT
Start: 2021-11-05 | End: 2021-11-05 | Stop reason: HOSPADM

## 2021-11-05 RX ORDER — CHLORHEXIDINE GLUCONATE 0.12 MG/ML
15 RINSE ORAL ONCE
Status: DISCONTINUED | OUTPATIENT
Start: 2021-11-05 | End: 2021-11-05 | Stop reason: HOSPADM

## 2021-11-05 RX ORDER — SODIUM CHLORIDE, SODIUM LACTATE, POTASSIUM CHLORIDE, CALCIUM CHLORIDE 600; 310; 30; 20 MG/100ML; MG/100ML; MG/100ML; MG/100ML
INJECTION, SOLUTION INTRAVENOUS CONTINUOUS PRN
Status: DISCONTINUED | OUTPATIENT
Start: 2021-11-05 | End: 2021-11-05

## 2021-11-05 RX ORDER — OXYCODONE HYDROCHLORIDE AND ACETAMINOPHEN 5; 325 MG/1; MG/1
2 TABLET ORAL EVERY 4 HOURS PRN
Status: DISCONTINUED | OUTPATIENT
Start: 2021-11-05 | End: 2021-11-05 | Stop reason: HOSPADM

## 2021-11-05 RX ORDER — ONDANSETRON 2 MG/ML
4 INJECTION INTRAMUSCULAR; INTRAVENOUS ONCE AS NEEDED
Status: DISCONTINUED | OUTPATIENT
Start: 2021-11-05 | End: 2021-11-05 | Stop reason: HOSPADM

## 2021-11-05 RX ORDER — OXYCODONE HYDROCHLORIDE AND ACETAMINOPHEN 5; 325 MG/1; MG/1
1 TABLET ORAL EVERY 4 HOURS PRN
Status: DISCONTINUED | OUTPATIENT
Start: 2021-11-05 | End: 2021-11-05 | Stop reason: HOSPADM

## 2021-11-05 RX ORDER — CEFAZOLIN SODIUM 1 G/50ML
1000 SOLUTION INTRAVENOUS ONCE
Status: COMPLETED | OUTPATIENT
Start: 2021-11-05 | End: 2021-11-05

## 2021-11-05 RX ORDER — EPHEDRINE SULFATE 50 MG/ML
INJECTION INTRAVENOUS AS NEEDED
Status: DISCONTINUED | OUTPATIENT
Start: 2021-11-05 | End: 2021-11-05

## 2021-11-05 RX ORDER — MIDAZOLAM HYDROCHLORIDE 2 MG/2ML
INJECTION, SOLUTION INTRAMUSCULAR; INTRAVENOUS AS NEEDED
Status: DISCONTINUED | OUTPATIENT
Start: 2021-11-05 | End: 2021-11-05

## 2021-11-05 RX ORDER — ONDANSETRON 2 MG/ML
INJECTION INTRAMUSCULAR; INTRAVENOUS AS NEEDED
Status: DISCONTINUED | OUTPATIENT
Start: 2021-11-05 | End: 2021-11-05

## 2021-11-05 RX ORDER — OXYCODONE HYDROCHLORIDE AND ACETAMINOPHEN 5; 325 MG/1; MG/1
2 TABLET ORAL EVERY 4 HOURS PRN
Status: DISCONTINUED | OUTPATIENT
Start: 2021-11-05 | End: 2021-11-05

## 2021-11-05 RX ORDER — FENTANYL CITRATE/PF 50 MCG/ML
25 SYRINGE (ML) INJECTION
Status: DISCONTINUED | OUTPATIENT
Start: 2021-11-05 | End: 2021-11-05 | Stop reason: HOSPADM

## 2021-11-05 RX ORDER — DEXAMETHASONE SODIUM PHOSPHATE 10 MG/ML
INJECTION, SOLUTION INTRAMUSCULAR; INTRAVENOUS AS NEEDED
Status: DISCONTINUED | OUTPATIENT
Start: 2021-11-05 | End: 2021-11-05

## 2021-11-05 RX ADMIN — MIDAZOLAM HYDROCHLORIDE 2 MG: 1 INJECTION, SOLUTION INTRAMUSCULAR; INTRAVENOUS at 09:19

## 2021-11-05 RX ADMIN — EPHEDRINE SULFATE 5 MG: 50 INJECTION, SOLUTION INTRAVENOUS at 09:51

## 2021-11-05 RX ADMIN — ONDANSETRON 4 MG: 2 INJECTION INTRAMUSCULAR; INTRAVENOUS at 09:24

## 2021-11-05 RX ADMIN — EPHEDRINE SULFATE 5 MG: 50 INJECTION, SOLUTION INTRAVENOUS at 10:00

## 2021-11-05 RX ADMIN — SODIUM CHLORIDE, SODIUM LACTATE, POTASSIUM CHLORIDE, AND CALCIUM CHLORIDE: .6; .31; .03; .02 INJECTION, SOLUTION INTRAVENOUS at 09:15

## 2021-11-05 RX ADMIN — FENTANYL CITRATE 25 MCG: 50 INJECTION INTRAMUSCULAR; INTRAVENOUS at 11:22

## 2021-11-05 RX ADMIN — FENTANYL CITRATE 25 MCG: 50 INJECTION, SOLUTION INTRAMUSCULAR; INTRAVENOUS at 09:24

## 2021-11-05 RX ADMIN — HYDROMORPHONE HYDROCHLORIDE 0.5 MG: 1 INJECTION, SOLUTION INTRAMUSCULAR; INTRAVENOUS; SUBCUTANEOUS at 09:37

## 2021-11-05 RX ADMIN — FENTANYL CITRATE 50 MCG: 50 INJECTION, SOLUTION INTRAMUSCULAR; INTRAVENOUS at 09:33

## 2021-11-05 RX ADMIN — FENTANYL CITRATE 25 MCG: 50 INJECTION INTRAMUSCULAR; INTRAVENOUS at 11:41

## 2021-11-05 RX ADMIN — PROPOFOL 200 MG: 10 INJECTION, EMULSION INTRAVENOUS at 09:24

## 2021-11-05 RX ADMIN — FENTANYL CITRATE 25 MCG: 50 INJECTION INTRAMUSCULAR; INTRAVENOUS at 11:27

## 2021-11-05 RX ADMIN — EPHEDRINE SULFATE 5 MG: 50 INJECTION, SOLUTION INTRAVENOUS at 09:41

## 2021-11-05 RX ADMIN — LIDOCAINE HYDROCHLORIDE 100 MG: 20 INJECTION, SOLUTION EPIDURAL; INFILTRATION; INTRACAUDAL at 09:24

## 2021-11-05 RX ADMIN — CEFAZOLIN SODIUM 1000 MG: 1 SOLUTION INTRAVENOUS at 09:18

## 2021-11-05 RX ADMIN — DEXAMETHASONE SODIUM PHOSPHATE 8 MG: 10 INJECTION, SOLUTION INTRAMUSCULAR; INTRAVENOUS at 09:24

## 2021-11-05 RX ADMIN — FENTANYL CITRATE 25 MCG: 50 INJECTION, SOLUTION INTRAMUSCULAR; INTRAVENOUS at 09:28

## 2021-11-08 ENCOUNTER — OFFICE VISIT (OUTPATIENT)
Dept: VASCULAR SURGERY | Facility: CLINIC | Age: 46
End: 2021-11-08

## 2021-11-08 VITALS
SYSTOLIC BLOOD PRESSURE: 120 MMHG | RESPIRATION RATE: 20 BRPM | HEART RATE: 77 BPM | DIASTOLIC BLOOD PRESSURE: 84 MMHG | HEIGHT: 65 IN | BODY MASS INDEX: 37.11 KG/M2

## 2021-11-08 DIAGNOSIS — Z98.890 POSTOPERATIVE STATE: ICD-10-CM

## 2021-11-08 DIAGNOSIS — I83.813 VARICOSE VEINS OF BOTH LOWER EXTREMITIES WITH PAIN: Primary | ICD-10-CM

## 2021-11-08 PROCEDURE — 99024 POSTOP FOLLOW-UP VISIT: CPT | Performed by: PHYSICIAN ASSISTANT

## 2021-11-08 RX ORDER — TRAZODONE HYDROCHLORIDE 100 MG/1
TABLET ORAL
COMMUNITY
Start: 2021-10-06

## 2021-11-08 RX ORDER — ESCITALOPRAM OXALATE 10 MG/1
20 TABLET ORAL
COMMUNITY
Start: 2021-10-06

## 2021-11-22 ENCOUNTER — OFFICE VISIT (OUTPATIENT)
Dept: AUDIOLOGY | Age: 46
End: 2021-11-22

## 2021-11-22 DIAGNOSIS — H90.3 SENSORY HEARING LOSS, BILATERAL: Primary | ICD-10-CM

## 2021-12-15 ENCOUNTER — OFFICE VISIT (OUTPATIENT)
Dept: VASCULAR SURGERY | Facility: CLINIC | Age: 46
End: 2021-12-15

## 2021-12-15 VITALS
HEIGHT: 65 IN | HEART RATE: 68 BPM | SYSTOLIC BLOOD PRESSURE: 132 MMHG | WEIGHT: 233 LBS | DIASTOLIC BLOOD PRESSURE: 88 MMHG | BODY MASS INDEX: 38.82 KG/M2

## 2021-12-15 DIAGNOSIS — I83.813 VARICOSE VEINS OF BOTH LOWER EXTREMITIES WITH PAIN: Primary | ICD-10-CM

## 2021-12-15 PROCEDURE — 99024 POSTOP FOLLOW-UP VISIT: CPT | Performed by: SURGERY

## 2021-12-28 DIAGNOSIS — M54.41 CHRONIC BILATERAL LOW BACK PAIN WITH BILATERAL SCIATICA: ICD-10-CM

## 2021-12-28 DIAGNOSIS — J45.40 MODERATE PERSISTENT ASTHMA WITHOUT COMPLICATION: ICD-10-CM

## 2021-12-28 DIAGNOSIS — G89.29 CHRONIC BILATERAL LOW BACK PAIN WITH BILATERAL SCIATICA: ICD-10-CM

## 2021-12-28 DIAGNOSIS — M54.42 CHRONIC BILATERAL LOW BACK PAIN WITH BILATERAL SCIATICA: ICD-10-CM

## 2021-12-28 RX ORDER — FLUTICASONE PROPIONATE AND SALMETEROL XINAFOATE 45; 21 UG/1; UG/1
AEROSOL, METERED RESPIRATORY (INHALATION)
Qty: 12 G | Refills: 1 | Status: SHIPPED | OUTPATIENT
Start: 2021-12-28 | End: 2022-03-07

## 2021-12-28 RX ORDER — GABAPENTIN 600 MG/1
TABLET ORAL
Qty: 90 TABLET | Refills: 3 | Status: SHIPPED | OUTPATIENT
Start: 2021-12-28

## 2022-02-28 ENCOUNTER — TELEPHONE (OUTPATIENT)
Dept: OTOLARYNGOLOGY | Facility: CLINIC | Age: 47
End: 2022-02-28

## 2022-03-05 DIAGNOSIS — J45.40 MODERATE PERSISTENT ASTHMA WITHOUT COMPLICATION: ICD-10-CM

## 2022-03-07 RX ORDER — FLUTICASONE PROPIONATE AND SALMETEROL XINAFOATE 45; 21 UG/1; UG/1
AEROSOL, METERED RESPIRATORY (INHALATION)
Qty: 12 G | Refills: 3 | Status: SHIPPED | OUTPATIENT
Start: 2022-03-07 | End: 2022-07-12

## 2022-05-18 ENCOUNTER — OFFICE VISIT (OUTPATIENT)
Dept: FAMILY MEDICINE CLINIC | Facility: CLINIC | Age: 47
End: 2022-05-18

## 2022-05-18 VITALS
WEIGHT: 235 LBS | HEART RATE: 87 BPM | TEMPERATURE: 98.6 F | OXYGEN SATURATION: 98 % | HEIGHT: 65 IN | SYSTOLIC BLOOD PRESSURE: 136 MMHG | BODY MASS INDEX: 39.15 KG/M2 | RESPIRATION RATE: 18 BRPM | DIASTOLIC BLOOD PRESSURE: 84 MMHG

## 2022-05-18 DIAGNOSIS — R31.9 HEMATURIA, UNSPECIFIED TYPE: Primary | ICD-10-CM

## 2022-05-18 DIAGNOSIS — E11.9 NEW ONSET TYPE 2 DIABETES MELLITUS (HCC): ICD-10-CM

## 2022-05-18 DIAGNOSIS — R05.9 COUGH: ICD-10-CM

## 2022-05-18 DIAGNOSIS — G89.29 CHRONIC BILATERAL LOW BACK PAIN WITH BILATERAL SCIATICA: ICD-10-CM

## 2022-05-18 DIAGNOSIS — J45.40 MODERATE PERSISTENT ASTHMA WITHOUT COMPLICATION: ICD-10-CM

## 2022-05-18 DIAGNOSIS — M54.42 CHRONIC BILATERAL LOW BACK PAIN WITH BILATERAL SCIATICA: ICD-10-CM

## 2022-05-18 DIAGNOSIS — M54.41 CHRONIC BILATERAL LOW BACK PAIN WITH BILATERAL SCIATICA: ICD-10-CM

## 2022-05-18 DIAGNOSIS — J30.89 ENVIRONMENTAL AND SEASONAL ALLERGIES: ICD-10-CM

## 2022-05-18 DIAGNOSIS — E66.9 OBESITY (BMI 35.0-39.9 WITHOUT COMORBIDITY): ICD-10-CM

## 2022-05-18 PROCEDURE — 99213 OFFICE O/P EST LOW 20 MIN: CPT | Performed by: FAMILY MEDICINE

## 2022-05-18 PROCEDURE — 3075F SYST BP GE 130 - 139MM HG: CPT | Performed by: FAMILY MEDICINE

## 2022-05-18 PROCEDURE — 3079F DIAST BP 80-89 MM HG: CPT | Performed by: FAMILY MEDICINE

## 2022-05-18 RX ORDER — FLUTICASONE PROPIONATE 50 MCG
1 SPRAY, SUSPENSION (ML) NASAL DAILY
Qty: 16 ML | Refills: 3 | Status: SHIPPED | OUTPATIENT
Start: 2022-05-18

## 2022-05-18 RX ORDER — FEXOFENADINE HYDROCHLORIDE 60 MG/1
60 TABLET, FILM COATED ORAL DAILY
Qty: 30 TABLET | Refills: 1 | Status: SHIPPED | OUTPATIENT
Start: 2022-05-18 | End: 2022-07-12

## 2022-05-18 RX ORDER — METHOCARBAMOL 750 MG/1
750 TABLET, FILM COATED ORAL EVERY 8 HOURS SCHEDULED
Qty: 90 TABLET | Refills: 3 | Status: SHIPPED | OUTPATIENT
Start: 2022-05-18

## 2022-05-18 RX ORDER — LIDOCAINE 50 MG/G
1 PATCH TOPICAL DAILY
Qty: 21 PATCH | Refills: 1 | Status: SHIPPED | OUTPATIENT
Start: 2022-05-18

## 2022-05-18 NOTE — PROGRESS NOTES
Assessment/Plan:    Chronic bilateral low back pain with bilateral sciatica  -No red flags  -Refilled Gabapentin and muscle relaxant  -Recommend Tylenol 1000 mg Q8, lidocaine patch and NSAID's PRN  -Handout provided for home back exercises  -Referral to Comprehensive spine    Hematuria  -Chronic intermittent Gross hematuria  -History of tobacco use  -Never followed up with Urology a few years ago  ultrasound bladder/kidney from 2020 was unremarkable  -Will repeat UA  -Referral to Urology( referral team to help with appointment)        Return in about 2 weeks (around 6/1/2022) for Annual physical, Next scheduled follow up  There are no Patient Instructions on file for this visit  Diagnoses and all orders for this visit:    Hematuria, unspecified type  -     Ambulatory Referral to Urology; Future  -     Urinalysis with microscopic    Chronic bilateral low back pain with bilateral sciatica  -     Ambulatory Referral to Comprehensive Spine Program; Future  -     methocarbamol (ROBAXIN) 750 mg tablet; Take 1 tablet (750 mg total) by mouth every 8 (eight) hours  -     lidocaine (Lidoderm) 5 %; Apply 1 patch topically in the morning  Remove & Discard patch within 12 hours or as directed by MD     Moderate persistent asthma without complication  -     fluticasone (FLONASE) 50 mcg/act nasal spray; 1 spray into each nostril in the morning  Cough  -     fexofenadine (ALLEGRA) 60 MG tablet; Take 1 tablet (60 mg total) by mouth in the morning  Environmental and seasonal allergies  -     fexofenadine (ALLEGRA) 60 MG tablet; Take 1 tablet (60 mg total) by mouth in the morning  New onset type 2 diabetes mellitus (HCC)  -     CBC and differential; Future  -     Comprehensive metabolic panel; Future  -     Hemoglobin A1C; Future    Obesity (BMI 35 0-39 9 without comorbidity)  -     TSH, 3rd generation with Free T4 reflex; Future  -     Lipid panel; Future          Subjective:     Pepper Spatz is a 50 y o  female who  has a past medical history of Anemia, Anxiety, Arthritis, Asthma, Back pain, Carpal tunnel syndrome, Constipation, Depression, Environmental allergies, GERD (gastroesophageal reflux disease), H/O cardiac murmur, Insomnia, Kidney stone, Migraine, Psychiatric disorder, Pyelonephritis, and Wears glasses  She has no past medical history of History of transfusion or Hypertension  who presented to the office today for follow up on bilateral low back pain  Her pain is chronic  Her pain is worse with physical activity (house chores etc)  Right now her pain is 3/10 but at worse 10/10  She currently takes gabapentin and Robaxin as needed  She states it does not help much  She needs refills  She denies fever, chills, N/V,  bowel or bladder incontinence  She also reports chronic intermittent gross hematuria  She states that she had ultrasound of her kidney/bladder done 2 years ago that was unremarkable  She was given referral to Urology back then but never had a chance to follow up  She states that her seasonal allergies have been acting up the past few days  She was prescribed Flonase and allegra in the past which helped   She has been experiencing itching eyes/nose and nasal congestion        HPI      The following portions of the patient's history were reviewed and updated as appropriate: allergies, current medications, past family history, past medical history, past social history, past surgical history and problem list     Current Outpatient Medications on File Prior to Visit   Medication Sig Dispense Refill    Advair HFA 45-21 MCG/ACT inhaler INHALE 2 PUFFS BY MOUTH TWICE A DAY RINSE MOUTH AFTER USE 12 g 3    albuterol (2 5 mg/3 mL) 0 083 % nebulizer solution Take 3 mL (2 5 mg total) by nebulization every 6 (six) hours as needed for wheezing or shortness of breath 30 mL 1    albuterol (PROVENTIL HFA,VENTOLIN HFA) 90 mcg/act inhaler Inhale 1 puff every 6 (six) hours as needed for wheezing 1 g 3    aspirin 81 mg chewable tablet Chew 81 mg daily      azelastine (ASTELIN) 0 1 % nasal spray 1 spray into each nostril 2 (two) times a day Use in each nostril as directed 1 Bottle 1    benzonatate (TESSALON) 200 MG capsule Take 1 capsule (200 mg total) by mouth 3 (three) times a day as needed for cough 20 capsule 3    buPROPion (WELLBUTRIN SR) 200 MG 12 hr tablet daily       Cholecalciferol (VITAMIN D) 50 MCG (2000 UT) tablet Take 1 tablet (2,000 Units total) by mouth daily (Patient not taking: Reported on 10/12/2021) 90 tablet 1    clonazePAM (KlonoPIN) 1 mg tablet Take 1 tablet (1 mg total) by mouth 2 (two) times a day 45 tablet 0    diclofenac (VOLTAREN) 75 mg EC tablet TAKE 1 TABLET BY MOUTH TWICE A DAY 60 tablet 1    ergocalciferol (VITAMIN D2) 50,000 units Take 1 capsule (50,000 Units total) by mouth once a week (Patient not taking: Reported on 7/21/2021) 4 capsule 2    ergocalciferol (VITAMIN D2) 50,000 units Take 1 capsule (50,000 Units total) by mouth once a week (Patient not taking: Reported on 7/21/2021) 4 capsule 2    escitalopram (LEXAPRO) 10 mg tablet 20 mg        gabapentin (NEURONTIN) 600 MG tablet TAKE 1 TABLET BY MOUTH THREE TIMES A DAY 90 tablet 3    hydrochlorothiazide (HYDRODIURIL) 25 mg tablet Take 1 tablet (25 mg total) by mouth daily 90 tablet 1    Melatonin 5 MG TABS Take by mouth      metFORMIN (GLUCOPHAGE) 1000 MG tablet TAKE 1 TABLET BY MOUTH TWICE A DAY WITH MEALS 60 tablet 0    Misc   Devices (PULSE OXIMETER) MISC by Does not apply route daily 1 each 0    nystatin (MYCOSTATIN) 500,000 units/5 mL suspension Apply 5 mL (500,000 Units total) to the mouth or throat 4 (four) times a day (Patient not taking: Reported on 10/12/2021) 100 mL 0    omeprazole (PriLOSEC) 40 MG capsule TAKE 1 CAPSULE BY MOUTH EVERY DAY BEFORE BREAKFAST 90 capsule 1    phentermine 37 5 MG capsule Take 1 capsule (37 5 mg total) by mouth every morning (Patient not taking: Reported on 7/21/2021) 30 capsule 1    simvastatin (ZOCOR) 40 mg tablet TAKE 1 TABLET BY MOUTH DAILY AT BEDTIME (Patient not taking: Reported on 7/21/2021) 30 tablet 2    traMADol (ULTRAM) 50 mg tablet Take 50 mg by mouth every 6 (six) hours as needed for moderate pain (Patient not taking: Reported on 7/21/2021)      traZODone (DESYREL) 100 mg tablet       [DISCONTINUED] fexofenadine (ALLEGRA) 60 MG tablet Take 1 tablet (60 mg total) by mouth daily (Patient not taking: Reported on 10/12/2021) 30 tablet 1    [DISCONTINUED] fluticasone (FLONASE) 50 mcg/act nasal spray SPRAY 1 SPRAY INTO EACH NOSTRIL EVERY DAY 16 mL 3    [DISCONTINUED] methocarbamol (ROBAXIN) 750 mg tablet Take 1 tablet (750 mg total) by mouth every 8 (eight) hours 90 tablet 3     No current facility-administered medications on file prior to visit  Review of Systems   Constitutional: Negative for chills, fatigue and fever  Respiratory: Negative for shortness of breath  Cardiovascular: Positive for leg swelling  Negative for chest pain  Gastrointestinal: Negative for nausea and vomiting  Genitourinary: Positive for hematuria  Negative for dysuria, flank pain, frequency and urgency  Musculoskeletal: Positive for back pain  Negative for gait problem and myalgias  Neurological: Negative for syncope  Objective:    /84 (BP Location: Left arm, Patient Position: Sitting, Cuff Size: Large)   Pulse 87   Temp 98 6 °F (37 °C) (Temporal)   Resp 18   Ht 5' 5" (1 651 m)   Wt 107 kg (235 lb)   LMP 09/01/2019 (Approximate)   SpO2 98%   BMI 39 11 kg/m²     Physical Exam  Constitutional:       General: She is not in acute distress  Appearance: Normal appearance  She is well-developed  She is not ill-appearing, toxic-appearing or diaphoretic  HENT:      Head: Normocephalic and atraumatic  Right Ear: External ear normal       Left Ear: External ear normal       Nose: Nose normal    Eyes:      General: No scleral icterus          Right eye: No discharge  Left eye: No discharge  Extraocular Movements: Extraocular movements intact  Neck:      Thyroid: No thyromegaly  Vascular: No JVD  Trachea: No tracheal deviation  Cardiovascular:      Rate and Rhythm: Normal rate and regular rhythm  Heart sounds: Normal heart sounds  No murmur heard  No friction rub  No gallop  Pulmonary:      Effort: Pulmonary effort is normal  No respiratory distress  Breath sounds: Normal breath sounds  No stridor  No wheezing or rhonchi  Abdominal:      General: Bowel sounds are normal  There is no distension  Palpations: Abdomen is soft  There is no mass  Tenderness: There is no abdominal tenderness  There is no right CVA tenderness, left CVA tenderness, guarding or rebound  Hernia: No hernia is present  Musculoskeletal:         General: No tenderness  Normal range of motion  Cervical back: Normal range of motion  Lumbar back: No swelling, edema, deformity, tenderness or bony tenderness  Normal range of motion  Right lower leg: No edema  Left lower leg: No edema  Skin:     General: Skin is warm  Capillary Refill: Capillary refill takes less than 2 seconds  Findings: No rash  Neurological:      Mental Status: She is alert and oriented to person, place, and time  Cranial Nerves: No cranial nerve deficit  Motor: No abnormal muscle tone        Coordination: Coordination normal       Deep Tendon Reflexes: Reflexes normal    Psychiatric:         Behavior: Behavior normal          Krystle Lopez MD  05/18/22  12:03 PM

## 2022-05-18 NOTE — ASSESSMENT & PLAN NOTE
-Chronic intermittent Gross hematuria  -History of tobacco use  -Never followed up with Urology a few years ago  ultrasound bladder/kidney from 2020 was unremarkable  -Will repeat UA  -Referral to Urology( referral team to help with appointment)

## 2022-05-18 NOTE — ASSESSMENT & PLAN NOTE
-No red flags  -Refilled Gabapentin and muscle relaxant  -Recommend Tylenol 1000 mg Q8, lidocaine patch and NSAID's PRN  -Handout provided for home back exercises  -Referral to Comprehensive spine

## 2022-05-19 ENCOUNTER — NURSE TRIAGE (OUTPATIENT)
Dept: PHYSICAL THERAPY | Facility: OTHER | Age: 47
End: 2022-05-19

## 2022-05-19 DIAGNOSIS — R31.9 HEMATURIA, UNSPECIFIED TYPE: Primary | ICD-10-CM

## 2022-05-19 DIAGNOSIS — G89.29 CHRONIC BILATERAL LOW BACK PAIN WITH LEFT-SIDED SCIATICA: Primary | ICD-10-CM

## 2022-05-19 DIAGNOSIS — M54.42 CHRONIC BILATERAL LOW BACK PAIN WITH LEFT-SIDED SCIATICA: Primary | ICD-10-CM

## 2022-05-19 NOTE — Clinical Note
Please inform patient that we would like her to get an ultrasound of her kidney before she sees the urologist  [facility]        NAME: Thony Jacome is a 27 y o  female  : 1987    MRN: [id number]  DATE: March 10, 2020  TIME: 10:10 AM    Assessment and Plan   Furuncle of right axilla [L02 421]  1  Furuncle of right axilla  Wound culture and Gram stain    cephalexin (KEFLEX) 500 mg capsule         Patient Instructions   Boil of the right axilla:   -The wound is actively draining  A wound culture was obtained  Call in the 48 hours for your results  -Keflex 500mg taken as directed  Was prescribed to treat the infection  Take with food and a probiotic    -Warm compress 2-3 times a day   -Keep the wound clean, dry and covered  Cleanse the area daily  -Advil or Tylenol for the pain   -I would like the patient to follow up with a Dermatologist within the next 7 days as the cyst may need to be excised  Follow up with PCP in 3-5 days  Proceed to  ER if symptoms worsen  Chief Complaint     Chief Complaint   Patient presents with    Recurrent Skin Infections     Pt here for a boil under right arm in the armpit area,   x2 days,   it drains at times, and the area is red and hot  Pt has been  using Ibuprofen anf Tylenol  History of Present Illness       The patient presents today for a boil of her right axilla x 2 days  The patient states that the area is erythematous, tender and warm to the touch and is draining a purulent material  She states that she has been taking Ibuprofen and Tylenol for the pain of the lesion  She denies fever, chills  She states that she has been using proper wound care and applying warm compress  She states that she gets recurrent boils and cyst but has not been evaluated by a Dermatologist        Review of Systems   Review of Systems   Constitutional: Negative for activity change, appetite change, chills, diaphoresis, fatigue and fever  HENT: Negative for facial swelling, sore throat and trouble swallowing      Respiratory: Negative for chest tightness, shortness of breath, wheezing and stridor  Cardiovascular: Negative for chest pain and palpitations  Musculoskeletal: Negative for arthralgias, joint swelling and myalgias  Skin: Positive for color change and wound  Allergic/Immunologic: Negative for environmental allergies, food allergies and immunocompromised state  Neurological: Negative for dizziness and light-headedness  Hematological: Negative for adenopathy  Does not bruise/bleed easily           Current Medications       Current Outpatient Medications:     ALPRAZolam (XANAX) 0 25 mg tablet, TAKE 1 TABLET BYMOUTH ONCE DAILY IF NEEDED, Disp: , Rfl:     amphetamine-dextroamphetamine (ADDERALL XR) 20 MG 24 hr capsule, 20 mg 2 (two) times a day, Disp: , Rfl:     BASAGLAR KWIKPEN 100 units/mL injection pen, INJECT 50 TO 60 UNITS SUBCUTANEOUSLY DAILY, Disp: , Rfl:     FLUoxetine (PROzac) 20 mg capsule, Take 3 tabs daily, Disp: , Rfl:     hydrOXYzine HCL (ATARAX) 10 mg tablet, TAKE 1 OR 2 TABLETS TWICE DAILY IF NEEDED, Disp: , Rfl:     insulin aspart (NovoLOG) 100 units/mL injection, Inject under the skin, Disp: , Rfl:     traZODone (DESYREL) 100 mg tablet, Take 100 mg by mouth, Disp: , Rfl:     cephalexin (KEFLEX) 500 mg capsule, Take 1 capsule (500 mg total) by mouth every 8 (eight) hours for 7 days, Disp: 21 capsule, Rfl: 0    Current Allergies     Allergies as of 03/10/2020 - Reviewed 03/10/2020   Allergen Reaction Noted    Duloxetine Other (See Comments) 09/15/2016    Simvastatin Myalgia 05/10/2016            The following portions of the patient's history were reviewed and updated as appropriate: allergies, current medications, past family history, past medical history, past social history, past surgical history and problem list      Past Medical History:   Diagnosis Date    ADHD (attention deficit hyperactivity disorder)     Anxiety     Depression     Diabetes ([facility] [address] )     type 1       Past Surgical History:   Procedure Laterality Date     SECTION      RHINOPLASTY         Family History   Problem Relation Age of Onset    Heart disease Mother     Cancer Mother     Kidney cancer Father          Medications have been verified  Objective   /68 (BP Location: Right arm, Patient Position: Sitting, Cuff Size: Standard)   Pulse 95   Temp 98 7 °F (37 1 °C) (Tympanic)   Resp 18   Ht 5' 4" (1 626 m)   Wt 77 2 kg (170 lb 3 2 oz)   SpO2 99%   BMI 29 21 kg/m²        Physical Exam     Physical Exam   Constitutional: She appears well-developed and well-nourished  No distress  HENT:   Mouth/Throat: Uvula is midline, oropharynx is clear and moist and mucous membranes are normal    Cardiovascular: Normal rate, regular rhythm, S1 normal, S2 normal, normal heart sounds and normal pulses  No murmur heard  Pulmonary/Chest: Effort normal and breath sounds normal  No accessory muscle usage or stridor  No tachypnea  No respiratory distress  She has no decreased breath sounds  She has no wheezes  She has no rhonchi  She has no rales  Skin: Skin is warm  Capillary refill takes less than 2 seconds  Lesion (There is a 2cm x 1cm area of erythema and edema with a central area that is raised and draining a purulent material of the right axilla  The area is tender to touch  ) noted  No rash noted  She is not diaphoretic  There is erythema  Nursing note and vitals reviewed

## 2022-05-19 NOTE — TELEPHONE ENCOUNTER
Additional Information   Negative: Is this related to a work injury?  Negative: Is this related to an MVA?  Negative: Are you currently recieving homecare services?  Negative: Has the patient had unexplained weight loss?  Negative: Does the patient have a fever?  Negative: Is the patient experiencing urine retention?  Negative: Is the patient experiencing acute drop foot or paralysis?  Negative: Has the patient experienced major trauma? (fall from height, high speed collision, direct blow to spine) and is also experiencing nausea, light-headedness, or loss of consciousness?  Negative: Is the patient experiencing blood in sputum?  Affirmative: Is this a chronic condition? Background - Initial Assessment  Clinical complaint: bilateral low back pain which radiates down the left leg  Chronic for over 2 yrs and has done PT in the past  States it has gotten worse over the last 4-5 months  NKI  Date of onset: 4-5 months  Frequency of pain: intermittent  Quality of pain: aching    Protocols used: [facility] COMPREHENSIVE SPINE PROGRAM PROTOCOL    This RN did review in detail the Comprehensive Spine Program and what we can provide for their back pain  Patient is agreeable to being triaged by this RN and would like to proceed with Physical Therapy  Referral was placed for Physical Therapy at the [facility]  Patients information was sent to the  to make evaluation appointment  Patient made aware that the PT office  will be calling to schedule the appointment  Patient was provided with the phone number to the PT office  No further questions and/or concerns were voiced by the patient at this time  Patient states understanding of the referral that was placed  Referral Closed

## 2022-05-24 DIAGNOSIS — E11.9 NEW ONSET TYPE 2 DIABETES MELLITUS (HCC): ICD-10-CM

## 2022-06-01 ENCOUNTER — HOSPITAL ENCOUNTER (OUTPATIENT)
Dept: ULTRASOUND IMAGING | Facility: HOSPITAL | Age: 47
Discharge: HOME | End: 2022-06-01
Payer: COMMERCIAL

## 2022-06-01 DIAGNOSIS — R31.9 HEMATURIA, UNSPECIFIED TYPE: ICD-10-CM

## 2022-06-01 PROCEDURE — 76770 US EXAM ABDO BACK WALL COMP: CPT

## 2022-07-10 DIAGNOSIS — J30.89 ENVIRONMENTAL AND SEASONAL ALLERGIES: ICD-10-CM

## 2022-07-10 DIAGNOSIS — J45.40 MODERATE PERSISTENT ASTHMA WITHOUT COMPLICATION: ICD-10-CM

## 2022-07-10 DIAGNOSIS — R05.9 COUGH: ICD-10-CM

## 2022-07-12 RX ORDER — FLUTICASONE PROPIONATE AND SALMETEROL XINAFOATE 45; 21 UG/1; UG/1
AEROSOL, METERED RESPIRATORY (INHALATION)
Qty: 12 G | Refills: 3 | Status: SHIPPED | OUTPATIENT
Start: 2022-07-12 | End: 2022-10-17

## 2022-07-12 RX ORDER — FEXOFENADINE HYDROCHLORIDE 60 MG/1
60 TABLET, FILM COATED ORAL DAILY
Qty: 30 TABLET | Refills: 1 | Status: SHIPPED | OUTPATIENT
Start: 2022-07-12 | End: 2022-09-12

## 2022-09-12 DIAGNOSIS — R05.9 COUGH: ICD-10-CM

## 2022-09-12 DIAGNOSIS — J30.89 ENVIRONMENTAL AND SEASONAL ALLERGIES: ICD-10-CM

## 2022-09-12 RX ORDER — FEXOFENADINE HYDROCHLORIDE 60 MG/1
60 TABLET, FILM COATED ORAL DAILY
Qty: 30 TABLET | Refills: 1 | Status: SHIPPED | OUTPATIENT
Start: 2022-09-12

## 2022-09-14 DIAGNOSIS — M54.42 CHRONIC BILATERAL LOW BACK PAIN WITH BILATERAL SCIATICA: ICD-10-CM

## 2022-09-14 DIAGNOSIS — E11.9 NEW ONSET TYPE 2 DIABETES MELLITUS (HCC): ICD-10-CM

## 2022-09-14 DIAGNOSIS — G89.29 CHRONIC BILATERAL LOW BACK PAIN WITH BILATERAL SCIATICA: ICD-10-CM

## 2022-09-14 DIAGNOSIS — M54.41 CHRONIC BILATERAL LOW BACK PAIN WITH BILATERAL SCIATICA: ICD-10-CM

## 2022-09-15 RX ORDER — GABAPENTIN 600 MG/1
TABLET ORAL
Qty: 90 TABLET | Refills: 3 | Status: SHIPPED | OUTPATIENT
Start: 2022-09-15

## 2022-10-17 ENCOUNTER — HOSPITAL ENCOUNTER (OUTPATIENT)
Dept: RADIOLOGY | Facility: HOSPITAL | Age: 47
Discharge: HOME | End: 2022-10-17
Payer: COMMERCIAL

## 2022-10-17 ENCOUNTER — APPOINTMENT (OUTPATIENT)
Dept: LAB | Facility: CLINIC | Age: 47
End: 2022-10-17
Payer: COMMERCIAL

## 2022-10-17 ENCOUNTER — OFFICE VISIT (OUTPATIENT)
Dept: PULMONOLOGY | Facility: CLINIC | Age: 47
End: 2022-10-17
Payer: COMMERCIAL

## 2022-10-17 VITALS
HEIGHT: 65 IN | SYSTOLIC BLOOD PRESSURE: 132 MMHG | TEMPERATURE: 96.7 F | HEART RATE: 78 BPM | WEIGHT: 247 LBS | OXYGEN SATURATION: 98 % | DIASTOLIC BLOOD PRESSURE: 80 MMHG | BODY MASS INDEX: 41.15 KG/M2

## 2022-10-17 DIAGNOSIS — J45.40 MODERATE PERSISTENT ASTHMA WITHOUT COMPLICATION: ICD-10-CM

## 2022-10-17 DIAGNOSIS — J45.40 MODERATE PERSISTENT ASTHMA WITHOUT COMPLICATION: Primary | ICD-10-CM

## 2022-10-17 LAB
BACTERIA UR QL AUTO: ABNORMAL /HPF
BASOPHILS # BLD AUTO: 0.03 THOUSANDS/ΜL (ref 0–0.1)
BASOPHILS NFR BLD AUTO: 0 % (ref 0–1)
BILIRUB UR QL STRIP: NEGATIVE
CLARITY UR: ABNORMAL
COLOR UR: YELLOW
EOSINOPHIL # BLD AUTO: 0.13 THOUSAND/ΜL (ref 0–0.61)
EOSINOPHIL NFR BLD AUTO: 2 % (ref 0–6)
ERYTHROCYTE [DISTWIDTH] IN BLOOD BY AUTOMATED COUNT: 11.7 % (ref 11.6–15.1)
GLUCOSE UR STRIP-MCNC: NEGATIVE MG/DL
GRAN CASTS #/AREA URNS LPF: ABNORMAL /[LPF]
HCT VFR BLD AUTO: 41.2 % (ref 34.8–46.1)
HGB BLD-MCNC: 13.1 G/DL (ref 11.5–15.4)
HGB UR QL STRIP.AUTO: ABNORMAL
HYALINE CASTS #/AREA URNS LPF: ABNORMAL /LPF
IMM GRANULOCYTES # BLD AUTO: 0.02 THOUSAND/UL (ref 0–0.2)
IMM GRANULOCYTES NFR BLD AUTO: 0 % (ref 0–2)
KETONES UR STRIP-MCNC: NEGATIVE MG/DL
LEUKOCYTE ESTERASE UR QL STRIP: NEGATIVE
LYMPHOCYTES # BLD AUTO: 2.87 THOUSANDS/ΜL (ref 0.6–4.47)
LYMPHOCYTES NFR BLD AUTO: 36 % (ref 14–44)
MCH RBC QN AUTO: 30.5 PG (ref 26.8–34.3)
MCHC RBC AUTO-ENTMCNC: 31.8 G/DL (ref 31.4–37.4)
MCV RBC AUTO: 96 FL (ref 82–98)
MONOCYTES # BLD AUTO: 0.51 THOUSAND/ΜL (ref 0.17–1.22)
MONOCYTES NFR BLD AUTO: 6 % (ref 4–12)
MUCOUS THREADS UR QL AUTO: ABNORMAL
NEUTROPHILS # BLD AUTO: 4.5 THOUSANDS/ΜL (ref 1.85–7.62)
NEUTS SEG NFR BLD AUTO: 56 % (ref 43–75)
NITRITE UR QL STRIP: NEGATIVE
NON-SQ EPI CELLS URNS QL MICRO: ABNORMAL /HPF
NRBC BLD AUTO-RTO: 0 /100 WBCS
PH UR STRIP.AUTO: 6 [PH]
PLATELET # BLD AUTO: 196 THOUSANDS/UL (ref 149–390)
PMV BLD AUTO: 10.2 FL (ref 8.9–12.7)
PROT UR STRIP-MCNC: ABNORMAL MG/DL
RBC # BLD AUTO: 4.3 MILLION/UL (ref 3.81–5.12)
RBC #/AREA URNS AUTO: ABNORMAL /HPF
SP GR UR STRIP.AUTO: 1.02 (ref 1–1.03)
TRANS CELLS #/AREA URNS HPF: PRESENT /[HPF]
UROBILINOGEN UR STRIP-ACNC: 2 MG/DL
WBC # BLD AUTO: 8.06 THOUSAND/UL (ref 4.31–10.16)
WBC #/AREA URNS AUTO: ABNORMAL /HPF

## 2022-10-17 PROCEDURE — 86671 FUNGUS NES ANTIBODY: CPT

## 2022-10-17 PROCEDURE — 99214 OFFICE O/P EST MOD 30 MIN: CPT | Performed by: INTERNAL MEDICINE

## 2022-10-17 PROCEDURE — 86003 ALLG SPEC IGE CRUDE XTRC EA: CPT

## 2022-10-17 PROCEDURE — 86602 ANTINOMYCES ANTIBODY: CPT

## 2022-10-17 PROCEDURE — 86331 IMMUNODIFFUSION OUCHTERLONY: CPT

## 2022-10-17 PROCEDURE — 36415 COLL VENOUS BLD VENIPUNCTURE: CPT

## 2022-10-17 PROCEDURE — 85025 COMPLETE CBC W/AUTO DIFF WBC: CPT

## 2022-10-17 PROCEDURE — 86606 ASPERGILLUS ANTIBODY: CPT

## 2022-10-17 PROCEDURE — 82785 ASSAY OF IGE: CPT

## 2022-10-17 PROCEDURE — 71046 X-RAY EXAM CHEST 2 VIEWS: CPT

## 2022-10-17 RX ORDER — MONTELUKAST SODIUM 10 MG/1
10 TABLET ORAL
Qty: 30 TABLET | Refills: 3 | Status: SHIPPED | OUTPATIENT
Start: 2022-10-17 | End: 2022-11-16

## 2022-10-17 RX ORDER — FLUTICASONE PROPIONATE AND SALMETEROL XINAFOATE 230; 21 UG/1; UG/1
2 AEROSOL, METERED RESPIRATORY (INHALATION) 2 TIMES DAILY
Qty: 12 G | Refills: 2 | Status: SHIPPED | OUTPATIENT
Start: 2022-10-17 | End: 2022-11-16

## 2022-10-17 NOTE — PROGRESS NOTES
Pulmonary Follow Up Note   Lexy Munguia Corinna Romano 50 y o  female MRN: [id number]  10/17/2022      Assessment:    1  Moderate persistent asthma  · Patient reports worsening symptoms for the past 4-5 months consistent of SOB, chest tightness, wheezing, rhinorrhea, itchy throat  She wakes up almost nightly with shortness of breath requiring albuterol use  · Currently on Advair 45-21mcg/act  Using it as needed  · Reports living in moldy, humid basement  · Plan:  · Increase dose of Advair HFA to 230-21 mcg/act 2 puffs BID  · Start Singulair 10 mg daily at bedtime  · Repeat CXR  · Will order CBC with differential, NE panel and HP panel  2  TERESA  · Previous sleep study with mild obstructive sleep apnea with AHI 9 3/REM AHI 23 9  · On CPAP at home, however not using it for the past year because of malfunctioning  · ESS 15   · Plan:  · Will schedule appointment to evaluate her CPAP machine  3  Environmental and seasonal allergies  · Worse in the summer and with high pollen environments  · Previous NE panel unremarkable  · Plan:  · Will repeat NE panel and HP panel  · CBC with differential to evaluate for eosinophilia  Plan:    Diagnoses and all orders for this visit:    Moderate persistent asthma without complication  -     [facility] Allergy Panel, Adult; Future  -     XR chest pa & lateral; Future  -     fluticasone-salmeterol (Advair HFA) 230-21 MCG/ACT inhaler; Inhale 2 puffs 2 (two) times a day Rinse mouth after use  -     montelukast (SINGULAIR) 10 mg tablet; Take 1 tablet (10 mg total) by mouth daily at bedtime  -     CBC and differential; Future  -     Hypersensitivity pnuemonitis profile; Future        Return in about 6 weeks (around 11/28/2022) for Schedule follow up with Dr Abida Majano     History of Present Illness   HPI:  Zenaida Nichole is a 50 y o  female who is here for a follow up visit regarding asthma, TERESA and worsening shortness of breath   Patient was last seen in the office on 05/2021  At that time, her symptoms were well controlled  She is currently on Advair HFA 45-21mcg/act and albuterol as needed  Patient states she has been feeling worsening shortness of breath, especially on exertion, for the past 4 months  This is accompanied by chest tightness and wheezing as well as rhinorrhea, itchy throat and post-nasal drip  Patient states she has been using the Advair as needed when symptoms are worse or when there is a lot of pollen in the air  She has been using the albuterol rescue inhaler 2-3 times daily  Patient reports waking up almost every night for the past month with shortness of breath requiring her rescue inhaler  She has also been feeling very fatigued  Patient has a CPAP machine, however it has not been working appropriately and she has not been using it for almost 1 year  She reports she is not able to sleep well at night  She wakes up multiple times a night and she feels tired during the day  She takes naps almost daily  Patient is a former smoker  She used to smoke for 32 years  Started at the age of 13 and quit in 2019  She was smoking up to 2 packs/day at one point  Patient is currently unemployed  She spents most of her time at home  She lives in a basement unit and reports high levels of humidity  She has noted mold growing on her furniture  She reports the air feels humid and moldy  She has a dehumidifier which she needs to empty a couple of times every day  Patient has no pets  Reports having a fishtank with 3 fish  The basement does not have a radon mitigation system that they know of  Denies any exposure to wild animals, chemicals or fumes  Review of Systems   Constitutional: Positive for fatigue  Negative for chills and fever  HENT: Positive for hearing loss, postnasal drip, rhinorrhea, sneezing and sore throat  Respiratory: Positive for cough, chest tightness, shortness of breath and wheezing      Cardiovascular: Negative for chest pain and palpitations  Gastrointestinal: Negative for abdominal pain  Neurological: Negative for dizziness and light-headedness  Hematological: Negative  Psychiatric/Behavioral: Negative  Historical Information   Past Medical History:   Diagnosis Date   • Anemia    • Anxiety    • Arthritis     hands, knee   • Asthma     uses inhalers for the same   No recnt flairups   • Back pain    • Carpal tunnel syndrome     Bilateral   • Constipation    • Depression    • Environmental allergies    • GERD (gastroesophageal reflux disease)    • H/O cardiac murmur    • Insomnia    • Kidney stone    • Migraine    • Psychiatric disorder     anxiety   • Pyelonephritis    • Wears glasses      Past Surgical History:   Procedure Laterality Date   • BACK SURGERY      States she isn't sure what she had done-possibly something to do with a lump or muscle   • CHOLECYSTECTOMY     • CYSTOSCOPY      onset: 6/1/16, resolved: 6/1/16   • ESOPHAGOGASTRODUODENOSCOPY     • HI LIGATN LONG SAPHENOUS VEIN AT [facility] Left 12/22/2016    Procedure: LIGATION/STRIPPING VEIN, LIGATION OF ANTERIOR TRIBUTARY GREATER SAPHENOUS VEIN BRANCH;  Surgeon: Padmini Boggs DO;  Location: [facility];  Service: Vascular   • HI PHLEB VEINS - EXTREM - TO 20 Left 12/22/2016    Procedure: MULTIPLE STAB PHLEBECTOMIES;  Surgeon: Padmini Boggs DO;  Location: [facility];  Service: Vascular   • HI PHLEB VEINS - EXTREM 20+ Right 11/5/2021    Procedure: MULTIPLE STAB PHLEBECTOMIES;  Surgeon: Padmini Boggs DO;  [facility];  Service: Vascular   • TUBAL LIGATION     • VARICOSE VEIN SURGERY Left     Left leg   • VEIN LIGATION Right 6/5/2017    Procedure: LIGATION GREATER SAPHENOUS VEIN TRIBUTARY WITH STAB PHLEBECTOMIES ;  Surgeon: Padmini Boggs DO;  Location: [facility];  Service:      Family History   Problem Relation Age of Onset   • Asthma Mother    • Coronary artery disease Mother    • Depression Mother    • Diabetes Mother    • Hypertension Mother    • Varicose Veins Mother    • Asthma Father    • Hypertension Father    • Lung cancer Father    • No Known Problems Sister    • No Known Problems Daughter    • No Known Problems Maternal Grandmother    • Colon cancer Maternal Grandfather    • No Known Problems Paternal Grandmother    • No Known Problems Paternal Grandfather    • Breast cancer Maternal Aunt    • Breast cancer Maternal Aunt    • Ovarian cancer Maternal Aunt          Meds/Allergies     Current Outpatient Medications:   •  Advair HFA 45-21 MCG/ACT inhaler, INHALE 2 PUFFS BY MOUTH TWICE A DAY RINSE MOUTH AFTER USE, Disp: 12 g, Rfl: 3  •  albuterol (2 5 mg/3 mL) 0 083 % nebulizer solution, Take 3 mL (2 5 mg total) by nebulization every 6 (six) hours as needed for wheezing or shortness of breath, Disp: 30 mL, Rfl: 1  •  albuterol (PROVENTIL HFA,VENTOLIN HFA) 90 mcg/act inhaler, Inhale 1 puff every 6 (six) hours as needed for wheezing, Disp: 1 g, Rfl: 3  •  aspirin 81 mg chewable tablet, Chew 81 mg daily, Disp: , Rfl:   •  azelastine (ASTELIN) 0 1 % nasal spray, 1 spray into each nostril 2 (two) times a day Use in each nostril as directed, Disp: 1 Bottle, Rfl: 1  •  benzonatate (TESSALON) 200 MG capsule, Take 1 capsule (200 mg total) by mouth 3 (three) times a day as needed for cough, Disp: 20 capsule, Rfl: 3  •  buPROPion (WELLBUTRIN SR) 200 MG 12 hr tablet, daily , Disp: , Rfl:   •  Cholecalciferol (VITAMIN D) 50 MCG (2000 UT) tablet, Take 1 tablet (2,000 Units total) by mouth daily, Disp: 90 tablet, Rfl: 1  •  clonazePAM (KlonoPIN) 1 mg tablet, Take 1 tablet (1 mg total) by mouth 2 (two) times a day, Disp: 45 tablet, Rfl: 0  •  escitalopram (LEXAPRO) 10 mg tablet, 20 mg  , Disp: , Rfl:   •  fluticasone (FLONASE) 50 mcg/act nasal spray, 1 spray into each nostril in the morning , Disp: 16 mL, Rfl: 3  •  gabapentin (NEURONTIN) 600 MG tablet, TAKE 1 TABLET BY MOUTH THREE TIMES A DAY, Disp: 90 tablet, Rfl: 3  •  lidocaine (Lidoderm) 5 %, Apply 1 patch topically in the morning  Remove & Discard patch within 12 hours or as directed by MD , Disp: 21 patch, Rfl: 1  •  Melatonin 5 MG TABS, Take by mouth, Disp: , Rfl:   •  metFORMIN (GLUCOPHAGE) 1000 MG tablet, Take 1 tablet (1,000 mg total) by mouth 2 (two) times a day with meals, Disp: 90 tablet, Rfl: 0  •  methocarbamol (ROBAXIN) 750 mg tablet, Take 1 tablet (750 mg total) by mouth every 8 (eight) hours, Disp: 90 tablet, Rfl: 3  •  omeprazole (PriLOSEC) 40 MG capsule, TAKE 1 CAPSULE BY MOUTH EVERY DAY BEFORE BREAKFAST, Disp: 90 capsule, Rfl: 1  •  diclofenac (VOLTAREN) 75 mg EC tablet, TAKE 1 TABLET BY MOUTH TWICE A DAY (Patient not taking: Reported on 10/17/2022), Disp: 60 tablet, Rfl: 1  •  ergocalciferol (VITAMIN D2) 50,000 units, Take 1 capsule (50,000 Units total) by mouth once a week (Patient not taking: Reported on 10/17/2022), Disp: 4 capsule, Rfl: 2  •  ergocalciferol (VITAMIN D2) 50,000 units, Take 1 capsule (50,000 Units total) by mouth once a week (Patient not taking: No sig reported), Disp: 4 capsule, Rfl: 2  •  fexofenadine (ALLEGRA) 60 MG tablet, TAKE 1 TABLET (60 MG TOTAL) BY MOUTH IN THE MORNING  (Patient not taking: Reported on 10/17/2022), Disp: 30 tablet, Rfl: 1  •  hydrochlorothiazide (HYDRODIURIL) 25 mg tablet, Take 1 tablet (25 mg total) by mouth daily (Patient not taking: Reported on 10/17/2022), Disp: 90 tablet, Rfl: 1  •  Misc   Devices (PULSE OXIMETER) MISC, by Does not apply route daily (Patient not taking: Reported on 10/17/2022), Disp: 1 each, Rfl: 0  •  nystatin (MYCOSTATIN) 500,000 units/5 mL suspension, Apply 5 mL (500,000 Units total) to the mouth or throat 4 (four) times a day (Patient not taking: No sig reported), Disp: 100 mL, Rfl: 0  •  phentermine 37 5 MG capsule, Take 1 capsule (37 5 mg total) by mouth every morning (Patient not taking: No sig reported), Disp: 30 capsule, Rfl: 1  •  simvastatin (ZOCOR) 40 mg tablet, TAKE 1 TABLET BY MOUTH DAILY AT BEDTIME (Patient not taking: No sig reported), Disp: 30 tablet, Rfl: 2  •  traMADol (ULTRAM) 50 mg tablet, Take 50 mg by mouth every 6 (six) hours as needed for moderate pain (Patient not taking: No sig reported), Disp: , Rfl:   •  traZODone (DESYREL) 100 mg tablet, , Disp: , Rfl:   No Known Allergies    Vitals: Blood pressure 132/80, pulse 78, temperature (!) 96 7 °F (35 9 °C), temperature source Tympanic, height 5' 5" (1 651 m), weight 112 kg (247 lb), last menstrual period 09/01/2019, SpO2 98 %, not currently breastfeeding  Body mass index is 41 1 kg/m²  Oxygen Therapy  SpO2: 98 %  Oxygen Therapy: None (Room air)      Physical Exam  Physical Exam  Vitals reviewed  Constitutional:       General: She is not in acute distress  Appearance: She is not ill-appearing or toxic-appearing  HENT:      Head: Normocephalic  Eyes:      Pupils: Pupils are equal, round, and reactive to light  Cardiovascular:      Rate and Rhythm: Normal rate and regular rhythm  Heart sounds: No murmur heard  No gallop  Pulmonary:      Effort: No respiratory distress  Breath sounds: Normal breath sounds  No wheezing, rhonchi or rales  Abdominal:      General: There is no distension  Palpations: Abdomen is soft  Tenderness: There is no abdominal tenderness  There is no guarding  Musculoskeletal:      Right lower leg: No edema  Left lower leg: No edema  Skin:     General: Skin is warm  Capillary Refill: Capillary refill takes less than 2 seconds  Neurological:      Mental Status: She is alert and oriented to person, place, and time  Cranial Nerves: No cranial nerve deficit  Psychiatric:         Mood and Affect: Mood normal          Labs: I have personally reviewed pertinent lab results    Lab Results   Component Value Date    WBC 7 00 11/01/2021    HGB 13 2 11/01/2021    HCT 38 9 11/01/2021    MCV 96 11/01/2021     11/01/2021     Lab Results   Component Value Date    GLUCOSE 102 12/16/2015 CALCIUM 9 4 11/01/2021     04/27/2018    K 4 2 11/01/2021    CO2 29 11/01/2021     11/01/2021    BUN 12 11/01/2021    CREATININE 0 62 11/01/2021     Lab Results   Component Value Date    IGE 85 6 06/24/2021     Lab Results   Component Value Date     (H) 06/24/2021     (H) 06/24/2021    ALKPHOS 156 (H) 06/24/2021    BILITOT 0 5 04/27/2018       Imaging and other studies: I have personally reviewed pertinent reports  CXR 7/13/2020: CXR post covid infection with no evidence of pneumonia  Mild scar or atelectasis at the right base  Pulmonary function testing:   PFTs 2020: FEV1/FVC Ratio is 89 %  FEV1 is 2 44 L/83 % of predicted  FVC is 2 73 L/75 % of predicted  RV 1 18 L/65 % of predicted, TLC 4 05 L/77 % of predicted, RV/TLC ratio 29 %  DLCO 102 % of predicted    EKG, Pathology, and Other Studies: I have personally reviewed pertinent reports  TTE and stress test 07/2021: Negative stress test for ischemia  Max work rate 7 METs  LVEF 60%      Kem Aaron MD  Pulmonary and Critical Care Fellowship, PGY4  [facility]

## 2022-10-18 LAB

## 2022-10-21 LAB
A FUMIGATUS1 AB SER QL ID: NEGATIVE
A PULLULANS AB SER QL: NEGATIVE
LACEYELLA SACCHARI AB SER QL: NEGATIVE
PIGEON SERUM AB QL ID: NEGATIVE
S RECTIVIRGULA AB SER QL ID: NEGATIVE
T VULGARIS AB SER QL ID: NEGATIVE

## 2022-11-01 ENCOUNTER — TELEPHONE (OUTPATIENT)
Dept: PULMONOLOGY | Facility: CLINIC | Age: 47
End: 2022-11-01

## 2022-11-01 NOTE — TELEPHONE ENCOUNTER
Pt  came to see me and I made sure her pressure was correct at 5-20cm, the machine is in working condition and I forced the modem to call in so we can view her data electronically  I gave her the recall info to register the machine

## 2022-11-10 DIAGNOSIS — R05.9 COUGH: ICD-10-CM

## 2022-11-10 DIAGNOSIS — J30.89 ENVIRONMENTAL AND SEASONAL ALLERGIES: ICD-10-CM

## 2022-11-10 RX ORDER — FEXOFENADINE HYDROCHLORIDE 60 MG/1
60 TABLET, FILM COATED ORAL DAILY
Qty: 30 TABLET | Refills: 1 | Status: SHIPPED | OUTPATIENT
Start: 2022-11-10

## 2022-11-29 NOTE — PROGRESS NOTES
Pulmonary Follow Up Note   Ophelia Cline Ezra Roberson 50 y o  female MRN: [id number]  11/29/2022      Assessment:    1  Moderate persistent asthma  ? Patient doing better on the higher dose of Advair and Singulair  ? CXR WNL  ? NE panel with IgE > 80  She might benefit from Xolair in the future if inhalers do not work or if she has frequent exacerbations  ? Plan:  - Continue Advair HFA to 230-21 mcg/act 2 puffs BID    - Continue Singulair 10 mg daily at bedtime       2  TERESA  ? Previous sleep study with mild obstructive sleep apnea with AHI 9 3/REM AHI 23 9  ? On CPAP at home and recently evaluated in the office  She is awaiting for parts to be mailed to her house  ? Will review compliance in next visit        Return in about 3 months (around 2/28/2023)  History of Present Illness   HPI:  Salome Merlos is a 50 y o  female who  who is here for a follow up visit regarding asthma, TERESA and worsening shortness of breath  Patient was last seen in the office on 10/17/2022  At that time, Advair dose was increased to 230-21 mcg/act 2 puffs BID and Singulair was started qHS  Lab workup also ordered  CBC with no evidence of eosinophilia  NE panel negative with IgE 83  Patient is a former smoker  She used to smoke for 32 years  Started at the age of 13 and quit in 2019  She was smoking up to 2 packs/day at one point  Patient is currently unemployed  She spents most of her time at home  She lives in a basement unit and reports high levels of humidity  She has noted mold growing on her furniture  She reports the air feels humid and moldy  She has a dehumidifier which she needs to empty a couple of times every day  Patient has no pets  Reports having a fishtank with 3 fish  The basement does not have a radon mitigation system that they know of  Denies any exposure to wild animals, chemicals or fumes  Today, patient reports feeling significantly better on the higher dose of Advair   She has been helping her daughter who is pregnant with twins  She was recently sick with URI but has recovered completely  She denies any worsening shortness of breath or chest pain  Her CPAP machine was reviewed and she is waiting for new parts to be sent to her house      Review of Systems   Constitutional: Positive for appetite change  Negative for fever  HENT: Positive for ear pain, rhinorrhea, sneezing and sore throat  Negative for postnasal drip and trouble swallowing  Respiratory: Positive for cough and shortness of breath  Cardiovascular: Positive for chest pain  Musculoskeletal: Positive for myalgias  Neurological: Positive for headaches  Historical Information   Past Medical History:   Diagnosis Date   • Anemia    • Anxiety    • Arthritis     hands, knee   • Asthma     uses inhalers for the same   No recnt flairups   • Back pain    • Carpal tunnel syndrome     Bilateral   • Constipation    • Depression    • Environmental allergies    • GERD (gastroesophageal reflux disease)    • H/O cardiac murmur    • Insomnia    • Kidney stone    • Migraine    • Psychiatric disorder     anxiety   • Pyelonephritis    • Wears glasses      Past Surgical History:   Procedure Laterality Date   • BACK SURGERY      States she isn't sure what she had done-possibly something to do with a lump or muscle   • CHOLECYSTECTOMY     • CYSTOSCOPY      onset: 6/1/16, resolved: 6/1/16   • ESOPHAGOGASTRODUODENOSCOPY     • SC LIGATN LONG SAPHENOUS VEIN AT [facility] JUN Left 12/22/2016    Procedure: LIGATION/STRIPPING VEIN, LIGATION OF ANTERIOR TRIBUTARY GREATER SAPHENOUS VEIN BRANCH;  Surgeon: Karmen Salazar DO;  Location: [facility];  Service: Vascular   • SC PHLEB VEINS - EXTREM - TO 20 Left 12/22/2016    Procedure: MULTIPLE STAB PHLEBECTOMIES;  Surgeon: Karmen Salazar DO;  Location: [facility];  Service: Vascular   • SC PHLEB VEINS - EXTREM 20+ Right 11/5/2021    Procedure: MULTIPLE STAB PHLEBECTOMIES;  Surgeon: Karmen Salazar DO; [facility] OR;  Service: Vascular   • TUBAL LIGATION     • VARICOSE VEIN SURGERY Left     Left leg   • VEIN LIGATION Right 6/5/2017    Procedure: LIGATION GREATER SAPHENOUS VEIN TRIBUTARY WITH STAB PHLEBECTOMIES ;  Surgeon: Misti Forman DO;  Location: [facility];  Service:      Family History   Problem Relation Age of Onset   • Asthma Mother    • Coronary artery disease Mother    • Depression Mother    • Diabetes Mother    • Hypertension Mother    • Varicose Veins Mother    • Asthma Father    • Hypertension Father    • Lung cancer Father    • No Known Problems Sister    • No Known Problems Daughter    • No Known Problems Maternal Grandmother    • Colon cancer Maternal Grandfather    • No Known Problems Paternal Grandmother    • No Known Problems Paternal Grandfather    • Breast cancer Maternal Aunt    • Breast cancer Maternal Aunt    • Ovarian cancer Maternal Aunt          Meds/Allergies     Current Outpatient Medications:   •  albuterol (2 5 mg/3 mL) 0 083 % nebulizer solution, Take 3 mL (2 5 mg total) by nebulization every 6 (six) hours as needed for wheezing or shortness of breath, Disp: 30 mL, Rfl: 1  •  albuterol (PROVENTIL HFA,VENTOLIN HFA) 90 mcg/act inhaler, Inhale 1 puff every 6 (six) hours as needed for wheezing, Disp: 1 g, Rfl: 3  •  aspirin 81 mg chewable tablet, Chew 81 mg daily, Disp: , Rfl:   •  azelastine (ASTELIN) 0 1 % nasal spray, 1 spray into each nostril 2 (two) times a day Use in each nostril as directed, Disp: 1 Bottle, Rfl: 1  •  benzonatate (TESSALON) 200 MG capsule, Take 1 capsule (200 mg total) by mouth 3 (three) times a day as needed for cough, Disp: 20 capsule, Rfl: 3  •  buPROPion (WELLBUTRIN SR) 200 MG 12 hr tablet, daily , Disp: , Rfl:   •  Cholecalciferol (VITAMIN D) 50 MCG (2000 UT) tablet, Take 1 tablet (2,000 Units total) by mouth daily, Disp: 90 tablet, Rfl: 1  •  clonazePAM (KlonoPIN) 1 mg tablet, Take 1 tablet (1 mg total) by mouth 2 (two) times a day, Disp: 45 tablet, Rfl: 0  •  diclofenac (VOLTAREN) 75 mg EC tablet, TAKE 1 TABLET BY MOUTH TWICE A DAY (Patient not taking: Reported on 10/17/2022), Disp: 60 tablet, Rfl: 1  •  ergocalciferol (VITAMIN D2) 50,000 units, Take 1 capsule (50,000 Units total) by mouth once a week (Patient not taking: Reported on 10/17/2022), Disp: 4 capsule, Rfl: 2  •  ergocalciferol (VITAMIN D2) 50,000 units, Take 1 capsule (50,000 Units total) by mouth once a week (Patient not taking: No sig reported), Disp: 4 capsule, Rfl: 2  •  escitalopram (LEXAPRO) 10 mg tablet, 20 mg  , Disp: , Rfl:   •  fexofenadine (ALLEGRA) 60 MG tablet, TAKE 1 TABLET (60 MG TOTAL) BY MOUTH IN THE MORNING , Disp: 30 tablet, Rfl: 1  •  fluticasone (FLONASE) 50 mcg/act nasal spray, 1 spray into each nostril in the morning , Disp: 16 mL, Rfl: 3  •  fluticasone-salmeterol (Advair HFA) 230-21 MCG/ACT inhaler, Inhale 2 puffs 2 (two) times a day Rinse mouth after use , Disp: 12 g, Rfl: 2  •  gabapentin (NEURONTIN) 600 MG tablet, TAKE 1 TABLET BY MOUTH THREE TIMES A DAY, Disp: 90 tablet, Rfl: 3  •  hydrochlorothiazide (HYDRODIURIL) 25 mg tablet, Take 1 tablet (25 mg total) by mouth daily (Patient not taking: Reported on 10/17/2022), Disp: 90 tablet, Rfl: 1  •  lidocaine (Lidoderm) 5 %, Apply 1 patch topically in the morning  Remove & Discard patch within 12 hours or as directed by MD , Disp: 21 patch, Rfl: 1  •  Melatonin 5 MG TABS, Take by mouth, Disp: , Rfl:   •  metFORMIN (GLUCOPHAGE) 1000 MG tablet, Take 1 tablet (1,000 mg total) by mouth 2 (two) times a day with meals, Disp: 90 tablet, Rfl: 0  •  methocarbamol (ROBAXIN) 750 mg tablet, Take 1 tablet (750 mg total) by mouth every 8 (eight) hours, Disp: 90 tablet, Rfl: 3  •  Misc   Devices (PULSE OXIMETER) MISC, by Does not apply route daily (Patient not taking: Reported on 10/17/2022), Disp: 1 each, Rfl: 0  •  montelukast (SINGULAIR) 10 mg tablet, Take 1 tablet (10 mg total) by mouth daily at bedtime, Disp: 30 tablet, Rfl: 3  •  nystatin (MYCOSTATIN) 500,000 units/5 mL suspension, Apply 5 mL (500,000 Units total) to the mouth or throat 4 (four) times a day (Patient not taking: No sig reported), Disp: 100 mL, Rfl: 0  •  omeprazole (PriLOSEC) 40 MG capsule, TAKE 1 CAPSULE BY MOUTH EVERY DAY BEFORE BREAKFAST, Disp: 90 capsule, Rfl: 1  •  phentermine 37 5 MG capsule, Take 1 capsule (37 5 mg total) by mouth every morning (Patient not taking: No sig reported), Disp: 30 capsule, Rfl: 1  •  simvastatin (ZOCOR) 40 mg tablet, TAKE 1 TABLET BY MOUTH DAILY AT BEDTIME (Patient not taking: No sig reported), Disp: 30 tablet, Rfl: 2  •  traMADol (ULTRAM) 50 mg tablet, Take 50 mg by mouth every 6 (six) hours as needed for moderate pain (Patient not taking: No sig reported), Disp: , Rfl:   •  traZODone (DESYREL) 100 mg tablet, , Disp: , Rfl:   No Known Allergies    Vitals: Last menstrual period 09/01/2019, not currently breastfeeding  There is no height or weight on file to calculate BMI  Physical Exam  Physical Exam  Vitals reviewed  Constitutional:       General: She is not in acute distress  Appearance: She is not ill-appearing or toxic-appearing  HENT:      Head: Normocephalic  Eyes:      Pupils: Pupils are equal, round, and reactive to light  Cardiovascular:      Rate and Rhythm: Normal rate and regular rhythm  Heart sounds: No murmur heard  No gallop  Pulmonary:      Effort: No respiratory distress  Breath sounds: Normal breath sounds  No wheezing, rhonchi or rales  Abdominal:      General: There is no distension  Palpations: Abdomen is soft  Tenderness: There is no abdominal tenderness  There is no guarding  Musculoskeletal:      Right lower leg: No edema  Left lower leg: No edema  Skin:     General: Skin is warm  Capillary Refill: Capillary refill takes less than 2 seconds  Neurological:      Mental Status: She is alert and oriented to person, place, and time  Cranial Nerves:  No cranial nerve deficit  Psychiatric:         Mood and Affect: Mood normal              Labs: I have personally reviewed pertinent lab results  Lab Results   Component Value Date    WBC 8 06 10/17/2022    HGB 13 1 10/17/2022    HCT 41 2 10/17/2022    MCV 96 10/17/2022     10/17/2022     Lab Results   Component Value Date    GLUCOSE 102 12/16/2015    CALCIUM 9 4 11/01/2021     04/27/2018    K 4 2 11/01/2021    CO2 29 11/01/2021     11/01/2021    BUN 12 11/01/2021    CREATININE 0 62 11/01/2021     Lab Results   Component Value Date    IGE 83 6 10/17/2022     Lab Results   Component Value Date     (H) 06/24/2021     (H) 06/24/2021    ALKPHOS 156 (H) 06/24/2021    BILITOT 0 5 04/27/2018       Imaging and other studies: I have personally reviewed pertinent reports  CXR 10/17/2022: Normal cardiac silhouette  Lungs are clear  No evidence of pneumothorax, pulmonary edema, pleural effusion or consolidation  CXR 7/13/2020: CXR post covid infection with no evidence of pneumonia  Mild scar or atelectasis at the right base  Pulmonary function testing:   PFTs 2020: FEV1/FVC Ratio is 89 %  FEV1 is 2 44 L/83 % of predicted  FVC is 2 73 L/75 % of predicted  RV 1 18 L/65 % of predicted, TLC 4 05 L/77 % of predicted, RV/TLC ratio 29 %  DLCO 102 % of predicted    EKG, Pathology, and Other Studies: I have personally reviewed pertinent reports  TTE and stress test 07/2021: Negative stress test for ischemia  Max work rate 7 METs  LVEF 60%      Teodoro Oro MD  Pulmonary and Critical Care Fellowship, PGY4  [facility]      Answers for HPI/ROS submitted by the patient on 11/27/2022  Do you have chest tightness?: Yes  Chronicity: recurrent  When did you first notice your symptoms?: more than 1 year ago  How often do your symptoms occur?: daily  Since you first noticed this problem, how has it changed?: unchanged  Do you have shortness of breath that occurs with effort or exertion?: Yes  Do you have ear congestion?: Yes  Do you have heartburn?: Yes  Do you have fatigue?: Yes  Do you have nasal congestion?: Yes  Do you have shortness of breath when lying flat?: Yes  Do you have shortness of breath when you wake up?: No  Do you have sweats?: No  Have you experienced weight loss?: No  Which of the following makes your symptoms worse?: animal exposure, any activity, change in weather, climbing stairs, emotional stress, exercise, exposure to fumes, exposure to smoke, pollen, strenuous activity, URI  Which of the following makes your symptoms better?: cold air, prescription cough suppressant, steroid inhaler

## 2022-11-30 ENCOUNTER — OFFICE VISIT (OUTPATIENT)
Dept: PULMONOLOGY | Facility: CLINIC | Age: 47
End: 2022-11-30

## 2022-11-30 VITALS
SYSTOLIC BLOOD PRESSURE: 128 MMHG | RESPIRATION RATE: 16 BRPM | OXYGEN SATURATION: 97 % | BODY MASS INDEX: 39.65 KG/M2 | DIASTOLIC BLOOD PRESSURE: 70 MMHG | WEIGHT: 238 LBS | HEIGHT: 65 IN | TEMPERATURE: 98 F | HEART RATE: 67 BPM

## 2022-11-30 DIAGNOSIS — R05.9 COUGH: ICD-10-CM

## 2022-11-30 DIAGNOSIS — G47.33 OSA (OBSTRUCTIVE SLEEP APNEA): Primary | ICD-10-CM

## 2022-11-30 DIAGNOSIS — J45.40 MODERATE PERSISTENT ASTHMA WITHOUT COMPLICATION: ICD-10-CM

## 2022-11-30 RX ORDER — ALBUTEROL SULFATE 2.5 MG/3ML
2.5 SOLUTION RESPIRATORY (INHALATION) EVERY 6 HOURS PRN
Qty: 30 ML | Refills: 1 | Status: SHIPPED | OUTPATIENT
Start: 2022-11-30

## 2022-11-30 RX ORDER — IBUPROFEN 600 MG/1
TABLET ORAL
COMMUNITY
Start: 2022-10-26

## 2022-11-30 RX ORDER — ESCITALOPRAM OXALATE 20 MG/1
20 TABLET ORAL EVERY MORNING
COMMUNITY
Start: 2022-09-12

## 2022-11-30 RX ORDER — CLONAZEPAM 0.5 MG/1
TABLET ORAL
COMMUNITY
Start: 2022-11-15

## 2022-11-30 RX ORDER — BENZONATATE 200 MG/1
200 CAPSULE ORAL 3 TIMES DAILY PRN
Qty: 20 CAPSULE | Refills: 3 | Status: SHIPPED | OUTPATIENT
Start: 2022-11-30

## 2022-12-01 DIAGNOSIS — J45.40 MODERATE PERSISTENT ASTHMA WITHOUT COMPLICATION: ICD-10-CM

## 2022-12-01 RX ORDER — ALBUTEROL SULFATE 90 UG/1
1 AEROSOL, METERED RESPIRATORY (INHALATION) EVERY 6 HOURS PRN
Qty: 18 G | Refills: 3 | Status: SHIPPED | OUTPATIENT
Start: 2022-12-01

## 2022-12-21 ENCOUNTER — OFFICE VISIT (OUTPATIENT)
Dept: AUDIOLOGY | Age: 47
End: 2022-12-21

## 2022-12-21 DIAGNOSIS — H90.3 SENSORY HEARING LOSS, BILATERAL: Primary | ICD-10-CM

## 2022-12-21 NOTE — LETTER
HEARING EVALUATION    Name:  Teja Salomon  :  1975  Age:  50 y o  Date of Evaluation: 22     History: Known Hearing Loss binaurally  Reason for visit: Teja Salomon is being seen today at the request of Dr Darshan Cee for an evaluation of hearing  Patient reports bilateral pulsatile tinnitus  Patient denies otalgia, otorrhea, dizziness, and fullness  Patient denies a family history of hearing loss and noise exposure  EVALUATION:    Otoscopic Evaluation:   Right Ear: Clear and healthy ear canal and tympanic membrane   Left Ear: Clear and healthy ear canal and tympanic membrane    Tympanometry:   Right: Type A - normal middle ear pressure and compliance   Left: Type A - normal middle ear pressure and compliance    Audiogram Results:  Pure tone testing revealed a moderate rising to mild mixed hearing loss in the  right ear and a mild rising to normal mixed hearing loss in the  left  ear  SRT and PTA are in agreement indicating good test reliability  Word recognition scores were excellent bilaterally  *see attached audiogram      RECOMMENDATIONS:  Annual hearing eval, Consult ENT, Hearing Aid Evaluation and Copy to Patient/Caregiver    PATIENT EDUCATION:   Discussed results and recommendations with patient  Questions were addressed and the patient was encouraged to contact our department should concerns arise        Gi Borden , CCC-A  Clinical Audiologist

## 2022-12-21 NOTE — PROGRESS NOTES
HEARING EVALUATION    Name:  Jamarcus Baker  :  1975  Age:  50 y o  Date of Evaluation: 22     History: Known Hearing Loss binaurally  Reason for visit: Jamarcus Baker is being seen today at the request of Dr Lb Zaragoza for an evaluation of hearing  Patient reports bilateral pulsatile tinnitus  Patient denies otalgia, otorrhea, dizziness, and fullness  Patient denies a family history of hearing loss and noise exposure  EVALUATION:    Otoscopic Evaluation:   Right Ear: Clear and healthy ear canal and tympanic membrane   Left Ear: Clear and healthy ear canal and tympanic membrane    Tympanometry:   Right: Type A - normal middle ear pressure and compliance   Left: Type A - normal middle ear pressure and compliance    Audiogram Results:  Pure tone testing revealed a moderate rising to mild mixed hearing loss in the  right ear and a mild rising to normal mixed hearing loss in the  left  ear  SRT and PTA are in agreement indicating good test reliability  Word recognition scores were excellent bilaterally  *see attached audiogram      RECOMMENDATIONS:  Annual hearing eval, Consult ENT, Hearing Aid Evaluation and Copy to Patient/Caregiver    PATIENT EDUCATION:   Discussed results and recommendations with patient  Questions were addressed and the patient was encouraged to contact our department should concerns arise        Gi Butler , CCC-A  Clinical Audiologist

## 2022-12-21 NOTE — PROGRESS NOTES
Hearing Aid Evaluation  Name:  Carin Alonso  :  1975  Age:  50 y o  Date of Evaluation: 22     Audiologic Results: Audiologic testing was performed on 22, testing revealed an asymmetrical mixed hearing loss  Amplification is recommended binaurally    Hearing Aid Evaluation:  Hearing aid styles, technology, and price were discussed with the patient  Possible hearing aid options, programs, and accessories were discussed  Pricing options and technology levels were discussed to determine the appropriate device to recommend  Recommendation/Quote: The first hearing aid recommendation is  Oticon MORE 3 Mini RITE-R  The second hearing aid recommendation is  Oticon Zircon 1 Mini RITE-R      See attached quote sheet*    Selection:   The patient will call back with selection   Level: TBD   Color: Chroma Beige    size: Right  / Left    Dome size: TBD   Accessories: [address], [place] [address] , CCC-A  Clinical Audiologist

## 2023-01-05 ENCOUNTER — OFFICE VISIT (OUTPATIENT)
Dept: AUDIOLOGY | Age: 48
End: 2023-01-05

## 2023-01-05 DIAGNOSIS — H90.3 SENSORY HEARING LOSS, BILATERAL: Primary | ICD-10-CM

## 2023-01-05 NOTE — PROGRESS NOTES
Progress Note    Name:  Janet Riley  :  1975  Age:  46 y o  Date of Evaluation: 23     Patient called to order hearing aids  Paid half today       Hearing aids ordered: Order number [id number]       Gi Salguero , [facility]  Clinical Audiologist

## 2023-01-09 NOTE — PROGRESS NOTES
Progress Note    Name:  Gissel Cartagena  :  1975  Age:  46 y o  Date of Evaluation: 23     Received hearing aids:     Oticon MORE 3 Mini RITE-R hearing aid(s)  Right serial number [id number]  Left serial number [id number]  Warranty date: 26 (Loss/Damage and repair)  Dome/Earmold: 8DB   : Right  / Left   : [id number]     Patient scheduled on       Gi Sosa , CCC-A  Clinical Audiologist

## 2023-01-11 ENCOUNTER — OFFICE VISIT (OUTPATIENT)
Dept: AUDIOLOGY | Age: 48
End: 2023-01-11

## 2023-01-11 DIAGNOSIS — H90.3 SENSORY HEARING LOSS, BILATERAL: Primary | ICD-10-CM

## 2023-01-11 NOTE — PROGRESS NOTES
Hearing Aid Fitting    Name:  Jennifer Bliss  :  1975  Age:  46 y o  Date of Evaluation: 23     Jennifer Bliss is being see today to be fit with new hearing aids through [employer]  Patient is fit with OtSwifto MORE 3 Mini RITE-R hearing aid(s)  Right serial number [id number]  Left serial number [id number]  Warranty date:  26  (Loss/Damage and repair)  Dome/Earmold: 8DB   : Right  / Left   : [id number]      Ear mold Battery  Dome   Right - R  8DB   Left - R  8DB     The hearing aid(s) were adjusted based on the patient's most recent audiogram and patient comfort  Patient noted good sound quality, and was happy with the fitting  Care and cleaning of the hearing aids was reviewed  Domes, filters, and batteries and user manual were reviewed with the patient  Insertion and removal of the hearing aids was done  The patient practiced insertion and removal of the devices in the office, they demonstrated excellent ability to manipulate the hearing aids  Telephone use was reviewed with the patient  The patient expressed satisfaction with the hearing aids  Recommendation:   Follow up in two weeks         Gi Griffith , CCC-A  Clinical Audiologist

## 2023-01-18 DIAGNOSIS — J30.89 ENVIRONMENTAL AND SEASONAL ALLERGIES: ICD-10-CM

## 2023-01-18 DIAGNOSIS — J45.40 MODERATE PERSISTENT ASTHMA WITHOUT COMPLICATION: ICD-10-CM

## 2023-01-18 DIAGNOSIS — R05.9 COUGH: ICD-10-CM

## 2023-01-18 RX ORDER — FLUTICASONE PROPIONATE AND SALMETEROL XINAFOATE 230; 21 UG/1; UG/1
AEROSOL, METERED RESPIRATORY (INHALATION)
Qty: 12 G | Refills: 2 | Status: SHIPPED | OUTPATIENT
Start: 2023-01-18 | End: 2023-01-19 | Stop reason: SDUPTHER

## 2023-01-18 RX ORDER — FEXOFENADINE HYDROCHLORIDE 60 MG/1
60 TABLET, FILM COATED ORAL DAILY
Qty: 30 TABLET | Refills: 1 | Status: SHIPPED | OUTPATIENT
Start: 2023-01-18

## 2023-01-19 ENCOUNTER — OFFICE VISIT (OUTPATIENT)
Dept: FAMILY MEDICINE CLINIC | Facility: CLINIC | Age: 48
End: 2023-01-19

## 2023-01-19 VITALS
HEART RATE: 71 BPM | SYSTOLIC BLOOD PRESSURE: 126 MMHG | OXYGEN SATURATION: 98 % | BODY MASS INDEX: 39.99 KG/M2 | WEIGHT: 240 LBS | DIASTOLIC BLOOD PRESSURE: 80 MMHG | TEMPERATURE: 98.7 F | HEIGHT: 65 IN | RESPIRATION RATE: 16 BRPM

## 2023-01-19 DIAGNOSIS — M54.42 CHRONIC BILATERAL LOW BACK PAIN WITH BILATERAL SCIATICA: ICD-10-CM

## 2023-01-19 DIAGNOSIS — E55.9 VITAMIN D DEFICIENCY: ICD-10-CM

## 2023-01-19 DIAGNOSIS — K21.9 GASTROESOPHAGEAL REFLUX DISEASE WITHOUT ESOPHAGITIS: ICD-10-CM

## 2023-01-19 DIAGNOSIS — G89.29 CHRONIC BILATERAL LOW BACK PAIN WITH BILATERAL SCIATICA: ICD-10-CM

## 2023-01-19 DIAGNOSIS — E11.9 TYPE 2 DIABETES MELLITUS WITHOUT COMPLICATION, WITHOUT LONG-TERM CURRENT USE OF INSULIN (HCC): Primary | ICD-10-CM

## 2023-01-19 DIAGNOSIS — M79.89 LEG SWELLING: ICD-10-CM

## 2023-01-19 DIAGNOSIS — J45.40 MODERATE PERSISTENT ASTHMA WITHOUT COMPLICATION: ICD-10-CM

## 2023-01-19 DIAGNOSIS — E66.9 OBESITY (BMI 35.0-39.9 WITHOUT COMORBIDITY): ICD-10-CM

## 2023-01-19 DIAGNOSIS — I83.892 VARICOSE VEINS OF LEFT LEG WITH EDEMA: ICD-10-CM

## 2023-01-19 DIAGNOSIS — F33.41 RECURRENT MAJOR DEPRESSIVE DISORDER, IN PARTIAL REMISSION (HCC): ICD-10-CM

## 2023-01-19 DIAGNOSIS — H90.3 SENSORINEURAL HEARING LOSS (SNHL) OF BOTH EARS: ICD-10-CM

## 2023-01-19 DIAGNOSIS — M54.41 CHRONIC BILATERAL LOW BACK PAIN WITH BILATERAL SCIATICA: ICD-10-CM

## 2023-01-19 LAB — SL AMB POCT HEMOGLOBIN AIC: 6.1 (ref ?–6.5)

## 2023-01-19 RX ORDER — IBUPROFEN 400 MG/1
400 TABLET ORAL EVERY 8 HOURS PRN
Qty: 60 TABLET | Refills: 0 | Status: SHIPPED | OUTPATIENT
Start: 2023-01-19

## 2023-01-19 RX ORDER — BUPROPION HYDROCHLORIDE 200 MG/1
200 TABLET, EXTENDED RELEASE ORAL 2 TIMES DAILY
Qty: 60 TABLET | Refills: 0 | Status: SHIPPED | OUTPATIENT
Start: 2023-01-19 | End: 2023-02-18

## 2023-01-19 RX ORDER — MONTELUKAST SODIUM 10 MG/1
10 TABLET ORAL
Qty: 30 TABLET | Refills: 1 | Status: SHIPPED | OUTPATIENT
Start: 2023-01-19 | End: 2023-02-18

## 2023-01-19 RX ORDER — GABAPENTIN 600 MG/1
600 TABLET ORAL 3 TIMES DAILY
Qty: 90 TABLET | Refills: 3 | Status: SHIPPED | OUTPATIENT
Start: 2023-01-19

## 2023-01-19 RX ORDER — ESCITALOPRAM OXALATE 20 MG/1
20 TABLET ORAL EVERY MORNING
Qty: 90 TABLET | Refills: 1 | Status: SHIPPED | OUTPATIENT
Start: 2023-01-19

## 2023-01-19 RX ORDER — ALBUTEROL SULFATE 90 UG/1
1 AEROSOL, METERED RESPIRATORY (INHALATION) EVERY 6 HOURS PRN
Qty: 18 G | Refills: 3 | Status: SHIPPED | OUTPATIENT
Start: 2023-01-19

## 2023-01-19 RX ORDER — CHOLECALCIFEROL (VITAMIN D3) 50 MCG
2000 TABLET ORAL DAILY
Qty: 90 TABLET | Refills: 1 | Status: SHIPPED | OUTPATIENT
Start: 2023-01-19

## 2023-01-19 RX ORDER — PANTOPRAZOLE SODIUM 40 MG/1
40 TABLET, DELAYED RELEASE ORAL DAILY
Qty: 90 TABLET | Refills: 1 | Status: SHIPPED | OUTPATIENT
Start: 2023-01-19

## 2023-01-19 RX ORDER — FLUTICASONE PROPIONATE AND SALMETEROL XINAFOATE 230; 21 UG/1; UG/1
2 AEROSOL, METERED RESPIRATORY (INHALATION) 2 TIMES DAILY
Qty: 12 G | Refills: 2 | Status: SHIPPED | OUTPATIENT
Start: 2023-01-19

## 2023-01-19 RX ORDER — HYDROCHLOROTHIAZIDE 25 MG/1
25 TABLET ORAL DAILY PRN
Qty: 90 TABLET | Refills: 1 | Status: SHIPPED | OUTPATIENT
Start: 2023-01-19

## 2023-01-19 RX ORDER — ALBUTEROL SULFATE 2.5 MG/3ML
2.5 SOLUTION RESPIRATORY (INHALATION) EVERY 6 HOURS PRN
Qty: 30 ML | Refills: 1 | Status: SHIPPED | OUTPATIENT
Start: 2023-01-19

## 2023-01-19 NOTE — ASSESSMENT & PLAN NOTE
Lower extremity edema stable  Continue diuretic as needed for swelling  Recommend compression stockings  Recommend low-sodium diet and frequent leg elevation

## 2023-01-19 NOTE — ASSESSMENT & PLAN NOTE
Lab Results   Component Value Date    HGBA1C 6 1 01/19/2023   -Hemoglobin A1c has improved significantly  -Patient reports significant dietary and lifestyle changes  -Continue metformin 1000 mg twice daily  -Will check urine microalbumin creatinine ratio  -Recommend annual podiatry and ophthalmology evaluation

## 2023-01-19 NOTE — PROGRESS NOTES
Name: Stormy Bansal      : 1975      MRN: [id number]  Encounter Provider: Rachel Velasquez MD  Encounter Date: 2023   Encounter department: [address] [place] [address]     1  Type 2 diabetes mellitus without complication, without long-term current use of insulin ([facility] )  Assessment & Plan:    Lab Results   Component Value Date    HGBA1C 6 1 2023   -Hemoglobin A1c has improved significantly  -Patient reports significant dietary and lifestyle changes  -Continue metformin 1000 mg twice daily  -Will check urine microalbumin creatinine ratio  -Recommend annual podiatry and ophthalmology evaluation      Orders:  -     POCT hemoglobin A1c  -     CBC and differential; Future  -     Comprehensive metabolic panel; Future  -     Lipid panel; Future  -     metFORMIN (GLUCOPHAGE) 1000 MG tablet; Take 1 tablet (1,000 mg total) by mouth 2 (two) times a day with meals  -     Microalbumin / creatinine urine ratio  -     Diabetic foot exam; Future    2  Gastroesophageal reflux disease without esophagitis  Assessment & Plan:  Stable   continue PPI    Orders:  -     pantoprazole (PROTONIX) 40 mg tablet; Take 1 tablet (40 mg total) by mouth daily    3  Moderate persistent asthma without complication  Assessment & Plan:  Stable  No recent exacerbation  Continue Advair/albuterol/montelukast    Orders:  -     montelukast (SINGULAIR) 10 mg tablet; Take 1 tablet (10 mg total) by mouth daily at bedtime  -     albuterol (PROVENTIL HFA,VENTOLIN HFA) 90 mcg/act inhaler; Inhale 1 puff every 6 (six) hours as needed for wheezing  -     albuterol (2 5 mg/3 mL) 0 083 % nebulizer solution; Take 3 mL (2 5 mg total) by nebulization every 6 (six) hours as needed for wheezing or shortness of breath  -     fluticasone-salmeterol (Advair HFA) 230-21 MCG/ACT inhaler; Inhale 2 puffs 2 (two) times a day Rinse mouth after use      4  Varicose veins of left leg with edema  Assessment & Plan:  Lower extremity edema stable  Continue diuretic as needed for swelling  Recommend compression stockings  Recommend low-sodium diet and frequent leg elevation      5  Leg swelling  -     hydrochlorothiazide (HYDRODIURIL) 25 mg tablet; Take 1 tablet (25 mg total) by mouth daily as needed (leg swelling)    6  Chronic bilateral low back pain with bilateral sciatica  -     ibuprofen (MOTRIN) 400 mg tablet; Take 1 tablet (400 mg total) by mouth every 8 (eight) hours as needed for mild pain or moderate pain  -     gabapentin (NEURONTIN) 600 MG tablet; Take 1 tablet (600 mg total) by mouth 3 (three) times a day    7  Vitamin D deficiency  -     Cholecalciferol (Vitamin D) 50 MCG (2000 UT) tablet; Take 1 tablet (2,000 Units total) by mouth daily    8  Recurrent major depressive disorder, in partial remission (HCC)  Assessment & Plan:  Stable  Continue Lexapro and trazodone  Continue outpatient follow-up with psychiatry    Orders:  -     escitalopram (LEXAPRO) 20 mg tablet; Take 1 tablet (20 mg total) by mouth every morning  -     buPROPion (WELLBUTRIN SR) 200 MG 12 hr tablet; Take 1 tablet (200 mg total) by mouth 2 (two) times a day    9  Sensorineural hearing loss (SNHL) of both ears  -     Ambulatory Referral to Otolaryngology; Future    10  Obesity (BMI 35 0-39 9 without comorbidity)    BMI Counseling: Body mass index is 39 94 kg/m²  The BMI is above normal  Nutrition recommendations include decreasing portion sizes, encouraging healthy choices of fruits and vegetables, decreasing fast food intake, limiting drinks that contain sugar, moderation in carbohydrate intake, reducing intake of saturated and trans fat and reducing intake of cholesterol  Exercise recommendations include moderate physical activity 150 minutes/week  Rationale for BMI follow-up plan is due to patient being overweight or obese  Subjective        45 y/o with PMH of type II DM, obesity and HTN who presents today for follow up    She has not had a follow-up in quite some time  She reports that her average blood sugar reading ambulatory is been 100-150 fasting and postprandial   Patient reports that she has made significant dietary and lifestyle changes the past few months  Otherwise she has no other acute concerns today  She is requesting refill on all her medications  She reports that her asthma has been stable  She uses her albuterol rescue inhaler only once in a while  She also has lower extremity swelling intermittently which occurs after standing for a long time  She reports that she was diagnosed with hearing loss shortly after she sustained COVID-19 infection  She was recently seen by audiologist who confirmed the diagnosis and gave her hearing aid  She is hearing well with a hearing aid  She is requesting to see an ENT for further evaluation  She does not want any vaccinations today  Counseling provided on benefit of vaccination  Patient reports that she would think about getting vaccines in the future  Review of Systems   Constitutional: Negative for activity change, appetite change, chills, diaphoresis, fatigue and fever  HENT: Positive for hearing loss  Negative for congestion, postnasal drip and rhinorrhea  Eyes: Negative for visual disturbance  Respiratory: Negative for cough, choking, shortness of breath and wheezing  Cardiovascular: Positive for leg swelling  Negative for chest pain and palpitations  Gastrointestinal: Negative for abdominal distention, abdominal pain, blood in stool, constipation, diarrhea, nausea and vomiting  Genitourinary: Negative for dysuria, hematuria and urgency  Musculoskeletal: Negative for arthralgias and gait problem  Skin: Negative for rash  Neurological: Negative for dizziness, seizures, syncope, weakness, numbness and headaches  Psychiatric/Behavioral: Negative for confusion         Current Outpatient Medications on File Prior to Visit   Medication Sig   • clonazePAM (KlonoPIN) 1 mg tablet Take 1 tablet (1 mg total) by mouth 2 (two) times a day   • fexofenadine (ALLEGRA) 60 MG tablet TAKE 1 TABLET (60 MG TOTAL) BY MOUTH IN THE MORNING  • fluticasone (FLONASE) 50 mcg/act nasal spray 1 spray into each nostril in the morning  • traZODone (DESYREL) 100 mg tablet Take 100 mg by mouth in the morning   • [DISCONTINUED] gabapentin (NEURONTIN) 600 MG tablet TAKE 1 TABLET BY MOUTH THREE TIMES A DAY   • [DISCONTINUED] hydrochlorothiazide (HYDRODIURIL) 25 mg tablet Take 1 tablet (25 mg total) by mouth daily   • [DISCONTINUED] metFORMIN (GLUCOPHAGE) 1000 MG tablet Take 1 tablet (1,000 mg total) by mouth 2 (two) times a day with meals   • [DISCONTINUED] omeprazole (PriLOSEC) 40 MG capsule TAKE 1 CAPSULE BY MOUTH EVERY DAY BEFORE BREAKFAST   • aspirin 81 mg chewable tablet Chew 81 mg daily   • azelastine (ASTELIN) 0 1 % nasal spray 1 spray into each nostril 2 (two) times a day Use in each nostril as directed   • benzonatate (TESSALON) 200 MG capsule Take 1 capsule (200 mg total) by mouth 3 (three) times a day as needed for cough   • lidocaine (Lidoderm) 5 % Apply 1 patch topically in the morning  Remove & Discard patch within 12 hours or as directed by MD    • Melatonin 5 MG TABS Take by mouth   • methocarbamol (ROBAXIN) 750 mg tablet Take 1 tablet (750 mg total) by mouth every 8 (eight) hours   • Misc  Devices (PULSE OXIMETER) MISC by Does not apply route daily   • nystatin (MYCOSTATIN) 500,000 units/5 mL suspension Apply 5 mL (500,000 Units total) to the mouth or throat 4 (four) times a day   • [DISCONTINUED] Advair -21 MCG/ACT inhaler INHALE 2 PUFFS BY MOUTH 2 TIMES A DAY RINSE MOUTH AFTER USE     • [DISCONTINUED] albuterol (2 5 mg/3 mL) 0 083 % nebulizer solution Take 3 mL (2 5 mg total) by nebulization every 6 (six) hours as needed for wheezing or shortness of breath   • [DISCONTINUED] albuterol (PROVENTIL HFA,VENTOLIN HFA) 90 mcg/act inhaler Inhale 1 puff every 6 (six) hours as needed for wheezing   • [DISCONTINUED] buPROPion (WELLBUTRIN SR) 200 MG 12 hr tablet daily    • [DISCONTINUED] Cholecalciferol (VITAMIN D) 50 MCG (2000 UT) tablet Take 1 tablet (2,000 Units total) by mouth daily   • [DISCONTINUED] clonazePAM (KlonoPIN) 0 5 mg tablet    • [DISCONTINUED] ergocalciferol (VITAMIN D2) 50,000 units Take 1 capsule (50,000 Units total) by mouth once a week   • [DISCONTINUED] ergocalciferol (VITAMIN D2) 50,000 units Take 1 capsule (50,000 Units total) by mouth once a week   • [DISCONTINUED] escitalopram (LEXAPRO) 20 mg tablet Take 20 mg by mouth every morning   • [DISCONTINUED] ibuprofen (MOTRIN) 600 mg tablet TAKE 1 TABLET BY MOUTH EVERY 6 HOURS AS NEEDED FOR DISCOMFORT   • [DISCONTINUED] montelukast (SINGULAIR) 10 mg tablet Take 1 tablet (10 mg total) by mouth daily at bedtime       Objective     /80 (BP Location: Left arm, Patient Position: Sitting, Cuff Size: Large)   Pulse 71   Temp 98 7 °F (37 1 °C) (Tympanic)   Resp 16   Ht 5' 5" (1 651 m)   Wt 109 kg (240 lb)   LMP 09/01/2019 (Approximate)   SpO2 98%   BMI 39 94 kg/m²     Physical Exam  Vitals and nursing note reviewed  Constitutional:       General: She is not in acute distress  Appearance: Normal appearance  She is well-developed  She is obese  She is not ill-appearing, toxic-appearing or diaphoretic  HENT:      Head: Normocephalic and atraumatic  Right Ear: External ear normal       Left Ear: External ear normal       Nose: Nose normal       Mouth/Throat:      Mouth: Mucous membranes are moist       Pharynx: No oropharyngeal exudate or posterior oropharyngeal erythema  Eyes:      General: No scleral icterus  Right eye: No discharge  Left eye: No discharge  Extraocular Movements: Extraocular movements intact  Conjunctiva/sclera: Conjunctivae normal    Neck:      Thyroid: No thyromegaly  Vascular: No JVD  Trachea: No tracheal deviation     Cardiovascular: Rate and Rhythm: Normal rate and regular rhythm  Pulses: no weak pulses          Dorsalis pedis pulses are 2+ on the right side and 2+ on the left side  Posterior tibial pulses are 2+ on the right side and 2+ on the left side  Heart sounds: Normal heart sounds  No murmur heard  No friction rub  No gallop  Pulmonary:      Effort: Pulmonary effort is normal  No respiratory distress  Breath sounds: Normal breath sounds  No stridor  No wheezing or rhonchi  Abdominal:      General: Bowel sounds are normal  There is no distension  Palpations: Abdomen is soft  There is no mass  Tenderness: There is no abdominal tenderness  There is no guarding or rebound  Hernia: No hernia is present  Musculoskeletal:         General: Normal range of motion  Cervical back: Normal range of motion and neck supple  Right lower leg: No edema  Left lower leg: No edema  Comments: Trace pitting edema bilaterally   Feet:      Right foot:      Skin integrity: No ulcer, skin breakdown, erythema, warmth, callus or dry skin  Left foot:      Skin integrity: No ulcer, skin breakdown, erythema, warmth, callus or dry skin  Skin:     General: Skin is warm  Capillary Refill: Capillary refill takes less than 2 seconds  Findings: No lesion or rash  Comments: Lower extremity varicose veins bilaterally  Neurological:      General: No focal deficit present  Mental Status: She is alert and oriented to person, place, and time  Cranial Nerves: No cranial nerve deficit  Motor: No abnormal muscle tone  Psychiatric:         Mood and Affect: Mood normal          Behavior: Behavior normal           Diabetic Foot Exam    Patient's shoes and socks removed  Right Foot/Ankle   Right Foot Inspection  Skin Exam: skin normal and skin intact  No dry skin, no warmth, no callus, no erythema, no maceration, no abnormal color, no pre-ulcer, no ulcer and no callus  Toe Exam: ROM and strength within normal limits  No swelling, no tenderness, erythema and  no right toe deformity    Sensory   Proprioception: intact  Monofilament testing: intact    Vascular  Capillary refills: < 3 seconds  The right DP pulse is 2+  The right PT pulse is 2+  Left Foot/Ankle  Left Foot Inspection  Skin Exam: skin normal and skin intact  No dry skin, no warmth, no erythema, no maceration, normal color, no pre-ulcer, no ulcer and no callus  Toe Exam: ROM and strength within normal limits  No swelling, no tenderness, no erythema and no left toe deformity  Sensory   Proprioception: intact  Monofilament testing: intact    Vascular  Capillary refills: < 3 seconds  The left DP pulse is 2+  The left PT pulse is 2+       Assign Risk Category  No deformity present  No loss of protective sensation  No weak pulses  Risk: 0    Aaron Culver MD

## 2023-01-31 ENCOUNTER — OFFICE VISIT (OUTPATIENT)
Dept: AUDIOLOGY | Age: 48
End: 2023-01-31

## 2023-01-31 DIAGNOSIS — H90.3 SENSORY HEARING LOSS, BILATERAL: Primary | ICD-10-CM

## 2023-01-31 NOTE — PROGRESS NOTES
Hearing Aid Visit:    Name:  Sandra Fritz  :  1975  Age:  46 y o  Date of Evaluation: 23     Sandra Fritz is being seen for a hearing aid visit  Patient is fit with O[employer]on MORE 3 Mini [facility] hearing aid(s)  Right serial number [id number]  Left serial number [id number]  Warranty date:  26  (Loss/Damage and repair)  Dome/Earmold: 8 Open   : Right  / Left   : [id number]     Patient reports the left ear feels clogged  Action:  Cleaned and checked hearing aids  Changed domes from 8 closed to 8 open  Patient noted improvement  No other adjustments made today  Recommendations: Follow up in 6 months        Gi Wilkerson , CCC-A  Clinical Audiologist

## 2023-02-08 ENCOUNTER — TELEMEDICINE (OUTPATIENT)
Dept: PULMONOLOGY | Facility: CLINIC | Age: 48
End: 2023-02-08

## 2023-02-08 DIAGNOSIS — G47.33 OSA (OBSTRUCTIVE SLEEP APNEA): Primary | ICD-10-CM

## 2023-02-08 DIAGNOSIS — J45.40 MODERATE PERSISTENT ASTHMA WITHOUT COMPLICATION: ICD-10-CM

## 2023-02-08 RX ORDER — CLONAZEPAM 0.5 MG/1
0.5 TABLET ORAL 2 TIMES DAILY
COMMUNITY
Start: 2023-01-31

## 2023-02-08 NOTE — PROGRESS NOTES
Virtual Regular Visit    Verification of patient location:    Patient is located in the following state in which I hold an active license [place]      Assessment/Plan:    Problem List Items Addressed This Visit        Respiratory    Moderate asthma    TERESA (obstructive sleep apnea) - Primary     1  Moderate persistent asthma  · Symptoms are overall well controlled, however patient is only using the Advair as needed  She reports occasional shortness of breath on exertion  Proper medication use discussed in detail  She was advised to continue Advair 2 puffs twice daily, Singulair at bedtime and albuterol as needed  · CXR WNL  · NE panel with IgE > 80  She might benefit from Xolair in the future if inhalers do not work or if she has frequent exacerbations  · Follow up in 2-3 months      2  Obstructive sleep apnea  · Previous sleep study with mild obstructive sleep apnea with AHI 9 3/REM AHI 23 9  · On CPAP at home and recently evaluated in the office  She is awaiting for parts to be mailed to her house  Having trouble with the DME company due to not having an available  when she called  She now has a contact number and will be calling them today  · Will review compliance in next visit  Reason for visit is   Chief Complaint   Patient presents with   • Virtual Regular Visit        Encounter provider Myranda Pike MD    Provider located at [address] [place], [place] [id number] [phone]      Recent Visits  No visits were found meeting these conditions  Showing recent visits within past 7 days and meeting all other requirements  Today's Visits  Date Type Provider Dept   02/08/23 Telemedicine Myranda Pike MD [facility]   Showing today's visits and meeting all other requirements  Future Appointments  No visits were found meeting these conditions    Showing future appointments within next 150 days and meeting all other requirements       The patient was identified by name and date of birth  Agnieszka Haynes was informed that this is a telemedicine visit and that the visit is being conducted through the [address] [employer] platform  She agrees to proceed     My office door was closed  The patient was notified the following individuals were present in the room Dr Ivan Schneider (Attending pulmonologist)  She acknowledged consent and understanding of privacy and security of the video platform  The patient has agreed to participate and understands they can discontinue the visit at any time  Patient is aware this is a billable service  Subjective  Agnieszka Haynes is a 46 y o  female who is contacted via telemedicine for follow up regarding asthma and TERESA  HPI   Agnieszka Haynes is a 50 y o  female with a PMHx of asthma, TERESA, obesity, T2DM  Patient was last seen in the office on 11/30/2022  At that time, she was advised to continue Advair and Albuterol  CBC with no evidence of eosinophilia  NE panel negative with IgE 83  Today, patient reports she has been feeling well overall, however she does report some episodes of shortness of breath on exertion  She states she has been using the Advair inhaler only as needed, maybe a couple of times per week  She was advised on proper use at this time  Additionally, she has been having trouble communicating with the CPAP [employer] company due to a lack of English translators  She said she now has a phone number to contact a English-speaking tech whom she will be calling today  Past Medical History:   Diagnosis Date   • Anemia    • Anxiety    • Arthritis     hands, knee   • Asthma     uses inhalers for the same   No recnt flairups   • Back pain    • Carpal tunnel syndrome     Bilateral   • Constipation    • Depression    • Diabetes mellitus (HCC)    • Environmental allergies    • GERD (gastroesophageal reflux disease)    • H/O cardiac murmur    • Insomnia    • Kidney stone    • Migraine    • Psychiatric disorder     anxiety   • Pyelonephritis    • Sleep apnea, obstructive    • Wears glasses        Past Surgical History:   Procedure Laterality Date   • BACK SURGERY      States she isn't sure what she had done-possibly something to do with a lump or muscle   • CHOLECYSTECTOMY     • CYSTOSCOPY      onset: 6/1/16, resolved: 6/1/16   • ESOPHAGOGASTRODUODENOSCOPY     • GA LIG&DIV LONG SAPH VEIN SAPHFEM JUNCT/INTERRUPJ Left 12/22/2016    Procedure: LIGATION/STRIPPING VEIN, LIGATION OF ANTERIOR TRIBUTARY GREATER SAPHENOUS VEIN BRANCH;  Surgeon: Margaret Barillas DO;  Location: [facility];  Service: Vascular   • GA STAB PHLEBT VARICOSE VEINS 1 XTR 10-20 STAB INCS Left 12/22/2016    Procedure: MULTIPLE STAB PHLEBECTOMIES;  Surgeon: Margaret Barillas DO;  Location: [facility];  Service: Vascular   • GA STAB PHLEBT VARICOSE VEINS 1 XTR > 20 INCS Right 11/5/2021    Procedure: MULTIPLE STAB PHLEBECTOMIES;  Surgeon: Margaret Barillas DO;  [facility];  Service: Vascular   • TUBAL LIGATION     • VARICOSE VEIN SURGERY Left     Left leg   • VEIN LIGATION Right 6/5/2017    Procedure: LIGATION GREATER SAPHENOUS VEIN TRIBUTARY WITH STAB PHLEBECTOMIES ;  Surgeon: Margaret Barillas DO;  Location[facility];  Service:        Current Outpatient Medications   Medication Sig Dispense Refill   • albuterol (2 5 mg/3 mL) 0 083 % nebulizer solution Take 3 mL (2 5 mg total) by nebulization every 6 (six) hours as needed for wheezing or shortness of breath 30 mL 1   • albuterol (PROVENTIL HFA,VENTOLIN HFA) 90 mcg/act inhaler Inhale 1 puff every 6 (six) hours as needed for wheezing 18 g 3   • aspirin 81 mg chewable tablet Chew 81 mg daily     • azelastine (ASTELIN) 0 1 % nasal spray 1 spray into each nostril 2 (two) times a day Use in each nostril as directed 1 Bottle 1   • benzonatate (TESSALON) 200 MG capsule Take 1 capsule (200 mg total) by mouth 3 (three) times a day as needed for cough 20 capsule 3   • buPROPion (WELLBUTRIN SR) 200 MG 12 hr tablet Take 1 tablet (200 mg total) by mouth 2 (two) times a day 60 tablet 0   • Cholecalciferol (Vitamin D) 50 MCG (2000 UT) tablet Take 1 tablet (2,000 Units total) by mouth daily 90 tablet 1   • clonazePAM (KlonoPIN) 0 5 mg tablet Take 0 5 mg by mouth 2 (two) times a day     • clonazePAM (KlonoPIN) 1 mg tablet Take 1 tablet (1 mg total) by mouth 2 (two) times a day 45 tablet 0   • escitalopram (LEXAPRO) 20 mg tablet Take 1 tablet (20 mg total) by mouth every morning 90 tablet 1   • fexofenadine (ALLEGRA) 60 MG tablet TAKE 1 TABLET (60 MG TOTAL) BY MOUTH IN THE MORNING  30 tablet 1   • fluticasone (FLONASE) 50 mcg/act nasal spray 1 spray into each nostril in the morning  16 mL 3   • fluticasone-salmeterol (Advair HFA) 230-21 MCG/ACT inhaler Inhale 2 puffs 2 (two) times a day Rinse mouth after use  12 g 2   • gabapentin (NEURONTIN) 600 MG tablet Take 1 tablet (600 mg total) by mouth 3 (three) times a day 90 tablet 3   • hydrochlorothiazide (HYDRODIURIL) 25 mg tablet Take 1 tablet (25 mg total) by mouth daily as needed (leg swelling) 90 tablet 1   • ibuprofen (MOTRIN) 400 mg tablet Take 1 tablet (400 mg total) by mouth every 8 (eight) hours as needed for mild pain or moderate pain 60 tablet 0   • lidocaine (Lidoderm) 5 % Apply 1 patch topically in the morning  Remove & Discard patch within 12 hours or as directed by MD  21 patch 1   • Melatonin 5 MG TABS Take by mouth     • metFORMIN (GLUCOPHAGE) 1000 MG tablet Take 1 tablet (1,000 mg total) by mouth 2 (two) times a day with meals 90 tablet 0   • methocarbamol (ROBAXIN) 750 mg tablet Take 1 tablet (750 mg total) by mouth every 8 (eight) hours 90 tablet 3   • Misc   Devices (PULSE OXIMETER) MISC by Does not apply route daily 1 each 0   • montelukast (SINGULAIR) 10 mg tablet Take 1 tablet (10 mg total) by mouth daily at bedtime 30 tablet 1   • pantoprazole (PROTONIX) 40 mg tablet Take 1 tablet (40 mg total) by mouth daily 90 tablet 1   • traZODone (DESYREL) 100 mg tablet Take 100 mg by mouth in the morning       No current facility-administered medications for this visit  No Known Allergies    Review of Systems   Constitutional: Negative for fever  HENT: Positive for rhinorrhea  Negative for postnasal drip and trouble swallowing  Respiratory: Positive for shortness of breath  Cardiovascular: Negative  Gastrointestinal: Negative  Musculoskeletal: Positive for myalgias  Neurological: Negative for dizziness, weakness and light-headedness  Psychiatric/Behavioral: Negative  Video Exam    There were no vitals filed for this visit  Physical Exam  Constitutional:       General: She is not in acute distress  Appearance: She is not ill-appearing or toxic-appearing  HENT:      Head: Normocephalic  Eyes:      General: No scleral icterus  Conjunctiva/sclera: Conjunctivae normal    Pulmonary:      Effort: Pulmonary effort is normal       Comments: No evidence of respiratory distress during video call  Patient is able to speak in full sentences without any difficulty  Skin:     Coloration: Skin is not pale  Findings: No rash  Neurological:      Mental Status: She is alert and oriented to person, place, and time     Psychiatric:         Mood and Affect: Mood normal             I spent 10 minutes directly with the patient during this visit

## 2023-02-21 DIAGNOSIS — M54.41 CHRONIC BILATERAL LOW BACK PAIN WITH BILATERAL SCIATICA: ICD-10-CM

## 2023-02-21 DIAGNOSIS — M54.42 CHRONIC BILATERAL LOW BACK PAIN WITH BILATERAL SCIATICA: ICD-10-CM

## 2023-02-21 DIAGNOSIS — G89.29 CHRONIC BILATERAL LOW BACK PAIN WITH BILATERAL SCIATICA: ICD-10-CM

## 2023-02-23 RX ORDER — IBUPROFEN 400 MG/1
400 TABLET ORAL EVERY 8 HOURS PRN
Qty: 60 TABLET | Refills: 0 | Status: SHIPPED | OUTPATIENT
Start: 2023-02-23

## 2023-02-26 DIAGNOSIS — E11.9 TYPE 2 DIABETES MELLITUS WITHOUT COMPLICATION, WITHOUT LONG-TERM CURRENT USE OF INSULIN (HCC): ICD-10-CM

## 2023-02-26 DIAGNOSIS — F33.41 RECURRENT MAJOR DEPRESSIVE DISORDER, IN PARTIAL REMISSION (HCC): ICD-10-CM

## 2023-02-26 RX ORDER — BUPROPION HYDROCHLORIDE 200 MG/1
TABLET, EXTENDED RELEASE ORAL
Qty: 60 TABLET | Refills: 0 | Status: SHIPPED | OUTPATIENT
Start: 2023-02-26

## 2023-03-13 DIAGNOSIS — M54.42 CHRONIC BILATERAL LOW BACK PAIN WITH BILATERAL SCIATICA: ICD-10-CM

## 2023-03-13 DIAGNOSIS — G89.29 CHRONIC BILATERAL LOW BACK PAIN WITH BILATERAL SCIATICA: ICD-10-CM

## 2023-03-13 DIAGNOSIS — M54.41 CHRONIC BILATERAL LOW BACK PAIN WITH BILATERAL SCIATICA: ICD-10-CM

## 2023-03-14 RX ORDER — IBUPROFEN 400 MG/1
400 TABLET ORAL EVERY 8 HOURS PRN
Qty: 60 TABLET | Refills: 0 | Status: SHIPPED | OUTPATIENT
Start: 2023-03-14

## 2023-03-15 DIAGNOSIS — R05.9 COUGH: ICD-10-CM

## 2023-03-15 DIAGNOSIS — J30.89 ENVIRONMENTAL AND SEASONAL ALLERGIES: ICD-10-CM

## 2023-03-15 DIAGNOSIS — J45.40 MODERATE PERSISTENT ASTHMA WITHOUT COMPLICATION: ICD-10-CM

## 2023-03-16 RX ORDER — FLUTICASONE PROPIONATE 50 MCG
1 SPRAY, SUSPENSION (ML) NASAL DAILY
Qty: 16 ML | Refills: 3 | Status: SHIPPED | OUTPATIENT
Start: 2023-03-16

## 2023-03-16 RX ORDER — FEXOFENADINE HYDROCHLORIDE 60 MG/1
60 TABLET, FILM COATED ORAL DAILY
Qty: 30 TABLET | Refills: 1 | Status: SHIPPED | OUTPATIENT
Start: 2023-03-16

## 2023-04-26 DIAGNOSIS — J45.40 MODERATE PERSISTENT ASTHMA WITHOUT COMPLICATION: ICD-10-CM

## 2023-04-26 RX ORDER — MONTELUKAST SODIUM 10 MG/1
TABLET ORAL
Qty: 30 TABLET | Refills: 1 | Status: SHIPPED | OUTPATIENT
Start: 2023-04-26 | End: 2023-04-28 | Stop reason: SDUPTHER

## 2023-04-28 DIAGNOSIS — E11.9 TYPE 2 DIABETES MELLITUS WITHOUT COMPLICATION, WITHOUT LONG-TERM CURRENT USE OF INSULIN (HCC): ICD-10-CM

## 2023-04-28 DIAGNOSIS — J30.89 ENVIRONMENTAL AND SEASONAL ALLERGIES: ICD-10-CM

## 2023-04-28 DIAGNOSIS — M54.42 CHRONIC BILATERAL LOW BACK PAIN WITH BILATERAL SCIATICA: ICD-10-CM

## 2023-04-28 DIAGNOSIS — M54.41 CHRONIC BILATERAL LOW BACK PAIN WITH BILATERAL SCIATICA: ICD-10-CM

## 2023-04-28 DIAGNOSIS — J45.40 MODERATE PERSISTENT ASTHMA WITHOUT COMPLICATION: ICD-10-CM

## 2023-04-28 DIAGNOSIS — E55.9 VITAMIN D DEFICIENCY: ICD-10-CM

## 2023-04-28 DIAGNOSIS — G89.29 CHRONIC BILATERAL LOW BACK PAIN WITH BILATERAL SCIATICA: ICD-10-CM

## 2023-04-28 DIAGNOSIS — M79.89 LEG SWELLING: ICD-10-CM

## 2023-04-28 DIAGNOSIS — K21.9 GASTROESOPHAGEAL REFLUX DISEASE WITHOUT ESOPHAGITIS: ICD-10-CM

## 2023-04-28 DIAGNOSIS — R05.9 COUGH: ICD-10-CM

## 2023-05-01 RX ORDER — IBUPROFEN 400 MG/1
400 TABLET ORAL EVERY 8 HOURS PRN
Qty: 60 TABLET | Refills: 0 | Status: SHIPPED | OUTPATIENT
Start: 2023-05-01

## 2023-05-01 RX ORDER — FLUTICASONE PROPIONATE 50 MCG
1 SPRAY, SUSPENSION (ML) NASAL DAILY
Qty: 16 ML | Refills: 0 | Status: SHIPPED | OUTPATIENT
Start: 2023-05-01

## 2023-05-01 RX ORDER — ALBUTEROL SULFATE 2.5 MG/3ML
2.5 SOLUTION RESPIRATORY (INHALATION) EVERY 6 HOURS PRN
Qty: 30 ML | Refills: 0 | Status: SHIPPED | OUTPATIENT
Start: 2023-05-01

## 2023-05-01 RX ORDER — FEXOFENADINE HYDROCHLORIDE 60 MG/1
60 TABLET, FILM COATED ORAL DAILY
Qty: 30 TABLET | Refills: 0 | Status: SHIPPED | OUTPATIENT
Start: 2023-05-01

## 2023-05-01 RX ORDER — MONTELUKAST SODIUM 10 MG/1
10 TABLET ORAL
Qty: 30 TABLET | Refills: 0 | Status: SHIPPED | OUTPATIENT
Start: 2023-05-01

## 2023-05-01 RX ORDER — HYDROCHLOROTHIAZIDE 25 MG/1
25 TABLET ORAL DAILY PRN
Qty: 90 TABLET | Refills: 0 | Status: SHIPPED | OUTPATIENT
Start: 2023-05-01

## 2023-05-01 RX ORDER — FLUTICASONE PROPIONATE AND SALMETEROL XINAFOATE 230; 21 UG/1; UG/1
2 AEROSOL, METERED RESPIRATORY (INHALATION) 2 TIMES DAILY
Qty: 12 G | Refills: 0 | Status: SHIPPED | OUTPATIENT
Start: 2023-05-01

## 2023-05-01 RX ORDER — CHOLECALCIFEROL (VITAMIN D3) 50 MCG
2000 TABLET ORAL DAILY
Qty: 90 TABLET | Refills: 0 | Status: SHIPPED | OUTPATIENT
Start: 2023-05-01

## 2023-05-01 RX ORDER — GABAPENTIN 600 MG/1
600 TABLET ORAL 3 TIMES DAILY
Qty: 90 TABLET | Refills: 0 | Status: SHIPPED | OUTPATIENT
Start: 2023-05-01

## 2023-05-01 RX ORDER — PANTOPRAZOLE SODIUM 40 MG/1
40 TABLET, DELAYED RELEASE ORAL DAILY
Qty: 90 TABLET | Refills: 0 | Status: SHIPPED | OUTPATIENT
Start: 2023-05-01

## 2023-05-01 RX ORDER — LIDOCAINE 50 MG/G
1 PATCH TOPICAL DAILY
Qty: 21 PATCH | Refills: 0 | Status: SHIPPED | OUTPATIENT
Start: 2023-05-01

## 2023-05-01 RX ORDER — METHOCARBAMOL 750 MG/1
750 TABLET, FILM COATED ORAL EVERY 8 HOURS SCHEDULED
Qty: 90 TABLET | Refills: 0 | Status: SHIPPED | OUTPATIENT
Start: 2023-05-01

## 2023-05-01 RX ORDER — ALBUTEROL SULFATE 90 UG/1
1 AEROSOL, METERED RESPIRATORY (INHALATION) EVERY 6 HOURS PRN
Qty: 18 G | Refills: 0 | Status: SHIPPED | OUTPATIENT
Start: 2023-05-01

## 2023-05-08 ENCOUNTER — APPOINTMENT (OUTPATIENT)
Dept: LAB | Facility: CLINIC | Age: 48
End: 2023-05-08

## 2023-05-08 ENCOUNTER — OFFICE VISIT (OUTPATIENT)
Dept: FAMILY MEDICINE CLINIC | Facility: CLINIC | Age: 48
End: 2023-05-08

## 2023-05-08 VITALS
WEIGHT: 240 LBS | SYSTOLIC BLOOD PRESSURE: 122 MMHG | DIASTOLIC BLOOD PRESSURE: 80 MMHG | RESPIRATION RATE: 16 BRPM | HEART RATE: 85 BPM | OXYGEN SATURATION: 96 % | TEMPERATURE: 96.3 F | HEIGHT: 65 IN | BODY MASS INDEX: 39.99 KG/M2

## 2023-05-08 DIAGNOSIS — Z23 ENCOUNTER FOR IMMUNIZATION: ICD-10-CM

## 2023-05-08 DIAGNOSIS — E55.9 VITAMIN D DEFICIENCY: ICD-10-CM

## 2023-05-08 DIAGNOSIS — E66.9 OBESITY (BMI 35.0-39.9 WITHOUT COMORBIDITY): ICD-10-CM

## 2023-05-08 DIAGNOSIS — E11.9 TYPE 2 DIABETES MELLITUS WITHOUT COMPLICATION, WITHOUT LONG-TERM CURRENT USE OF INSULIN (HCC): Primary | ICD-10-CM

## 2023-05-08 DIAGNOSIS — E11.9 NEW ONSET TYPE 2 DIABETES MELLITUS (HCC): ICD-10-CM

## 2023-05-08 DIAGNOSIS — E11.9 TYPE 2 DIABETES MELLITUS WITHOUT COMPLICATION, WITHOUT LONG-TERM CURRENT USE OF INSULIN (HCC): ICD-10-CM

## 2023-05-08 DIAGNOSIS — Z11.59 ENCOUNTER FOR HEPATITIS C SCREENING TEST FOR LOW RISK PATIENT: ICD-10-CM

## 2023-05-08 PROBLEM — R05.9 COUGH: Status: RESOLVED | Noted: 2020-08-26 | Resolved: 2023-05-08

## 2023-05-08 PROBLEM — R31.9 HEMATURIA: Status: RESOLVED | Noted: 2020-01-10 | Resolved: 2023-05-08

## 2023-05-08 PROBLEM — U07.1 COVID-19: Status: RESOLVED | Noted: 2020-05-26 | Resolved: 2023-05-08

## 2023-05-08 LAB
25(OH)D3 SERPL-MCNC: 35.7 NG/ML (ref 30–100)
ALBUMIN SERPL BCP-MCNC: 3.2 G/DL (ref 3.5–5)
ALP SERPL-CCNC: 111 U/L (ref 46–116)
ALT SERPL W P-5'-P-CCNC: 52 U/L (ref 12–78)
ANION GAP SERPL CALCULATED.3IONS-SCNC: 2 MMOL/L (ref 4–13)
AST SERPL W P-5'-P-CCNC: 37 U/L (ref 5–45)
BASOPHILS # BLD AUTO: 0.03 THOUSANDS/ÂΜL (ref 0–0.1)
BASOPHILS NFR BLD AUTO: 0 % (ref 0–1)
BILIRUB SERPL-MCNC: 0.46 MG/DL (ref 0.2–1)
BUN SERPL-MCNC: 12 MG/DL (ref 5–25)
CALCIUM ALBUM COR SERPL-MCNC: 9.6 MG/DL (ref 8.3–10.1)
CALCIUM SERPL-MCNC: 9 MG/DL (ref 8.3–10.1)
CHLORIDE SERPL-SCNC: 107 MMOL/L (ref 96–108)
CHOLEST SERPL-MCNC: 185 MG/DL
CO2 SERPL-SCNC: 30 MMOL/L (ref 21–32)
CREAT SERPL-MCNC: 0.77 MG/DL (ref 0.6–1.3)
CREAT UR-MCNC: 150 MG/DL
EOSINOPHIL # BLD AUTO: 0.12 THOUSAND/ÂΜL (ref 0–0.61)
EOSINOPHIL NFR BLD AUTO: 2 % (ref 0–6)
ERYTHROCYTE [DISTWIDTH] IN BLOOD BY AUTOMATED COUNT: 12 % (ref 11.6–15.1)
GFR SERPL CREATININE-BSD FRML MDRD: 90 ML/MIN/1.73SQ M
GLUCOSE P FAST SERPL-MCNC: 122 MG/DL (ref 65–99)
HCT VFR BLD AUTO: 40.4 % (ref 34.8–46.1)
HDLC SERPL-MCNC: 49 MG/DL
HGB BLD-MCNC: 12.9 G/DL (ref 11.5–15.4)
IMM GRANULOCYTES # BLD AUTO: 0.01 THOUSAND/UL (ref 0–0.2)
IMM GRANULOCYTES NFR BLD AUTO: 0 % (ref 0–2)
LDLC SERPL CALC-MCNC: 117 MG/DL (ref 0–100)
LYMPHOCYTES # BLD AUTO: 2.72 THOUSANDS/ÂΜL (ref 0.6–4.47)
LYMPHOCYTES NFR BLD AUTO: 37 % (ref 14–44)
MCH RBC QN AUTO: 30.4 PG (ref 26.8–34.3)
MCHC RBC AUTO-ENTMCNC: 31.9 G/DL (ref 31.4–37.4)
MCV RBC AUTO: 95 FL (ref 82–98)
MICROALBUMIN UR-MCNC: 17.9 MG/L (ref 0–20)
MICROALBUMIN/CREAT 24H UR: 12 MG/G CREATININE (ref 0–30)
MONOCYTES # BLD AUTO: 0.5 THOUSAND/ÂΜL (ref 0.17–1.22)
MONOCYTES NFR BLD AUTO: 7 % (ref 4–12)
NEUTROPHILS # BLD AUTO: 4.05 THOUSANDS/ÂΜL (ref 1.85–7.62)
NEUTS SEG NFR BLD AUTO: 54 % (ref 43–75)
NONHDLC SERPL-MCNC: 136 MG/DL
NRBC BLD AUTO-RTO: 0 /100 WBCS
PLATELET # BLD AUTO: 214 THOUSANDS/UL (ref 149–390)
PMV BLD AUTO: 11 FL (ref 8.9–12.7)
POTASSIUM SERPL-SCNC: 4.3 MMOL/L (ref 3.5–5.3)
PROT SERPL-MCNC: 7.5 G/DL (ref 6.4–8.4)
RBC # BLD AUTO: 4.25 MILLION/UL (ref 3.81–5.12)
SL AMB POCT HEMOGLOBIN AIC: 6.8 (ref ?–6.5)
SODIUM SERPL-SCNC: 139 MMOL/L (ref 135–147)
TRIGL SERPL-MCNC: 94 MG/DL
TSH SERPL DL<=0.05 MIU/L-ACNC: 0.59 UIU/ML (ref 0.45–4.5)
WBC # BLD AUTO: 7.43 THOUSAND/UL (ref 4.31–10.16)

## 2023-05-08 RX ORDER — ROSUVASTATIN CALCIUM 10 MG/1
10 TABLET, COATED ORAL DAILY
Qty: 30 TABLET | Refills: 5 | Status: SHIPPED | OUTPATIENT
Start: 2023-05-08

## 2023-05-08 NOTE — PROGRESS NOTES
Name: Soraida Velasquez      : 1975      MRN: [id number]  Encounter Provider: Radha Romero MD  Encounter Date: 2023   Encounter department: [address] [place] [address]     1  Type 2 diabetes mellitus without complication, without long-term current use of insulin ([facility])  Assessment & Plan:    Lab Results   Component Value Date    HGBA1C 6 8 (A) 2023   -Well-controlled  -Patient reports significant dietary and lifestyle changes  -Continue metformin 1000 mg twice daily  -Recommend annual podiatry and ophthalmology evaluation      Orders:  -     POCT hemoglobin A1c  -     rosuvastatin (CRESTOR) 10 MG tablet; Take 1 tablet (10 mg total) by mouth daily    2  Encounter for hepatitis C screening test for low risk patient  -     Hepatitis C antibody; Future    3  Encounter for immunization  -     Pneumococcal Conjugate Vaccine 20-valent (Pcv20)    4  Obesity (BMI 35 0-39 9 without comorbidity)  Assessment & Plan:  Dietary and exercise counseling provided      5  Vitamin D deficiency  -     Vitamin D 25 hydroxy; Future         Subjective      49-year-old female with a past medical history of type 2 diabetes and obesity who presents today for follow-up  She has no acute concerns today  She reports that she has been compliant with her low carbohydrate diet for type 2 diabetes  She is taking her medications regularly  She has no acute concerns today  Review of Systems   Constitutional: Negative for chills, diaphoresis, fatigue and fever  HENT: Negative for congestion, postnasal drip and rhinorrhea  Eyes: Negative for visual disturbance  Respiratory: Negative for cough, shortness of breath and wheezing  Cardiovascular: Negative for chest pain, palpitations and leg swelling  Gastrointestinal: Negative for abdominal pain, blood in stool, constipation, diarrhea, nausea and vomiting     Genitourinary: Negative for dysuria, hematuria and urgency  Musculoskeletal: Negative for arthralgias and gait problem  Skin: Negative for rash  Neurological: Negative for syncope, weakness, numbness and headaches  Hematological: Negative for adenopathy  Psychiatric/Behavioral: Negative for agitation  Current Outpatient Medications on File Prior to Visit   Medication Sig   • albuterol (2 5 mg/3 mL) 0 083 % nebulizer solution Take 3 mL (2 5 mg total) by nebulization every 6 (six) hours as needed for wheezing or shortness of breath   • albuterol (PROVENTIL HFA,VENTOLIN HFA) 90 mcg/act inhaler Inhale 1 puff every 6 (six) hours as needed for wheezing   • Cholecalciferol (Vitamin D) 50 MCG (2000 UT) tablet Take 1 tablet (2,000 Units total) by mouth daily   • fexofenadine (ALLEGRA) 60 MG tablet Take 1 tablet (60 mg total) by mouth daily   • fluticasone (FLONASE) 50 mcg/act nasal spray 1 spray into each nostril daily   • fluticasone-salmeterol (Advair HFA) 230-21 MCG/ACT inhaler Inhale 2 puffs 2 (two) times a day Rinse mouth after use     • gabapentin (NEURONTIN) 600 MG tablet Take 1 tablet (600 mg total) by mouth 3 (three) times a day   • hydrochlorothiazide (HYDRODIURIL) 25 mg tablet Take 1 tablet (25 mg total) by mouth daily as needed (leg swelling)   • ibuprofen (MOTRIN) 400 mg tablet Take 1 tablet (400 mg total) by mouth every 8 (eight) hours as needed for mild pain or moderate pain   • lidocaine (Lidoderm) 5 % Apply 1 patch topically over 12 hours daily Remove & Discard patch within 12 hours or as directed by MD   • metFORMIN (GLUCOPHAGE) 1000 MG tablet Take 1 tablet (1,000 mg total) by mouth 2 (two) times a day with meals   • methocarbamol (ROBAXIN) 750 mg tablet Take 1 tablet (750 mg total) by mouth every 8 (eight) hours   • montelukast (SINGULAIR) 10 mg tablet Take 1 tablet (10 mg total) by mouth daily at bedtime   • pantoprazole (PROTONIX) 40 mg tablet Take 1 tablet (40 mg total) by mouth daily   • aspirin 81 mg chewable tablet Chew 81 mg daily   • "azelastine (ASTELIN) 0 1 % nasal spray 1 spray into each nostril 2 (two) times a day Use in each nostril as directed   • benzonatate (TESSALON) 200 MG capsule Take 1 capsule (200 mg total) by mouth 3 (three) times a day as needed for cough   • buPROPion (WELLBUTRIN SR) 200 MG 12 hr tablet TAKE 1 TABLET (200 MG TOTAL) BY MOUTH 2 TIMES A DAY  • clonazePAM (KlonoPIN) 0 5 mg tablet Take 0 5 mg by mouth 2 (two) times a day   • clonazePAM (KlonoPIN) 1 mg tablet Take 1 tablet (1 mg total) by mouth 2 (two) times a day   • escitalopram (LEXAPRO) 20 mg tablet Take 1 tablet (20 mg total) by mouth every morning   • Melatonin 5 MG TABS Take by mouth   • Misc  Devices (PULSE OXIMETER) MISC by Does not apply route daily   • traZODone (DESYREL) 100 mg tablet Take 100 mg by mouth in the morning       Objective     /80 (BP Location: Left arm, Patient Position: Sitting, Cuff Size: Large)   Pulse 85   Temp (!) 96 3 °F (35 7 °C) (Temporal)   Resp 16   Ht 5' 5\" (1 651 m)   Wt 109 kg (240 lb)   LMP 09/01/2019 (Approximate)   SpO2 96%   BMI 39 94 kg/m²     Physical Exam  Vitals and nursing note reviewed  Constitutional:       General: She is not in acute distress  Appearance: Normal appearance  She is well-developed  She is not ill-appearing, toxic-appearing or diaphoretic  HENT:      Head: Normocephalic and atraumatic  Right Ear: External ear normal       Left Ear: External ear normal       Nose: Nose normal       Mouth/Throat:      Mouth: Mucous membranes are moist       Pharynx: No oropharyngeal exudate or posterior oropharyngeal erythema  Eyes:      General: No scleral icterus  Right eye: No discharge  Left eye: No discharge  Extraocular Movements: Extraocular movements intact  Conjunctiva/sclera: Conjunctivae normal    Neck:      Thyroid: No thyromegaly  Vascular: No JVD  Trachea: No tracheal deviation     Cardiovascular:      Rate and Rhythm: Normal rate and regular " rhythm  Pulses:           Dorsalis pedis pulses are 2+ on the right side and 2+ on the left side  Posterior tibial pulses are 2+ on the right side and 2+ on the left side  Heart sounds: Normal heart sounds  No murmur heard  No friction rub  No gallop  Pulmonary:      Effort: Pulmonary effort is normal  No respiratory distress  Breath sounds: Normal breath sounds  No stridor  No wheezing or rhonchi  Abdominal:      General: Bowel sounds are normal  There is no distension  Palpations: Abdomen is soft  There is no mass  Tenderness: There is no abdominal tenderness  There is no guarding or rebound  Hernia: No hernia is present  Musculoskeletal:         General: Normal range of motion  Cervical back: Normal range of motion and neck supple  Right lower leg: No edema  Left lower leg: No edema  Feet:      Right foot:      Skin integrity: No ulcer, skin breakdown, erythema, warmth, callus or dry skin  Left foot:      Skin integrity: No ulcer, skin breakdown, erythema, warmth, callus or dry skin  Skin:     General: Skin is warm  Capillary Refill: Capillary refill takes less than 2 seconds  Findings: No lesion or rash  Neurological:      General: No focal deficit present  Mental Status: She is alert and oriented to person, place, and time  Cranial Nerves: No cranial nerve deficit  Motor: No weakness or abnormal muscle tone        Coordination: Coordination normal       Gait: Gait normal    Psychiatric:         Mood and Affect: Mood normal          Behavior: Behavior normal        Michlele Harper MD

## 2023-05-08 NOTE — ASSESSMENT & PLAN NOTE
Lab Results   Component Value Date    HGBA1C 6 8 (A) 05/08/2023   -Well-controlled  -Patient reports significant dietary and lifestyle changes  -Continue metformin 1000 mg twice daily  -Recommend annual podiatry and ophthalmology evaluation  -Will initiate statin

## 2023-05-09 LAB
EST. AVERAGE GLUCOSE BLD GHB EST-MCNC: 137 MG/DL
HBA1C MFR BLD: 6.4 %
HCV AB SER QL: NORMAL

## 2023-05-17 ENCOUNTER — HOSPITAL ENCOUNTER (OUTPATIENT)
Dept: MAMMOGRAPHY | Facility: CLINIC | Age: 48
Discharge: HOME | End: 2023-05-17

## 2023-05-17 ENCOUNTER — CLINICAL SUPPORT (OUTPATIENT)
Dept: FAMILY MEDICINE CLINIC | Facility: CLINIC | Age: 48
End: 2023-05-17

## 2023-05-17 VITALS — WEIGHT: 240 LBS | BODY MASS INDEX: 39.99 KG/M2 | HEIGHT: 65 IN

## 2023-05-17 DIAGNOSIS — Z12.31 ENCOUNTER FOR SCREENING MAMMOGRAM FOR BREAST CANCER: ICD-10-CM

## 2023-05-17 DIAGNOSIS — Z01.00 DIABETIC EYE EXAM (HCC): Primary | ICD-10-CM

## 2023-05-17 DIAGNOSIS — E11.9 DIABETIC EYE EXAM (HCC): Primary | ICD-10-CM

## 2023-05-17 LAB
LEFT EYE DIABETIC RETINOPATHY: NORMAL
LEFT EYE IMAGE QUALITY: NORMAL
LEFT EYE MACULAR EDEMA: NORMAL
LEFT EYE OTHER RETINOPATHY: NORMAL
RIGHT EYE DIABETIC RETINOPATHY: NORMAL
RIGHT EYE IMAGE QUALITY: NORMAL
RIGHT EYE MACULAR EDEMA: NORMAL
RIGHT EYE OTHER RETINOPATHY: NORMAL
SEVERITY (EYE EXAM): NORMAL

## 2023-06-06 DIAGNOSIS — J45.40 MODERATE PERSISTENT ASTHMA WITHOUT COMPLICATION: ICD-10-CM

## 2023-06-06 RX ORDER — FLUTICASONE PROPIONATE AND SALMETEROL XINAFOATE 230; 21 UG/1; UG/1
AEROSOL, METERED RESPIRATORY (INHALATION)
Qty: 12 G | Refills: 1 | Status: SHIPPED | OUTPATIENT
Start: 2023-06-06

## 2023-06-11 DIAGNOSIS — M54.42 CHRONIC BILATERAL LOW BACK PAIN WITH BILATERAL SCIATICA: ICD-10-CM

## 2023-06-11 DIAGNOSIS — G89.29 CHRONIC BILATERAL LOW BACK PAIN WITH BILATERAL SCIATICA: ICD-10-CM

## 2023-06-11 DIAGNOSIS — M54.41 CHRONIC BILATERAL LOW BACK PAIN WITH BILATERAL SCIATICA: ICD-10-CM

## 2023-06-14 RX ORDER — IBUPROFEN 400 MG/1
400 TABLET ORAL EVERY 8 HOURS PRN
Qty: 60 TABLET | Refills: 0 | Status: SHIPPED | OUTPATIENT
Start: 2023-06-14

## 2023-06-23 DIAGNOSIS — G89.29 CHRONIC BILATERAL LOW BACK PAIN WITH BILATERAL SCIATICA: ICD-10-CM

## 2023-06-23 DIAGNOSIS — M54.42 CHRONIC BILATERAL LOW BACK PAIN WITH BILATERAL SCIATICA: ICD-10-CM

## 2023-06-23 DIAGNOSIS — M54.41 CHRONIC BILATERAL LOW BACK PAIN WITH BILATERAL SCIATICA: ICD-10-CM

## 2023-06-23 DIAGNOSIS — E11.9 TYPE 2 DIABETES MELLITUS WITHOUT COMPLICATION, WITHOUT LONG-TERM CURRENT USE OF INSULIN (HCC): ICD-10-CM

## 2023-06-26 RX ORDER — METHOCARBAMOL 750 MG/1
750 TABLET, FILM COATED ORAL EVERY 8 HOURS SCHEDULED
Qty: 90 TABLET | Refills: 0 | Status: SHIPPED | OUTPATIENT
Start: 2023-06-26

## 2023-06-28 ENCOUNTER — CONSULT (OUTPATIENT)
Dept: FAMILY MEDICINE CLINIC | Facility: CLINIC | Age: 48
End: 2023-06-28

## 2023-06-28 VITALS
TEMPERATURE: 98.7 F | DIASTOLIC BLOOD PRESSURE: 70 MMHG | HEIGHT: 65 IN | SYSTOLIC BLOOD PRESSURE: 120 MMHG | OXYGEN SATURATION: 99 % | WEIGHT: 245 LBS | RESPIRATION RATE: 16 BRPM | BODY MASS INDEX: 40.82 KG/M2 | HEART RATE: 79 BPM

## 2023-06-28 DIAGNOSIS — Z01.818 PREOP EXAMINATION: Primary | ICD-10-CM

## 2023-06-28 PROBLEM — R73.03 PREDIABETES: Status: RESOLVED | Noted: 2020-08-14 | Resolved: 2023-06-28

## 2023-06-28 PROCEDURE — 99244 OFF/OP CNSLTJ NEW/EST MOD 40: CPT | Performed by: FAMILY MEDICINE

## 2023-06-28 NOTE — PROGRESS NOTES
FAMILY PRACTICE PRE-OPERATIVE EVALUATION  [facility] PHYSICIAN [facility] RAIMUNDO    NAME: Brooke Hammans Colon Mercado  AGE: 46 y o  SEX: female  : 1975     DATE: 2023    Family Practice Pre-Operative Evaluation      Chief Complaint: Pre-operative Evaluation     Surgery: Tooth extraction  Anticipated Date of Surgery: Tentative  Referring Provider: Karina Mack     History of Present Illness:     Keren Maria is a 46 y o  female who presents to the office today for a preoperative consultation for dental extraction   Planned anesthesia is IV sedation  Patient has a bleeding risk of: no recent abnormal bleeding  Patient does not have objections to receiving blood products if needed  Current anti-platelet/anti-coagulation medications that the patient is prescribed includes: none  Assessment of Chronic Conditions:   - Diabetes Mellitus: Stable   - Asthma: Stable     Assessment of Cardiac Risk:  · Denies unstable or severe angina or MI in the last 6 weeks or history of stent placement in the last year   · Denies decompensated heart failure (e g  New onset heart failure, NYHA functional class IV heart failure, or worsening existing heart failure)  · Denies significant arrhythmias such as high grade AV block, symptomatic ventricular arrhythmia, newly recognized ventricular tachycardia, supraventricular tachycardia with resting heart rate >100, or symptomatic bradycardia  · Denies severe heart valve disease including aortic stenosis or symptomatic mitral stenosis     Exercise Capacity:  · Able to walk 4 blocks without symptoms?: Yes  · Able to walk 2 flights without symptoms?: Yes    Prior Anesthesia Reactions: No     Personal history of venous thromboembolic disease? No    History of steroid use for >2 weeks within last year? No         Review of Systems:     Review of Systems   Constitutional: Negative for appetite change, chills, diaphoresis, fatigue and fever  HENT: Positive for dental problem  Eyes: Negative for visual disturbance  Respiratory: Negative for cough, shortness of breath and wheezing  Cardiovascular: Negative for chest pain, palpitations and leg swelling  Gastrointestinal: Negative for abdominal pain, blood in stool, constipation, diarrhea, nausea and vomiting  Endocrine: Negative for polydipsia, polyphagia and polyuria  Genitourinary: Negative for dysuria, hematuria and urgency  Musculoskeletal: Negative for arthralgias, back pain and gait problem  Skin: Negative for rash  Neurological: Negative for dizziness, syncope, weakness, numbness and headaches  Hematological: Negative for adenopathy  Psychiatric/Behavioral: Negative for confusion         Current Problem List:     Patient Active Problem List   Diagnosis   • Anxiety   • Moderate asthma   • Chronic bilateral low back pain with bilateral sciatica   • Gastroesophageal reflux disease   • Recurrent urinary tract infection   • Varicose veins of left leg with edema   • Chronic pain syndrome   • Lumbar spondylosis   • Lumbar disc herniation   • Sacroiliitis (HCC)   • Pain in both hands   • Leg swelling   • Morbid obesity ([facility])   • Recurrent major depressive disorder, in partial remission [facility]   • Vitamin D deficiency   • Environmental and seasonal allergies   • TERESA (obstructive sleep apnea)   • Apnea   • Restless leg syndrome   • Type 2 diabetes mellitus without complication, without long-term current use of insulin ([facility])   • Obesity (BMI 35 0-39 9 without comorbidity)   • Preop examination       Allergies:     No Known Allergies    Current Medications:       Current Outpatient Medications:   •  Advair -21 MCG/ACT inhaler, INHALE 2 PUFFS BY MOUTH 2 TIMES A DAY RINSE MOUTH AFTER USE , Disp: 12 g, Rfl: 1  •  albuterol (2 5 mg/3 mL) 0 083 % nebulizer solution, Take 3 mL (2 5 mg total) by nebulization every 6 (six) hours as needed for wheezing or shortness of breath, Disp: 30 mL, Rfl: 0  •  albuterol (PROVENTIL HFA,VENTOLIN HFA) 90 mcg/act inhaler, Inhale 1 puff every 6 (six) hours as needed for wheezing, Disp: 18 g, Rfl: 0  •  Cholecalciferol (Vitamin D) 50 MCG (2000 UT) tablet, Take 1 tablet (2,000 Units total) by mouth daily, Disp: 90 tablet, Rfl: 0  •  clonazePAM (KlonoPIN) 0 5 mg tablet, Take 0 5 mg by mouth 2 (two) times a day, Disp: , Rfl:   •  clonazePAM (KlonoPIN) 1 mg tablet, Take 1 tablet (1 mg total) by mouth 2 (two) times a day, Disp: 45 tablet, Rfl: 0  •  escitalopram (LEXAPRO) 20 mg tablet, Take 1 tablet (20 mg total) by mouth every morning, Disp: 90 tablet, Rfl: 1  •  fexofenadine (ALLEGRA) 60 MG tablet, Take 1 tablet (60 mg total) by mouth daily, Disp: 30 tablet, Rfl: 0  •  fluticasone (FLONASE) 50 mcg/act nasal spray, 1 spray into each nostril daily, Disp: 16 mL, Rfl: 0  •  gabapentin (NEURONTIN) 600 MG tablet, Take 1 tablet (600 mg total) by mouth 3 (three) times a day, Disp: 90 tablet, Rfl: 0  •  ibuprofen (MOTRIN) 400 mg tablet, TAKE 1 TABLET (400 MG TOTAL) BY MOUTH EVERY 8 (EIGHT) HOURS AS NEEDED FOR MILD PAIN OR MODERATE PAIN, Disp: 60 tablet, Rfl: 0  •  lidocaine (Lidoderm) 5 %, Apply 1 patch topically over 12 hours daily Remove & Discard patch within 12 hours or as directed by MD, Disp: 21 patch, Rfl: 0  •  Melatonin 5 MG TABS, Take by mouth, Disp: , Rfl:   •  metFORMIN (GLUCOPHAGE) 1000 MG tablet, TAKE 1 TABLET BY MOUTH TWICE A DAY WITH MEALS, Disp: 60 tablet, Rfl: 0  •  methocarbamol (ROBAXIN) 750 mg tablet, TAKE 1 TABLET (750 MG TOTAL) BY MOUTH EVERY 8 (EIGHT) HOURS, Disp: 90 tablet, Rfl: 0  •  montelukast (SINGULAIR) 10 mg tablet, Take 1 tablet (10 mg total) by mouth daily at bedtime, Disp: 30 tablet, Rfl: 0  •  pantoprazole (PROTONIX) 40 mg tablet, Take 1 tablet (40 mg total) by mouth daily, Disp: 90 tablet, Rfl: 0  •  rosuvastatin (CRESTOR) 10 MG tablet, Take 1 tablet (10 mg total) by mouth daily, Disp: 30 tablet, Rfl: 5  •  azelastine (ASTELIN) 0 1 % nasal spray, 1 spray into each nostril 2 (two) times a day Use in each nostril as directed, Disp: 1 Bottle, Rfl: 1  •  benzonatate (TESSALON) 200 MG capsule, Take 1 capsule (200 mg total) by mouth 3 (three) times a day as needed for cough, Disp: 20 capsule, Rfl: 3  •  buPROPion (WELLBUTRIN SR) 200 MG 12 hr tablet, TAKE 1 TABLET (200 MG TOTAL) BY MOUTH 2 TIMES A DAY , Disp: 60 tablet, Rfl: 0  •  Misc  Devices (PULSE OXIMETER) MISC, by Does not apply route daily, Disp: 1 each, Rfl: 0  •  traZODone (DESYREL) 100 mg tablet, Take 100 mg by mouth in the morning, Disp: , Rfl:     Past Medical History:       Past Medical History:   Diagnosis Date   • Anemia    • Anxiety    • Arthritis     hands, knee   • Asthma     uses inhalers for the same   No recnt flairups   • Back pain    • Carpal tunnel syndrome     Bilateral   • Constipation    • Depression    • Diabetes [facility]    • Environmental allergies    • GERD (gastroesophageal reflux disease)    • H/O cardiac murmur    • Insomnia    • Kidney stone    • Migraine    • Psychiatric disorder     anxiety   • Pyelonephritis    • Sleep apnea, obstructive    • Wears glasses         Past Surgical History:   Procedure Laterality Date   • BACK SURGERY      States she isn't sure what she had done-possibly something to do with a lump or muscle   • CHOLECYSTECTOMY     • CYSTOSCOPY      onset: 6/1/16, resolved: 6/1/16   • ESOPHAGOGASTRODUODENOSCOPY     • MO LIG&DIV LONG SAPH VEIN SAPHFEM JUNCT/INTERRUPJ Left 12/22/2016    Procedure: LIGATION/STRIPPING VEIN, LIGATION OF ANTERIOR TRIBUTARY GREATER SAPHENOUS VEIN BRANCH;  Surgeon: Nancy Alfaro DO;  Location: [facility];  Service: Vascular   • MO STAB PHLEBT VARICOSE VEINS 1 XTR 10-20 STAB INCS Left 12/22/2016    Procedure: MULTIPLE STAB PHLEBECTOMIES;  Surgeon: Nancy Alfaro DO;  Location: [facility];  Service: Vascular   • MO STAB PHLEBT VARICOSE VEINS 1 XTR > 20 INCS Right 11/5/2021    Procedure: MULTIPLE STAB PHLEBECTOMIES; Surgeon: Nanci Perez DO;  [facility];  Service: Vascular   • TUBAL LIGATION     • VARICOSE VEIN SURGERY Left     Left leg   • VEIN LIGATION Right 6/5/2017    Procedure: LIGATION GREATER SAPHENOUS VEIN TRIBUTARY WITH STAB PHLEBECTOMIES ;  Surgeon: Nanci Perez DO;  Location: [facility];  Service:         Family History   Problem Relation Age of Onset   • Asthma Mother    • Coronary artery disease Mother    • Depression Mother    • Diabetes Mother    • Hypertension Mother    • Varicose Veins Mother    • Asthma Father    • Hypertension Father    • Lung cancer Father    • Diabetes Father    • Breast cancer Sister 39   • No Known Problems Daughter    • No Known Problems Maternal Grandmother    • Colon cancer Maternal Grandfather    • No Known Problems Paternal Grandmother    • No Known Problems Paternal Grandfather    • Breast cancer Maternal Aunt 48   • Breast cancer Maternal Aunt 60   • Ovarian cancer Maternal Aunt         Social History     Socioeconomic History   • Marital status: /Civil Union     Spouse name: Not on file   • Number of children: Not on file   • Years of education: Not on file   • Highest education level: Not on file   Occupational History   • Not on file   Tobacco Use   • Smoking status: Former     Packs/day: 2 00     Years: 10 00     Total pack years: 20 00     Types: Cigarettes     Start date: 1/1/1995     Quit date: 8/1/2019     Years since quitting: 3 9   • Smokeless tobacco: Never   Vaping Use   • Vaping Use: Never used   Substance and Sexual Activity   • Alcohol use: Yes     Comment: occasionally   • Drug use: Never   • Sexual activity: Not Currently     Partners: Male     Birth control/protection: None   Other Topics Concern   • Not on file   Social History Narrative   • Not on file     Social Determinants of Health     Financial Resource Strain: Not on file   Food Insecurity: Not on file   Transportation Needs: Not on file   Physical Activity: Not on file "  Stress: Not on file   Social Connections: Not on file   Intimate Partner Violence: Not on file   Housing Stability: Not on file        Physical Exam:     /70 (BP Location: Right arm, Patient Position: Sitting, Cuff Size: Large)   Pulse 79   Temp 98 7 °F (37 1 °C) (Temporal)   Resp 16   Ht 5' 5\" (1 651 m)   Wt 111 kg (245 lb)   LMP 09/01/2019 (Approximate)   SpO2 99%   BMI 40 77 kg/m²     Physical Exam  Vitals and nursing note reviewed  Constitutional:       General: She is not in acute distress  Appearance: Normal appearance  She is well-developed  She is not ill-appearing, toxic-appearing or diaphoretic  HENT:      Head: Normocephalic and atraumatic  Right Ear: External ear normal       Left Ear: External ear normal       Nose: Nose normal       Mouth/Throat:      Mouth: Mucous membranes are moist       Dentition: Abnormal dentition  Dental caries present  Pharynx: No oropharyngeal exudate or posterior oropharyngeal erythema  Eyes:      General: No scleral icterus  Right eye: No discharge  Left eye: No discharge  Extraocular Movements: Extraocular movements intact  Conjunctiva/sclera: Conjunctivae normal    Neck:      Thyroid: No thyromegaly  Vascular: No JVD  Trachea: No tracheal deviation  Cardiovascular:      Rate and Rhythm: Normal rate and regular rhythm  Pulses:           Dorsalis pedis pulses are 2+ on the right side and 2+ on the left side  Posterior tibial pulses are 2+ on the right side and 2+ on the left side  Heart sounds: Normal heart sounds  No murmur heard  No friction rub  No gallop  Pulmonary:      Effort: Pulmonary effort is normal  No respiratory distress  Breath sounds: Normal breath sounds  No stridor  No wheezing or rhonchi  Abdominal:      General: Bowel sounds are normal  There is no distension  Palpations: Abdomen is soft  There is no mass  Tenderness:  There is no abdominal " tenderness  There is no guarding or rebound  Hernia: No hernia is present  Musculoskeletal:         General: Normal range of motion  Cervical back: Normal range of motion and neck supple  Right lower leg: No edema  Left lower leg: No edema  Feet:      Right foot:      Skin integrity: No ulcer, skin breakdown, erythema, warmth, callus or dry skin  Left foot:      Skin integrity: No ulcer, skin breakdown, erythema, warmth, callus or dry skin  Skin:     General: Skin is warm  Capillary Refill: Capillary refill takes less than 2 seconds  Findings: No lesion or rash  Neurological:      General: No focal deficit present  Mental Status: She is alert and oriented to person, place, and time  Cranial Nerves: No cranial nerve deficit  Motor: No weakness or abnormal muscle tone  Coordination: Coordination normal       Gait: Gait normal    Psychiatric:         Mood and Affect: Mood normal          Behavior: Behavior normal           Data:     Pre-operative work-up    Laboratory Results: I have personally reviewed the pertinent laboratory results/reports      EKG: I have personally reviewed pertinent reports  Previous cardiopulmonary studies within the past year:  · Echocardiogram: none  · Cardiac Catheterization: none  · Stress Test: none  · Pulmonary Function Testing: none      Assessment & Recommendations:     1  Preop examination            Pre-Op Evaluation Assessment  46 y o  female with planned surgery: Dental extraction  Known risk factors for perioperative complications: Chronic pulmonary disease  Diabetes mellitus  Current medications which may produce withdrawal symptoms if withheld perioperatively: none  Pre-Op Evaluation Plan  1  Further preoperative workup as follows:   - None; no further preoperative work-up is required    2   Medication Management/Recommendations:   - Patient has been instructed to avoid herbs or non-directed vitamins the week prior to surgery to ensure no drug interactions with perioperative surgical and anesthetic medications  - Patient should hold diuretic medication the day of surgery   - Hold metformin the morning of surgery and do not resume until 48 hours AFTER surgery to avoid risk of lactic acidosis  Do not resume if eGFR is < 30    3  Prophylaxis for cardiac events with perioperative beta-blockers: not indicated  4  Patient requires further consultation with: None    Clearance  Patient is medically stable for surgery without any additional cardiac testing       Kendall Cortez MD  [address]  [place] [facility] [address]  Phone#  [phone]  Fax#  [phone]

## 2023-06-28 NOTE — ASSESSMENT & PLAN NOTE
-Met score greater than 4  -RCRI 0  -Low risk dental procedure  -No Hx of cardiovascular disease  -Stable from diabetes and pulmonary standpoint  -Patient is medically optimized for planned procedure

## 2023-06-28 NOTE — PATIENT INSTRUCTIONS
No aspirin or motrin 1 week before surgery  Hold metformin morning of surgery while NPO   Can resume the evening after surgery

## 2023-07-08 DIAGNOSIS — J45.40 MODERATE PERSISTENT ASTHMA WITHOUT COMPLICATION: ICD-10-CM

## 2023-07-11 RX ORDER — FLUTICASONE PROPIONATE 50 MCG
SPRAY, SUSPENSION (ML) NASAL
Qty: 16 ML | Refills: 0 | Status: SHIPPED | OUTPATIENT
Start: 2023-07-11

## 2023-07-20 DIAGNOSIS — M54.41 CHRONIC BILATERAL LOW BACK PAIN WITH BILATERAL SCIATICA: ICD-10-CM

## 2023-07-20 DIAGNOSIS — K21.9 GASTROESOPHAGEAL REFLUX DISEASE WITHOUT ESOPHAGITIS: ICD-10-CM

## 2023-07-20 DIAGNOSIS — G89.29 CHRONIC BILATERAL LOW BACK PAIN WITH BILATERAL SCIATICA: ICD-10-CM

## 2023-07-20 DIAGNOSIS — M54.42 CHRONIC BILATERAL LOW BACK PAIN WITH BILATERAL SCIATICA: ICD-10-CM

## 2023-07-20 RX ORDER — METHOCARBAMOL 750 MG/1
750 TABLET, FILM COATED ORAL EVERY 8 HOURS SCHEDULED
Qty: 90 TABLET | Refills: 0 | Status: SHIPPED | OUTPATIENT
Start: 2023-07-20

## 2023-07-20 RX ORDER — PANTOPRAZOLE SODIUM 40 MG/1
40 TABLET, DELAYED RELEASE ORAL DAILY
Qty: 30 TABLET | Refills: 2 | Status: SHIPPED | OUTPATIENT
Start: 2023-07-20

## 2023-07-25 DIAGNOSIS — E11.9 TYPE 2 DIABETES MELLITUS WITHOUT COMPLICATION, WITHOUT LONG-TERM CURRENT USE OF INSULIN (HCC): ICD-10-CM

## 2023-07-29 DIAGNOSIS — F33.41 RECURRENT MAJOR DEPRESSIVE DISORDER, IN PARTIAL REMISSION (HCC): ICD-10-CM

## 2023-08-01 DIAGNOSIS — R05.9 COUGH: ICD-10-CM

## 2023-08-01 DIAGNOSIS — E55.9 VITAMIN D DEFICIENCY: ICD-10-CM

## 2023-08-01 DIAGNOSIS — G89.29 CHRONIC BILATERAL LOW BACK PAIN WITH BILATERAL SCIATICA: ICD-10-CM

## 2023-08-01 DIAGNOSIS — M54.42 CHRONIC BILATERAL LOW BACK PAIN WITH BILATERAL SCIATICA: ICD-10-CM

## 2023-08-01 DIAGNOSIS — J45.40 MODERATE PERSISTENT ASTHMA WITHOUT COMPLICATION: ICD-10-CM

## 2023-08-01 DIAGNOSIS — E11.9 TYPE 2 DIABETES MELLITUS WITHOUT COMPLICATION, WITHOUT LONG-TERM CURRENT USE OF INSULIN (HCC): ICD-10-CM

## 2023-08-01 DIAGNOSIS — G47.00 INSOMNIA, UNSPECIFIED TYPE: Primary | ICD-10-CM

## 2023-08-01 DIAGNOSIS — M54.41 CHRONIC BILATERAL LOW BACK PAIN WITH BILATERAL SCIATICA: ICD-10-CM

## 2023-08-01 DIAGNOSIS — J30.89 ENVIRONMENTAL AND SEASONAL ALLERGIES: ICD-10-CM

## 2023-08-01 DIAGNOSIS — K21.9 GASTROESOPHAGEAL REFLUX DISEASE WITHOUT ESOPHAGITIS: ICD-10-CM

## 2023-08-01 RX ORDER — LIDOCAINE 50 MG/G
1 PATCH TOPICAL DAILY
Qty: 21 PATCH | Refills: 0 | Status: SHIPPED | OUTPATIENT
Start: 2023-08-01

## 2023-08-01 RX ORDER — METHOCARBAMOL 750 MG/1
750 TABLET, FILM COATED ORAL EVERY 8 HOURS SCHEDULED
Qty: 90 TABLET | Refills: 0 | Status: SHIPPED | OUTPATIENT
Start: 2023-08-01

## 2023-08-01 RX ORDER — CHOLECALCIFEROL (VITAMIN D3) 125 MCG
5 CAPSULE ORAL
Qty: 30 TABLET | Refills: 1 | Status: SHIPPED | OUTPATIENT
Start: 2023-08-01

## 2023-08-01 RX ORDER — MONTELUKAST SODIUM 10 MG/1
10 TABLET ORAL
Qty: 30 TABLET | Refills: 2 | Status: SHIPPED | OUTPATIENT
Start: 2023-08-01

## 2023-08-01 RX ORDER — FLUTICASONE PROPIONATE AND SALMETEROL XINAFOATE 230; 21 UG/1; UG/1
2 AEROSOL, METERED RESPIRATORY (INHALATION) 2 TIMES DAILY
Qty: 12 G | Refills: 2 | Status: SHIPPED | OUTPATIENT
Start: 2023-08-01

## 2023-08-01 RX ORDER — CHOLECALCIFEROL (VITAMIN D3) 50 MCG
2000 TABLET ORAL DAILY
Qty: 90 TABLET | Refills: 1 | Status: SHIPPED | OUTPATIENT
Start: 2023-08-01

## 2023-08-01 RX ORDER — GABAPENTIN 600 MG/1
600 TABLET ORAL 3 TIMES DAILY
Qty: 90 TABLET | Refills: 1 | Status: SHIPPED | OUTPATIENT
Start: 2023-08-01

## 2023-08-01 RX ORDER — ALBUTEROL SULFATE 90 UG/1
1 AEROSOL, METERED RESPIRATORY (INHALATION) EVERY 6 HOURS PRN
Qty: 18 G | Refills: 0 | Status: SHIPPED | OUTPATIENT
Start: 2023-08-01 | End: 2023-08-25

## 2023-08-01 RX ORDER — ROSUVASTATIN CALCIUM 10 MG/1
10 TABLET, COATED ORAL DAILY
Qty: 90 TABLET | Refills: 1 | Status: SHIPPED | OUTPATIENT
Start: 2023-08-01

## 2023-08-01 RX ORDER — FEXOFENADINE HCL 60 MG/1
60 TABLET, FILM COATED ORAL DAILY
Qty: 30 TABLET | Refills: 2 | Status: SHIPPED | OUTPATIENT
Start: 2023-08-01

## 2023-08-01 RX ORDER — PANTOPRAZOLE SODIUM 40 MG/1
40 TABLET, DELAYED RELEASE ORAL DAILY
Qty: 30 TABLET | Refills: 0 | Status: SHIPPED | OUTPATIENT
Start: 2023-08-01 | End: 2023-09-13

## 2023-08-01 RX ORDER — ESCITALOPRAM OXALATE 20 MG/1
20 TABLET ORAL EVERY MORNING
Qty: 90 TABLET | Refills: 1 | Status: SHIPPED | OUTPATIENT
Start: 2023-08-01

## 2023-08-01 RX ORDER — FLUTICASONE PROPIONATE 50 MCG
2 SPRAY, SUSPENSION (ML) NASAL DAILY
Qty: 16 ML | Refills: 1 | Status: SHIPPED | OUTPATIENT
Start: 2023-08-01

## 2023-08-03 ENCOUNTER — OFFICE VISIT (OUTPATIENT)
Dept: AUDIOLOGY | Age: 48
End: 2023-08-03

## 2023-08-03 DIAGNOSIS — H90.3 SENSORY HEARING LOSS, BILATERAL: Primary | ICD-10-CM

## 2023-08-03 NOTE — PROGRESS NOTES
Hearing Aid Visit:    Name:  Celina Romero  :  1975  Age:  46 y.o. Date of Evaluation: 23     Celina Romero is being seen for a hearing aid visit. Patient is fit with OtJumpStart MORE 3 Mini [facility] hearing aid(s). Right serial number [id number]. Left serial number [id number]. Warranty date:  26  (Loss/Damage and repair). Dome/Earmold: 8 Open   : Right  / Left   : [id number]     Patient reports no issues with the hearing aids. Action:  The hearing aids were cleaned and checked. Wax guards and domes were replaced, bilaterally. A listening check revealed the hearing aids to be providing adequate amplification for the patients hearing loss. 2 packs of domes and 2 packs of wax guards were provided upon request.    Recommendations: Follow up in 6 months     Josephine Brumfield.   Clinical Audiologist

## 2023-08-08 ENCOUNTER — OFFICE VISIT (OUTPATIENT)
Dept: FAMILY MEDICINE CLINIC | Facility: CLINIC | Age: 48
End: 2023-08-08

## 2023-08-08 VITALS
HEART RATE: 99 BPM | RESPIRATION RATE: 16 BRPM | TEMPERATURE: 98.6 F | BODY MASS INDEX: 41.48 KG/M2 | HEIGHT: 65 IN | SYSTOLIC BLOOD PRESSURE: 126 MMHG | WEIGHT: 249 LBS | OXYGEN SATURATION: 99 % | DIASTOLIC BLOOD PRESSURE: 80 MMHG

## 2023-08-08 DIAGNOSIS — Z12.11 ENCOUNTER FOR SCREENING COLONOSCOPY: ICD-10-CM

## 2023-08-08 DIAGNOSIS — M25.50 POLYARTHRALGIA: ICD-10-CM

## 2023-08-08 DIAGNOSIS — G89.29 CHRONIC LEFT SHOULDER PAIN: ICD-10-CM

## 2023-08-08 DIAGNOSIS — G47.33 OSA (OBSTRUCTIVE SLEEP APNEA): ICD-10-CM

## 2023-08-08 DIAGNOSIS — M25.512 CHRONIC LEFT SHOULDER PAIN: ICD-10-CM

## 2023-08-08 DIAGNOSIS — K21.9 GASTROESOPHAGEAL REFLUX DISEASE WITHOUT ESOPHAGITIS: ICD-10-CM

## 2023-08-08 DIAGNOSIS — S00.03XD HEMATOMA OF SCALP, SUBSEQUENT ENCOUNTER: ICD-10-CM

## 2023-08-08 DIAGNOSIS — J45.40 MODERATE PERSISTENT ASTHMA WITHOUT COMPLICATION: ICD-10-CM

## 2023-08-08 DIAGNOSIS — E11.9 TYPE 2 DIABETES MELLITUS WITHOUT COMPLICATION, WITHOUT LONG-TERM CURRENT USE OF INSULIN (HCC): Primary | ICD-10-CM

## 2023-08-08 PROBLEM — S00.03XA HEMATOMA OF SCALP: Status: ACTIVE | Noted: 2023-08-08

## 2023-08-08 LAB — SL AMB POCT HEMOGLOBIN AIC: 7.7 (ref ?–6.5)

## 2023-08-08 PROCEDURE — 99215 OFFICE O/P EST HI 40 MIN: CPT | Performed by: FAMILY MEDICINE

## 2023-08-08 PROCEDURE — 3074F SYST BP LT 130 MM HG: CPT | Performed by: FAMILY MEDICINE

## 2023-08-08 PROCEDURE — 83036 HEMOGLOBIN GLYCOSYLATED A1C: CPT | Performed by: FAMILY MEDICINE

## 2023-08-08 PROCEDURE — 3079F DIAST BP 80-89 MM HG: CPT | Performed by: FAMILY MEDICINE

## 2023-08-08 NOTE — ASSESSMENT & PLAN NOTE
Lab Results   Component Value Date    HGBA1C 7.7 (A) 08/08/2023   HBA1C has increased to 7.7  Extensive dietary counseling provided  Continue metformin  Shared decision to initiate another pharmacotherapy if hemoglobin A1c remains elevated next visit  Continue statin  Recommend annual podiatry and ophthalmology evaluation

## 2023-08-08 NOTE — ASSESSMENT & PLAN NOTE
Stable  Continue PPI  Lifestyle modification counseling provided to patient including elevating head of bed at nighttime and avoiding any food triggers

## 2023-08-08 NOTE — ASSESSMENT & PLAN NOTE
Scalp hematoma after she sustained a fall a few weeks ago  Hematoma has reduced in size  Continue supportive care

## 2023-08-08 NOTE — PROGRESS NOTES
Name: Mino Henderson      : 1975      MRN: [id number]  Encounter Provider: Rasta Abbasi MD  Encounter Date: 2023   Encounter department: [address] [facility],[address]     1. Type 2 diabetes mellitus without complication, without long-term current use of insulin ([address])  Assessment & Plan:    Lab Results   Component Value Date    HGBA1C 7.7 (A) 2023   HBA1C has increased to 7.7  Extensive dietary counseling provided  Continue metformin  Shared decision to initiate another pharmacotherapy if hemoglobin A1c remains elevated next visit  Continue statin  Recommend annual podiatry and ophthalmology evaluation    Orders:  -     POCT hemoglobin A1c    2. Gastroesophageal reflux disease without esophagitis  Assessment & Plan:  Stable  Continue PPI  Lifestyle modification counseling provided to patient including elevating head of bed at nighttime and avoiding any food triggers      3. Moderate persistent asthma without complication  Assessment & Plan:  Stable  No recent exacerbation  Continue albuterol, Advair and montelukast      4. TERESA (obstructive sleep apnea)  Assessment & Plan:  Continue wearing cpap nightly  Continue working on weight loss      5. Encounter for screening colonoscopy  -     Ambulatory Referral to Gastroenterology; Future    6. Polyarthralgia  Assessment & Plan: Will check x-ray of her left shoulder  Will check for inflammatory arthritis  Continue NSAID    Orders:  -     CBC and differential; Future  -     Comprehensive metabolic panel; Future  -     Sedimentation rate, automated; Future  -     C-reactive protein; Future  -     LUIS Screen w/ Reflex to Titer/Pattern; Future  -     Chronic Hepatitis Panel; Future  -     Vitamin D 25 hydroxy; Future  -     Lyme Disease Serology w/Reflex; Future  -     RF Screen w/ Reflex to Titer; Future    7. Chronic left shoulder pain  -     XR shoulder 2+ vw left;  Future; Expected date: 08/08/2023    8. Hematoma of scalp, subsequent encounter  Assessment & Plan:  Scalp hematoma after she sustained a fall a few weeks ago  Hematoma has reduced in size  Continue supportive care           Subjective      47-year-old female with a past medical history of type 2 diabetes and sleep apnea presents today for follow-up. She reports that she has not adhere to a diabetic diet for the past few months. She is taking her metformin regularly. Her main concern today is that she has been experiencing chronic muscle aches and joint pain. She reports joint pain in multiple areas of her body but particularly her left shoulder. She denies any recent injury. She reports that this is been ongoing for a few months. She is currently managing pain with gabapentin and Motrin. She reports that she does have a family history of nonspecific arthritic condition. She is requesting to have placard disability form filled out today for parking    Review of Systems   Constitutional: Negative for chills, diaphoresis, fatigue and fever. HENT: Negative for congestion and rhinorrhea. Eyes: Negative for visual disturbance. Respiratory: Negative for cough, shortness of breath and wheezing. Cardiovascular: Negative for chest pain, palpitations and leg swelling. Gastrointestinal: Negative for abdominal pain, constipation, diarrhea, nausea and vomiting. Genitourinary: Negative for dysuria, hematuria and urgency. Musculoskeletal: Positive for arthralgias and myalgias. Negative for back pain and gait problem. Skin: Negative for rash. Neurological: Negative for seizures, syncope, facial asymmetry, weakness and numbness. Hematological: Negative for adenopathy. Current Outpatient Medications on File Prior to Visit   Medication Sig   • fluticasone-salmeterol (Advair HFA) 230-21 MCG/ACT inhaler Inhale 2 puffs 2 (two) times a day Rinse mouth after use.    • metFORMIN (GLUCOPHAGE) 1000 MG tablet Take 1 tablet (1,000 mg total) by mouth 2 (two) times a day with meals   • montelukast (SINGULAIR) 10 mg tablet Take 1 tablet (10 mg total) by mouth daily at bedtime   • pantoprazole (PROTONIX) 40 mg tablet Take 1 tablet (40 mg total) by mouth daily   • albuterol (2.5 mg/3 mL) 0.083 % nebulizer solution Take 3 mL (2.5 mg total) by nebulization every 6 (six) hours as needed for wheezing or shortness of breath   • albuterol (PROVENTIL HFA,VENTOLIN HFA) 90 mcg/act inhaler Inhale 1 puff every 6 (six) hours as needed for wheezing   • azelastine (ASTELIN) 0.1 % nasal spray 1 spray into each nostril 2 (two) times a day Use in each nostril as directed   • benzonatate (TESSALON) 200 MG capsule Take 1 capsule (200 mg total) by mouth 3 (three) times a day as needed for cough   • buPROPion (WELLBUTRIN SR) 200 MG 12 hr tablet TAKE 1 TABLET (200 MG TOTAL) BY MOUTH 2 TIMES A DAY. • Cholecalciferol (Vitamin D) 50 MCG (2000 UT) tablet Take 1 tablet (2,000 Units total) by mouth daily   • clonazePAM (KlonoPIN) 0.5 mg tablet Take 0.5 mg by mouth 2 (two) times a day   • clonazePAM (KlonoPIN) 1 mg tablet Take 1 tablet (1 mg total) by mouth 2 (two) times a day   • escitalopram (LEXAPRO) 20 mg tablet TAKE 1 TABLET BY MOUTH EVERY DAY IN THE MORNING   • fexofenadine (ALLEGRA) 60 MG tablet Take 1 tablet (60 mg total) by mouth daily   • fluticasone (FLONASE) 50 mcg/act nasal spray 2 sprays into each nostril daily   • gabapentin (NEURONTIN) 600 MG tablet Take 1 tablet (600 mg total) by mouth 3 (three) times a day   • lidocaine (Lidoderm) 5 % Apply 1 patch topically over 12 hours daily Remove & Discard patch within 12 hours or as directed by MD   • Melatonin 5 MG TABS Take 1 tablet (5 mg total) by mouth daily at bedtime   • methocarbamol (ROBAXIN) 750 mg tablet Take 1 tablet (750 mg total) by mouth every 8 (eight) hours   • Misc.  Devices (PULSE OXIMETER) MISC by Does not apply route daily   • rosuvastatin (CRESTOR) 10 MG tablet Take 1 tablet (10 mg total) by mouth daily   • traZODone (DESYREL) 100 mg tablet Take 100 mg by mouth in the morning       Objective     /80 (BP Location: Right arm, Patient Position: Sitting, Cuff Size: Large)   Pulse 99   Temp 98.6 °F (37 °C) (Temporal)   Resp 16   Ht 5' 5" (1.651 m)   Wt 113 kg (249 lb)   LMP 09/01/2019 (Approximate)   SpO2 99%   BMI 41.44 kg/m²     Physical Exam  Constitutional:       General: She is not in acute distress. Appearance: Normal appearance. She is well-developed. She is obese. She is not ill-appearing, toxic-appearing or diaphoretic. HENT:      Head: Normocephalic. No raccoon eyes, Head's sign or laceration. Comments: Small mildly tender lump on her scalp  No surrounding erythema   No skin breakdown     Right Ear: External ear normal.      Left Ear: External ear normal.      Nose: Nose normal.   Eyes:      General: No scleral icterus. Right eye: No discharge. Left eye: No discharge. Extraocular Movements: Extraocular movements intact. Pupils: Pupils are equal, round, and reactive to light. Neck:      Thyroid: No thyromegaly. Vascular: No JVD. Trachea: No tracheal deviation. Cardiovascular:      Rate and Rhythm: Normal rate and regular rhythm. Heart sounds: Normal heart sounds. No murmur heard. No friction rub. No gallop. Pulmonary:      Effort: Pulmonary effort is normal. No respiratory distress. Breath sounds: Normal breath sounds. No stridor. No wheezing or rhonchi. Abdominal:      General: Bowel sounds are normal. There is no distension. Palpations: Abdomen is soft. There is no mass. Tenderness: There is no abdominal tenderness. There is no guarding or rebound. Hernia: No hernia is present. Musculoskeletal:         General: Normal range of motion. Right shoulder: No swelling, deformity, tenderness or bony tenderness. Normal range of motion. Left shoulder: Tenderness present.  No swelling, deformity or bony tenderness. Normal range of motion. Cervical back: Normal range of motion. Right lower leg: No edema. Left lower leg: No edema. Skin:     General: Skin is warm. Capillary Refill: Capillary refill takes less than 2 seconds. Neurological:      General: No focal deficit present. Mental Status: She is alert and oriented to person, place, and time. Cranial Nerves: No cranial nerve deficit. Motor: No weakness or abnormal muscle tone.       Gait: Gait normal.   Psychiatric:         Behavior: Behavior normal.       Claritza Clark MD

## 2023-08-15 ENCOUNTER — LAB (OUTPATIENT)
Dept: LAB | Facility: HOSPITAL | Age: 48
End: 2023-08-15
Payer: COMMERCIAL

## 2023-08-15 ENCOUNTER — HOSPITAL ENCOUNTER (OUTPATIENT)
Dept: RADIOLOGY | Facility: HOSPITAL | Age: 48
Discharge: HOME | End: 2023-08-15
Payer: COMMERCIAL

## 2023-08-15 DIAGNOSIS — G89.29 CHRONIC LEFT SHOULDER PAIN: ICD-10-CM

## 2023-08-15 DIAGNOSIS — M25.50 POLYARTHRALGIA: ICD-10-CM

## 2023-08-15 DIAGNOSIS — M25.512 CHRONIC LEFT SHOULDER PAIN: ICD-10-CM

## 2023-08-15 LAB
25(OH)D3 SERPL-MCNC: 31.8 NG/ML (ref 30–100)
ALBUMIN SERPL BCP-MCNC: 3.9 G/DL (ref 3.5–5)
ALP SERPL-CCNC: 106 U/L (ref 34–104)
ALT SERPL W P-5'-P-CCNC: 38 U/L (ref 7–52)
ANA SER QL IA: NEGATIVE
ANION GAP SERPL CALCULATED.3IONS-SCNC: 9 MMOL/L
AST SERPL W P-5'-P-CCNC: 28 U/L (ref 13–39)
B BURGDOR IGG+IGM SER QL IA: NEGATIVE
BASOPHILS # BLD AUTO: 0.02 THOUSANDS/ÂΜL (ref 0–0.1)
BASOPHILS NFR BLD AUTO: 0 % (ref 0–1)
BILIRUB SERPL-MCNC: 0.51 MG/DL (ref 0.2–1)
BUN SERPL-MCNC: 20 MG/DL (ref 5–25)
CALCIUM SERPL-MCNC: 9.3 MG/DL (ref 8.4–10.2)
CHLORIDE SERPL-SCNC: 102 MMOL/L (ref 96–108)
CO2 SERPL-SCNC: 28 MMOL/L (ref 21–32)
CREAT SERPL-MCNC: 0.71 MG/DL (ref 0.6–1.3)
CRP SERPL QL: 10.3 MG/L
EOSINOPHIL # BLD AUTO: 0.1 THOUSAND/ÂΜL (ref 0–0.61)
EOSINOPHIL NFR BLD AUTO: 2 % (ref 0–6)
ERYTHROCYTE [DISTWIDTH] IN BLOOD BY AUTOMATED COUNT: 12 % (ref 11.6–15.1)
ERYTHROCYTE [SEDIMENTATION RATE] IN BLOOD: 65 MM/HOUR (ref 0–29)
GFR SERPL CREATININE-BSD FRML MDRD: 99 ML/MIN/1.73SQ M
GLUCOSE P FAST SERPL-MCNC: 160 MG/DL (ref 65–99)
HBV CORE AB SER QL: ABNORMAL
HBV CORE IGM SER QL: REACTIVE
HBV SURFACE AG SER QL: ABNORMAL
HCT VFR BLD AUTO: 42.6 % (ref 34.8–46.1)
HCV AB SER QL: ABNORMAL
HGB BLD-MCNC: 13.6 G/DL (ref 11.5–15.4)
IMM GRANULOCYTES # BLD AUTO: 0.02 THOUSAND/UL (ref 0–0.2)
IMM GRANULOCYTES NFR BLD AUTO: 0 % (ref 0–2)
LYMPHOCYTES # BLD AUTO: 2.23 THOUSANDS/ÂΜL (ref 0.6–4.47)
LYMPHOCYTES NFR BLD AUTO: 35 % (ref 14–44)
MCH RBC QN AUTO: 30.3 PG (ref 26.8–34.3)
MCHC RBC AUTO-ENTMCNC: 31.9 G/DL (ref 31.4–37.4)
MCV RBC AUTO: 95 FL (ref 82–98)
MONOCYTES # BLD AUTO: 0.39 THOUSAND/ÂΜL (ref 0.17–1.22)
MONOCYTES NFR BLD AUTO: 6 % (ref 4–12)
NEUTROPHILS # BLD AUTO: 3.64 THOUSANDS/ÂΜL (ref 1.85–7.62)
NEUTS SEG NFR BLD AUTO: 57 % (ref 43–75)
NRBC BLD AUTO-RTO: 0 /100 WBCS
PLATELET # BLD AUTO: 168 THOUSANDS/UL (ref 149–390)
PMV BLD AUTO: 11.2 FL (ref 8.9–12.7)
POTASSIUM SERPL-SCNC: 4.6 MMOL/L (ref 3.5–5.3)
PROT SERPL-MCNC: 7 G/DL (ref 6.4–8.4)
RBC # BLD AUTO: 4.49 MILLION/UL (ref 3.81–5.12)
RHEUMATOID FACT SER QL LA: NEGATIVE
SODIUM SERPL-SCNC: 139 MMOL/L (ref 135–147)
WBC # BLD AUTO: 6.4 THOUSAND/UL (ref 4.31–10.16)

## 2023-08-15 PROCEDURE — 86430 RHEUMATOID FACTOR TEST QUAL: CPT

## 2023-08-15 PROCEDURE — 36415 COLL VENOUS BLD VENIPUNCTURE: CPT

## 2023-08-15 PROCEDURE — 86618 LYME DISEASE ANTIBODY: CPT

## 2023-08-15 PROCEDURE — 87340 HEPATITIS B SURFACE AG IA: CPT

## 2023-08-15 PROCEDURE — 86705 HEP B CORE ANTIBODY IGM: CPT

## 2023-08-15 PROCEDURE — 86038 ANTINUCLEAR ANTIBODIES: CPT

## 2023-08-15 PROCEDURE — 85652 RBC SED RATE AUTOMATED: CPT

## 2023-08-15 PROCEDURE — 86704 HEP B CORE ANTIBODY TOTAL: CPT

## 2023-08-15 PROCEDURE — 82306 VITAMIN D 25 HYDROXY: CPT

## 2023-08-15 PROCEDURE — 85025 COMPLETE CBC W/AUTO DIFF WBC: CPT

## 2023-08-15 PROCEDURE — 80053 COMPREHEN METABOLIC PANEL: CPT

## 2023-08-15 PROCEDURE — 73030 X-RAY EXAM OF SHOULDER: CPT

## 2023-08-15 PROCEDURE — 86140 C-REACTIVE PROTEIN: CPT

## 2023-08-15 PROCEDURE — 86803 HEPATITIS C AB TEST: CPT

## 2023-08-18 ENCOUNTER — APPOINTMENT (OUTPATIENT)
Dept: LAB | Facility: CLINIC | Age: 48
End: 2023-08-18
Payer: COMMERCIAL

## 2023-08-18 ENCOUNTER — OFFICE VISIT (OUTPATIENT)
Dept: FAMILY MEDICINE CLINIC | Facility: CLINIC | Age: 48
End: 2023-08-18

## 2023-08-18 VITALS
BODY MASS INDEX: 40.72 KG/M2 | WEIGHT: 244.4 LBS | HEART RATE: 64 BPM | OXYGEN SATURATION: 99 % | HEIGHT: 65 IN | SYSTOLIC BLOOD PRESSURE: 128 MMHG | DIASTOLIC BLOOD PRESSURE: 88 MMHG | RESPIRATION RATE: 18 BRPM | TEMPERATURE: 97.8 F

## 2023-08-18 DIAGNOSIS — B16.9 ACUTE VIRAL HEPATITIS B WITHOUT COMA AND WITHOUT DELTA AGENT: ICD-10-CM

## 2023-08-18 DIAGNOSIS — Z83.1 FAMILY HISTORY OF STDS: ICD-10-CM

## 2023-08-18 DIAGNOSIS — R89.9 ABNORMAL LABORATORY TEST RESULT: Primary | ICD-10-CM

## 2023-08-18 LAB
HIV 1+2 AB+HIV1 P24 AG SERPL QL IA: NORMAL
HIV 2 AB SERPL QL IA: NORMAL
HIV1 AB SERPL QL IA: NORMAL
HIV1 P24 AG SERPL QL IA: NORMAL
TREPONEMA PALLIDUM IGG+IGM AB [PRESENCE] IN SERUM OR PLASMA BY IMMUNOASSAY: NORMAL

## 2023-08-18 PROCEDURE — 87491 CHLMYD TRACH DNA AMP PROBE: CPT

## 2023-08-18 PROCEDURE — 86780 TREPONEMA PALLIDUM: CPT

## 2023-08-18 PROCEDURE — 99212 OFFICE O/P EST SF 10 MIN: CPT | Performed by: FAMILY MEDICINE

## 2023-08-18 PROCEDURE — 36415 COLL VENOUS BLD VENIPUNCTURE: CPT

## 2023-08-18 PROCEDURE — 87389 HIV-1 AG W/HIV-1&-2 AB AG IA: CPT

## 2023-08-18 PROCEDURE — 87591 N.GONORRHOEAE DNA AMP PROB: CPT

## 2023-08-18 PROCEDURE — 3079F DIAST BP 80-89 MM HG: CPT | Performed by: FAMILY MEDICINE

## 2023-08-18 PROCEDURE — 3074F SYST BP LT 130 MM HG: CPT | Performed by: FAMILY MEDICINE

## 2023-08-18 NOTE — PROGRESS NOTES
Name: Mino Henderson      : 1975      MRN: [id number]  Encounter Provider: Dariana Jose MD  Encounter Date: 2023   Encounter department: [address] [facility],[address]     1. Abnormal laboratory test result  Assessment & Plan:  Patient presents for laboratory test results. She was seen 1 week ago for concern of polyarthritis and left shoulder pain. Routine labs including CBC, CMP, sed rate, and CRP, chronic hepatitis panel, and vitamin D level, Lyme disease, RF was ordered. Patient labs were significant for reactive hep B core IgM concerning for acute hepatitis B infection versus prolonged hep B core reactivity. Patient sed rate and CRP was also elevated concerning for systemic inflammatory versus ongoing viral infection. Vitals in office within normal limits, patient hemodynamically stable, denies URI and UTI symptoms. PE unremarkable. No concern for systemic infection at this point. Discussed disease course and management with patient. Patient informed that no pharmacologic intervention warranted at this moment most patient with hepatitis B infection cleared the virus. Patient endorsed understanding. Plan  -will continue on testing for STDs including HIV, RPR, GC.        2. Acute viral hepatitis B without coma and without delta agent    3. history of STDs  -     : HIV 1/2 AB/AG w Reflex [facility] for 2 yr old and above; Future  -     RPR-Syphilis Screening (Total Syphilis IGG/IGM); Future  -     Chlamydia/GC amplified DNA by PCR; Future         Subjective     Patient is a 46years old female with a past medical history of type 2 diabetes, GERD, asthma, chronic low back pain, polyarthritis, and obesity who presents to the office for follow-up visit to review her lab results. Patient was seen in office 1 week ago for polyarthritis and was sent for blood work.   Patient laboratory results were significant for elevated CRP, elevated sed rate and reactive hep B core IgM antibiotics. She has no acute complaints today apart from left shoulder discomfort. Denies headache, visual changes, chest pain, shortness of breath, palpitations, abdominal pain, dysuria, vaginal pain or discharge, and/V/D. HPI  Review of Systems   Constitutional: Negative for chills and fever. HENT: Negative for ear pain and sore throat. Eyes: Negative for pain and visual disturbance. Respiratory: Negative for cough and shortness of breath. Cardiovascular: Negative for chest pain and palpitations. Gastrointestinal: Negative for abdominal pain and vomiting. Genitourinary: Negative for dysuria and hematuria. Musculoskeletal: Negative for arthralgias and back pain. Skin: Negative for color change and rash. Neurological: Negative for seizures and syncope. All other systems reviewed and are negative. Current Outpatient Medications on File Prior to Visit   Medication Sig   • albuterol (2.5 mg/3 mL) 0.083 % nebulizer solution Take 3 mL (2.5 mg total) by nebulization every 6 (six) hours as needed for wheezing or shortness of breath   • albuterol (PROVENTIL HFA,VENTOLIN HFA) 90 mcg/act inhaler Inhale 1 puff every 6 (six) hours as needed for wheezing   • azelastine (ASTELIN) 0.1 % nasal spray 1 spray into each nostril 2 (two) times a day Use in each nostril as directed   • benzonatate (TESSALON) 200 MG capsule Take 1 capsule (200 mg total) by mouth 3 (three) times a day as needed for cough   • buPROPion (WELLBUTRIN SR) 200 MG 12 hr tablet TAKE 1 TABLET (200 MG TOTAL) BY MOUTH 2 TIMES A DAY.    • Cholecalciferol (Vitamin D) 50 MCG (2000 UT) tablet Take 1 tablet (2,000 Units total) by mouth daily   • clonazePAM (KlonoPIN) 0.5 mg tablet Take 0.5 mg by mouth 2 (two) times a day   • clonazePAM (KlonoPIN) 1 mg tablet Take 1 tablet (1 mg total) by mouth 2 (two) times a day   • escitalopram (LEXAPRO) 20 mg tablet TAKE 1 TABLET BY MOUTH EVERY DAY IN THE MORNING   • fexofenadine (ALLEGRA) 60 MG tablet Take 1 tablet (60 mg total) by mouth daily   • fluticasone (FLONASE) 50 mcg/act nasal spray 2 sprays into each nostril daily   • fluticasone-salmeterol (Advair HFA) 230-21 MCG/ACT inhaler Inhale 2 puffs 2 (two) times a day Rinse mouth after use. • gabapentin (NEURONTIN) 600 MG tablet Take 1 tablet (600 mg total) by mouth 3 (three) times a day   • lidocaine (Lidoderm) 5 % Apply 1 patch topically over 12 hours daily Remove & Discard patch within 12 hours or as directed by MD   • Melatonin 5 MG TABS Take 1 tablet (5 mg total) by mouth daily at bedtime   • metFORMIN (GLUCOPHAGE) 1000 MG tablet Take 1 tablet (1,000 mg total) by mouth 2 (two) times a day with meals   • methocarbamol (ROBAXIN) 750 mg tablet Take 1 tablet (750 mg total) by mouth every 8 (eight) hours   • Misc. Devices (PULSE OXIMETER) MISC by Does not apply route daily   • montelukast (SINGULAIR) 10 mg tablet Take 1 tablet (10 mg total) by mouth daily at bedtime   • pantoprazole (PROTONIX) 40 mg tablet Take 1 tablet (40 mg total) by mouth daily   • rosuvastatin (CRESTOR) 10 MG tablet Take 1 tablet (10 mg total) by mouth daily   • traZODone (DESYREL) 100 mg tablet Take 100 mg by mouth in the morning       Objective     /88 (BP Location: Right arm, Patient Position: Sitting, Cuff Size: Standard)   Pulse 64   Temp 97.8 °F (36.6 °C) (Temporal)   Resp 18   Ht 5' 5" (1.651 m)   Wt 111 kg (244 lb 6.4 oz)   LMP 09/01/2019 (Approximate)   SpO2 99%   BMI 40.67 kg/m²     Physical Exam  Constitutional:       General: She is not in acute distress. Appearance: Normal appearance. She is not ill-appearing. HENT:      Head: Normocephalic and atraumatic. Right Ear: External ear normal.      Left Ear: External ear normal.      Nose: No congestion. Mouth/Throat:      Mouth: Mucous membranes are moist.   Eyes:      Extraocular Movements: Extraocular movements intact.       Pupils: Pupils are equal, round, and reactive to light. Cardiovascular:      Rate and Rhythm: Normal rate and regular rhythm. Pulses: Normal pulses. Heart sounds: Normal heart sounds. No murmur heard. No friction rub. No gallop. Pulmonary:      Effort: Pulmonary effort is normal. No respiratory distress. Breath sounds: Normal breath sounds. No stridor. No wheezing or rhonchi. Chest:      Chest wall: No tenderness. Abdominal:      General: Bowel sounds are normal. There is no distension. Palpations: Abdomen is soft. There is no mass. Tenderness: There is no abdominal tenderness. There is no right CVA tenderness, left CVA tenderness or guarding. Musculoskeletal:         General: No swelling or tenderness. Normal range of motion. Cervical back: Neck supple. No tenderness. Right lower leg: No edema. Left lower leg: No edema. Skin:     General: Skin is warm and dry. Capillary Refill: Capillary refill takes less than 2 seconds. Findings: No lesion or rash. Neurological:      General: No focal deficit present. Mental Status: She is alert and oriented to person, place, and time.    Psychiatric:         Mood and Affect: Mood normal.       Jerry Talavera MD

## 2023-08-19 LAB
C TRACH DNA SPEC QL NAA+PROBE: NEGATIVE
N GONORRHOEA DNA SPEC QL NAA+PROBE: NEGATIVE

## 2023-08-19 NOTE — ASSESSMENT & PLAN NOTE
Patient presents for laboratory test results. She was seen 1 week ago for concern of polyarthritis and left shoulder pain. Routine labs including CBC, CMP, sed rate, and CRP, chronic hepatitis panel, and vitamin D level, Lyme disease, RF was ordered. Patient labs were significant for reactive hep B core IgM concerning for acute hepatitis B infection versus prolonged hep B core reactivity. Patient sed rate and CRP was also elevated concerning for systemic inflammatory versus ongoing viral infection. Vitals in office within normal limits, patient hemodynamically stable, denies URI and UTI symptoms. PE unremarkable. No concern for systemic infection at this point. Discussed disease course and management with patient. Patient informed that no pharmacologic intervention warranted at this moment most patient with hepatitis B infection cleared the virus. Patient endorsed understanding.     Plan  -will continue on testing for STDs including HIV, RPR, GC.  -Follow-up with PCP as scheduled

## 2023-08-25 DIAGNOSIS — J45.40 MODERATE PERSISTENT ASTHMA WITHOUT COMPLICATION: ICD-10-CM

## 2023-08-25 RX ORDER — ALBUTEROL SULFATE 90 UG/1
AEROSOL, METERED RESPIRATORY (INHALATION)
Qty: 18 G | Refills: 1 | Status: SHIPPED | OUTPATIENT
Start: 2023-08-25

## 2023-09-11 DIAGNOSIS — K21.9 GASTROESOPHAGEAL REFLUX DISEASE WITHOUT ESOPHAGITIS: ICD-10-CM

## 2023-09-13 RX ORDER — PANTOPRAZOLE SODIUM 40 MG/1
40 TABLET, DELAYED RELEASE ORAL DAILY
Qty: 30 TABLET | Refills: 0 | Status: SHIPPED | OUTPATIENT
Start: 2023-09-13

## 2023-10-15 DIAGNOSIS — K21.9 GASTROESOPHAGEAL REFLUX DISEASE WITHOUT ESOPHAGITIS: ICD-10-CM

## 2023-10-16 RX ORDER — PANTOPRAZOLE SODIUM 40 MG/1
40 TABLET, DELAYED RELEASE ORAL DAILY
Qty: 30 TABLET | Refills: 0 | Status: SHIPPED | OUTPATIENT
Start: 2023-10-16

## 2023-11-08 ENCOUNTER — TELEPHONE (OUTPATIENT)
Dept: FAMILY MEDICINE CLINIC | Facility: CLINIC | Age: 48
End: 2023-11-08

## 2023-11-08 NOTE — TELEPHONE ENCOUNTER
Pt left vm at 8:09 am requesting a call to reschedule 11/8/23 11:40 appt.          Called pt back and rescheduled for 11/16/23

## 2023-11-16 ENCOUNTER — OFFICE VISIT (OUTPATIENT)
Dept: FAMILY MEDICINE CLINIC | Facility: CLINIC | Age: 48
End: 2023-11-16

## 2023-11-16 VITALS
HEIGHT: 65 IN | HEART RATE: 73 BPM | SYSTOLIC BLOOD PRESSURE: 120 MMHG | OXYGEN SATURATION: 99 % | DIASTOLIC BLOOD PRESSURE: 80 MMHG | RESPIRATION RATE: 16 BRPM | TEMPERATURE: 98.6 F | WEIGHT: 248 LBS | BODY MASS INDEX: 41.32 KG/M2

## 2023-11-16 DIAGNOSIS — J45.40 MODERATE PERSISTENT ASTHMA WITHOUT COMPLICATION: ICD-10-CM

## 2023-11-16 DIAGNOSIS — M51.26 LUMBAR DISC HERNIATION: ICD-10-CM

## 2023-11-16 DIAGNOSIS — M54.42 CHRONIC BILATERAL LOW BACK PAIN WITH BILATERAL SCIATICA: ICD-10-CM

## 2023-11-16 DIAGNOSIS — G89.29 CHRONIC BILATERAL LOW BACK PAIN WITH BILATERAL SCIATICA: ICD-10-CM

## 2023-11-16 DIAGNOSIS — E66.01 MORBID OBESITY WITH BMI OF 40.0-44.9, ADULT (HCC): ICD-10-CM

## 2023-11-16 DIAGNOSIS — B16.9 ACUTE VIRAL HEPATITIS B WITHOUT COMA AND WITHOUT DELTA AGENT: ICD-10-CM

## 2023-11-16 DIAGNOSIS — Z23 ENCOUNTER FOR IMMUNIZATION: ICD-10-CM

## 2023-11-16 DIAGNOSIS — Z00.00 ANNUAL PHYSICAL EXAM: ICD-10-CM

## 2023-11-16 DIAGNOSIS — B37.0 THRUSH: ICD-10-CM

## 2023-11-16 DIAGNOSIS — E11.9 TYPE 2 DIABETES MELLITUS WITHOUT COMPLICATION, WITHOUT LONG-TERM CURRENT USE OF INSULIN (HCC): Primary | ICD-10-CM

## 2023-11-16 DIAGNOSIS — F33.41 RECURRENT MAJOR DEPRESSIVE DISORDER, IN PARTIAL REMISSION (HCC): ICD-10-CM

## 2023-11-16 DIAGNOSIS — Z12.11 SCREENING FOR COLORECTAL CANCER: ICD-10-CM

## 2023-11-16 DIAGNOSIS — G47.33 OSA (OBSTRUCTIVE SLEEP APNEA): ICD-10-CM

## 2023-11-16 DIAGNOSIS — B35.4 TINEA CORPORIS: ICD-10-CM

## 2023-11-16 DIAGNOSIS — Z12.12 SCREENING FOR COLORECTAL CANCER: ICD-10-CM

## 2023-11-16 DIAGNOSIS — M54.41 CHRONIC BILATERAL LOW BACK PAIN WITH BILATERAL SCIATICA: ICD-10-CM

## 2023-11-16 LAB — SL AMB POCT HEMOGLOBIN AIC: 7.4 (ref ?–6.5)

## 2023-11-16 PROCEDURE — 3079F DIAST BP 80-89 MM HG: CPT | Performed by: FAMILY MEDICINE

## 2023-11-16 PROCEDURE — 90686 IIV4 VACC NO PRSV 0.5 ML IM: CPT | Performed by: FAMILY MEDICINE

## 2023-11-16 PROCEDURE — 83036 HEMOGLOBIN GLYCOSYLATED A1C: CPT | Performed by: FAMILY MEDICINE

## 2023-11-16 PROCEDURE — 99214 OFFICE O/P EST MOD 30 MIN: CPT | Performed by: FAMILY MEDICINE

## 2023-11-16 PROCEDURE — 90471 IMMUNIZATION ADMIN: CPT | Performed by: FAMILY MEDICINE

## 2023-11-16 PROCEDURE — 3074F SYST BP LT 130 MM HG: CPT | Performed by: FAMILY MEDICINE

## 2023-11-16 PROCEDURE — 99396 PREV VISIT EST AGE 40-64: CPT | Performed by: FAMILY MEDICINE

## 2023-11-16 RX ORDER — IBUPROFEN 400 MG/1
400 TABLET ORAL EVERY 8 HOURS PRN
Qty: 30 TABLET | Refills: 1 | Status: SHIPPED | OUTPATIENT
Start: 2023-11-16

## 2023-11-16 RX ORDER — PRENATAL VIT 91/IRON/FOLIC/DHA 28-975-200
COMBINATION PACKAGE (EA) ORAL DAILY
Qty: 42 G | Refills: 1 | Status: SHIPPED | OUTPATIENT
Start: 2023-11-16 | End: 2023-11-23

## 2023-11-16 NOTE — PROGRESS NOTES
ADULT ANNUAL PHYSICAL  [address] [place] [address] RAIMUNDO    NAME: Channing Scheuermann Colon Mercado  AGE: 46 y.o. SEX: female  : 1975     DATE: 2023     Assessment and Plan:     Problem List Items Addressed This Visit       Moderate asthma     Stable  Not in acute exacerbation  Continue albuterol and Advair and montelukast  Highly recommend she rinse her mouth after using Advair  Up-to-date on her pneumococcal and influenza vaccination         Chronic bilateral low back pain with bilateral sciatica     Stable  Continue gabapentin and NSAID as needed  Advised patient to use NSAID cautiously while she is taking SSRI due to risk of GI bleed. Patient is understanding.   Continue home exercise         Relevant Medications    ibuprofen (MOTRIN) 400 mg tablet    Lumbar disc herniation    Relevant Medications    ibuprofen (MOTRIN) 400 mg tablet    Recurrent major depressive disorder, in partial remission (HCC)     -PHQ 7  -Stable  -No SI/HI  -Continue trazadone and lexapro  -Follows with psychiatry outpatient           TERESA (obstructive sleep apnea)     Stable  Continue to work on weight loss  Continue with CPAP machine at nighttime         Type 2 diabetes mellitus without complication, without long-term current use of insulin ([address]) - Primary       Lab Results   Component Value Date    HGBA1C 7.4 (A) 2023     Lab Results   Component Value Date    HGBA1C 7.4 (A) 2023   HBA1C has improved from last visit  Extensive dietary counseling provided  Continue metformin at current dosage  Continue low carbohydrate meal plan  Continue statin  Trial of GLP-1 agonist to help facilitate weight loss  Recommend annual podiatry and ophthalmology evaluation         Relevant Medications    semaglutide, 0.25 or 0.5 mg/dose, (Ozempic, 0.25 or 0.5 MG/DOSE,) 2 mg/3 mL injection pen    Other Relevant Orders    POCT hemoglobin A1c (Completed)    Thrush    Relevant Medications nystatin (MYCOSTATIN) 500,000 units/5 mL suspension    Tinea corporis    Relevant Medications    terbinafine (LamISIL) 1 % cream    Acute viral hepatitis B without coma and without delta agent     Hepatitis B IgM positive which suggests acute infection  Hepatitis B surface antigen negative  LFTs are normal  We discussed modes of transmission of the disease  Safe sex counseling provided to patient  We will monitor          Other Visit Diagnoses       Annual physical exam        Screening for colorectal cancer        Relevant Orders    Ambulatory referral to Gastroenterology    Encounter for immunization        Relevant Orders    influenza vaccine, quadrivalent, 0.5 mL, preservative-free, for adult and pediatric patients 6 mos+ (AFLURIA, FLUARIX, FLULAVAL, FLUZONE) (Completed)    Morbid obesity with BMI of 40.0-44.9, adult ([address])        Relevant Medications    semaglutide, 0.25 or 0.5 mg/dose, (Ozempic, 0.25 or 0.5 MG/DOSE,) 2 mg/3 mL injection pen            Immunizations and preventive care screenings were discussed with patient today. Appropriate education was printed on patient's after visit summary. PAP smear up to date from 2 years ago by Dr. Everett Fields:  Alcohol/drug use: discussed moderation in alcohol intake, the recommendations for healthy alcohol use, and avoidance of illicit drug use. Sexual health: discussed sexually transmitted diseases, partner selection, use of condoms, avoidance of unintended pregnancy, and contraceptive alternatives. Exercise: the importance of regular exercise/physical activity was discussed. Recommend exercise 3-5 times per week for at least 30 minutes. Return in 3 months (on 2/16/2024) for Next scheduled follow up type II DM.      Chief Complaint:     Chief Complaint   Patient presents with    Physical Exam     Head pain         History of Present Illness:     Adult Annual Physical   Patient here for a comprehensive physical exam. The patient reports problems -   .    53-year-old female with a history of type 2 diabetes, obesity, TERESA, chronic low back pain, and depression who presents today for follow-up. She has multiple concerns today. She reports chronic low back pain. She has not done any physical therapy but has been doing home exercise. She reports that Motrin helps her. She is requesting refill. She has a rash on her right thigh area that is often itchy. She denies using any new cream.  She reports that the rash has been there for quite some time. She also reports that she notices the rash in various areas of her body. Patient also reports that she has a lesion on her tongue. She reports that it sometimes burns and irritates. She has a history of asthma. She uses inhaled steroid. She admits that she does not always rinse her mouth. Diet and Physical Activity  Diet/Nutrition: well balanced diet. Exercise: walking. Depression Screening  PHQ-2/9 Depression Screening    Little interest or pleasure in doing things: 1 - several days  Feeling down, depressed, or hopeless: 1 - several days  Trouble falling or staying asleep, or sleeping too much: 1 - several days  Feeling tired or having little energy: 1 - several days  Poor appetite or overeatin - several days  Feeling bad about yourself - or that you are a failure or have let yourself or your family down: 1 - several days  Trouble concentrating on things, such as reading the newspaper or watching television: 1 - several days  Moving or speaking so slowly that other people could have noticed. Or the opposite - being so fidgety or restless that you have been moving around a lot more than usual: 0 - not at all  Thoughts that you would be better off dead, or of hurting yourself in some way: 0 - not at all  PHQ-9 Score: 7   PHQ-9 Interpretation: Mild depression          General Health  Sleep: sleeps well. Hearing: normal - bilateral.  Vision: wears glasses.    Dental: brushes teeth once daily. /GYN Health  Follows with gynecology? yes   Patient is: postmenopausal  Last menstrual period: 6 years ago  Contraceptive method:  none . Advanced Care Planning  Do you have an advanced directive? no  Do you have a durable medical power of ? no     Review of Systems:     Review of Systems   Constitutional:  Negative for appetite change, chills, diaphoresis, fatigue, fever and unexpected weight change. HENT:  Negative for congestion. Eyes:  Negative for visual disturbance. Respiratory:  Negative for cough, shortness of breath and wheezing. Cardiovascular:  Negative for chest pain, palpitations and leg swelling. Gastrointestinal:  Negative for abdominal pain, diarrhea, nausea and vomiting. Endocrine: Negative for polydipsia, polyphagia and polyuria. Genitourinary:  Negative for dysuria, hematuria and urgency. Musculoskeletal:  Positive for back pain and myalgias. Negative for arthralgias and neck stiffness. Skin:  Positive for rash. Negative for wound. Neurological:  Negative for dizziness, syncope, weakness, numbness and headaches. Hematological:  Negative for adenopathy. Psychiatric/Behavioral:  Negative for agitation. Past Medical History:     Past Medical History:   Diagnosis Date    Anemia     Anxiety     Arthritis     hands, knee    Asthma     uses inhalers for the same.  No recnt flairups    Back pain     Carpal tunnel syndrome     Bilateral    Constipation     Depression     Diabetes mellitus (HCC)     Environmental allergies     GERD (gastroesophageal reflux disease)     H/O cardiac murmur     Insomnia     Kidney stone     Migraine     Psychiatric disorder     anxiety    Pyelonephritis     Sleep apnea, obstructive     Wears glasses       Past Surgical History:     Past Surgical History:   Procedure Laterality Date    BACK SURGERY      States she isn't sure what she had done-possibly something to do with a lump or muscle    CHOLECYSTECTOMY CYSTOSCOPY      onset: 14, resolved: 14    ESOPHAGOGASTRODUODENOSCOPY      ID LIG&DIV LONG SAPH VEIN SAPHFEM JUNCT/INTERRUPJ Left 2016    Procedure: LIGATION/STRIPPING VEIN, LIGATION OF ANTERIOR TRIBUTARY GREATER SAPHENOUS VEIN BRANCH;  Surgeon: Yariel Acosta DO;  Location: [facility];  Service: Vascular    ID STAB PHLEBT VARICOSE VEINS 1 XTR 10-20 STAB INCS Left 2016    Procedure: MULTIPLE STAB PHLEBECTOMIES;  Surgeon: Yariel Acosta DO;  Location: [facility];  Service: Vascular    ID STAB PHLEBT VARICOSE VEINS 1 XTR > 20 INCS Right 2021    Procedure: MULTIPLE STAB PHLEBECTOMIES;  Surgeon: Yariel Acosta DO;  [facility];  Service: Vascular    TUBAL LIGATION      VARICOSE VEIN SURGERY Left     Left leg    VEIN LIGATION Right 2017    Procedure: LIGATION GREATER SAPHENOUS VEIN TRIBUTARY WITH STAB PHLEBECTOMIES ;  Surgeon: Yariel Acosta DO;  Location: [facility];  Service:       Social History:     Social History     Socioeconomic History    Marital status: /Civil Union     Spouse name: None    Number of children: None    Years of education: None    Highest education level: None   Occupational History    None   Tobacco Use    Smoking status: Former     Packs/day: 2.00     Years: 10.00     Total pack years: 20.00     Types: Cigarettes     Start date: 1995     Quit date: 2019     Years since quittin.2    Smokeless tobacco: Never   Vaping Use    Vaping Use: Never used   Substance and Sexual Activity    Alcohol use: Yes     Comment: occasionally    Drug use: Never    Sexual activity: Not Currently     Partners: Male     Birth control/protection: None   Other Topics Concern    None   Social History Narrative    None     Social Determinants of Health     Financial Resource Strain: Low Risk  (2023)    Overall Financial Resource Strain (CARDIA)     Difficulty of Paying Living Expenses: Not hard at all   Food Insecurity: No Food Insecurity (8/18/2023)    Hunger Vital Sign     Worried About Running Out of Food in the Last Year: Never true     [address] in the Last Year: Never true   Transportation Needs: No Transportation Needs (8/18/2023)    PRAPARE - Transportation     Lack of Transportation (Medical): No     Lack of Transportation (Non-Medical): No   Physical Activity: Not on file   Stress: Not on file   Social Connections: Not on file   Intimate Partner Violence: Not on file   Housing Stability: Not on file      Family History:     Family History   Problem Relation Age of Onset    Asthma Mother     Coronary artery disease Mother     Depression Mother     Diabetes Mother     Hypertension Mother     Varicose Veins Mother     Asthma Father     Hypertension Father     Lung cancer Father     Diabetes Father     Breast cancer Sister 39    No Known Problems Daughter     No Known Problems Maternal Grandmother     Colon cancer Maternal Grandfather     No Known Problems Paternal Grandmother     No Known Problems Paternal Grandfather     Breast cancer Maternal Aunt 48    Breast cancer Maternal Aunt 60    Ovarian cancer Maternal Aunt       Current Medications:     Current Outpatient Medications   Medication Sig Dispense Refill    ibuprofen (MOTRIN) 400 mg tablet Take 1 tablet (400 mg total) by mouth every 8 (eight) hours as needed for mild pain 30 tablet 1    metFORMIN (GLUCOPHAGE) 1000 MG tablet Take 1 tablet (1,000 mg total) by mouth 2 (two) times a day with meals 180 tablet 1    nystatin (MYCOSTATIN) 500,000 units/5 mL suspension Apply 5 mL (500,000 Units total) to the mouth or throat 4 (four) times a day for 14 days 4 to 6 mL orally 4 times daily; place one-half of the dose in each side of mouth and retain in mouth as long as possible before swallowing.  473 mL 0    semaglutide, 0.25 or 0.5 mg/dose, (Ozempic, 0.25 or 0.5 MG/DOSE,) 2 mg/3 mL injection pen Inject 0.25 mg under the skin every 7 days for 28 days, THEN inject 0.5 mg under the skin every 7 days 9 mL 0    terbinafine (LamISIL) 1 % cream Apply topically in the morning for 7 days 42 g 1    albuterol (2.5 mg/3 mL) 0.083 % nebulizer solution Take 3 mL (2.5 mg total) by nebulization every 6 (six) hours as needed for wheezing or shortness of breath 30 mL 0    albuterol (PROVENTIL HFA,VENTOLIN HFA) 90 mcg/act inhaler INHALE 1 PUFF BY MOUTH EVERY 6 HOURS AS NEEDED FOR WHEEZE 18 g 1    azelastine (ASTELIN) 0.1 % nasal spray 1 spray into each nostril 2 (two) times a day Use in each nostril as directed 1 Bottle 1    benzonatate (TESSALON) 200 MG capsule Take 1 capsule (200 mg total) by mouth 3 (three) times a day as needed for cough 20 capsule 3    buPROPion (WELLBUTRIN SR) 200 MG 12 hr tablet TAKE 1 TABLET (200 MG TOTAL) BY MOUTH 2 TIMES A DAY. 60 tablet 0    Cholecalciferol (Vitamin D) 50 MCG (2000 UT) tablet Take 1 tablet (2,000 Units total) by mouth daily 90 tablet 1    clonazePAM (KlonoPIN) 0.5 mg tablet Take 0.5 mg by mouth 2 (two) times a day      clonazePAM (KlonoPIN) 1 mg tablet Take 1 tablet (1 mg total) by mouth 2 (two) times a day 45 tablet 0    escitalopram (LEXAPRO) 20 mg tablet TAKE 1 TABLET BY MOUTH EVERY DAY IN THE MORNING 90 tablet 1    fexofenadine (ALLEGRA) 60 MG tablet Take 1 tablet (60 mg total) by mouth daily 30 tablet 2    fluticasone (FLONASE) 50 mcg/act nasal spray 2 sprays into each nostril daily 16 mL 1    fluticasone-salmeterol (Advair HFA) 230-21 MCG/ACT inhaler Inhale 2 puffs 2 (two) times a day Rinse mouth after use. 12 g 2    gabapentin (NEURONTIN) 600 MG tablet Take 1 tablet (600 mg total) by mouth 3 (three) times a day 90 tablet 1    lidocaine (Lidoderm) 5 % Apply 1 patch topically over 12 hours daily Remove & Discard patch within 12 hours or as directed by MD 21 patch 0    Melatonin 5 MG TABS Take 1 tablet (5 mg total) by mouth daily at bedtime 30 tablet 1    methocarbamol (ROBAXIN) 750 mg tablet Take 1 tablet (750 mg total) by mouth every 8 (eight) hours 90 tablet 0    Misc. Devices (PULSE OXIMETER) MISC by Does not apply route daily 1 each 0    montelukast (SINGULAIR) 10 mg tablet Take 1 tablet (10 mg total) by mouth daily at bedtime 30 tablet 2    pantoprazole (PROTONIX) 40 mg tablet TAKE 1 TABLET BY MOUTH EVERY DAY 30 tablet 0    rosuvastatin (CRESTOR) 10 MG tablet Take 1 tablet (10 mg total) by mouth daily 90 tablet 1    traZODone (DESYREL) 100 mg tablet Take 100 mg by mouth in the morning       No current facility-administered medications for this visit. Allergies:     No Known Allergies   Physical Exam:     /80 (BP Location: Right arm, Patient Position: Sitting, Cuff Size: Large)   Pulse 73   Temp 98.6 °F (37 °C) (Temporal)   Resp 16   Ht 5' 5" (1.651 m)   Wt 112 kg (248 lb)   LMP 09/01/2019 (Approximate)   SpO2 99%   BMI 41.27 kg/m²     Physical Exam  Constitutional:       General: She is not in acute distress. Appearance: Normal appearance. She is well-developed. She is obese. She is not ill-appearing, toxic-appearing or diaphoretic. HENT:      Head: Normocephalic and atraumatic. Right Ear: Tympanic membrane, ear canal and external ear normal. There is no impacted cerumen. Left Ear: Tympanic membrane, ear canal and external ear normal. There is no impacted cerumen. Nose: Nose normal.      Mouth/Throat:      Mouth: Mucous membranes are moist.      Pharynx: No oropharyngeal exudate or posterior oropharyngeal erythema. Comments: Whitish scrappable plaque along the tongue  Eyes:      General: No scleral icterus. Right eye: No discharge. Left eye: No discharge. Extraocular Movements: Extraocular movements intact. Pupils: Pupils are equal, round, and reactive to light. Cardiovascular:      Rate and Rhythm: Normal rate and regular rhythm. Heart sounds: Normal heart sounds. No murmur heard. No friction rub. No gallop. Pulmonary:      Effort: Pulmonary effort is normal. No respiratory distress. Breath sounds: Normal breath sounds. No stridor. No wheezing or rhonchi. Abdominal:      General: Bowel sounds are normal. There is no distension. Palpations: Abdomen is soft. There is no mass. Tenderness: There is no abdominal tenderness. There is no guarding. Musculoskeletal:         General: Normal range of motion. Cervical back: Normal range of motion. Lumbar back: Spasms and tenderness present. No swelling or edema. Normal range of motion. Right lower leg: No edema. Left lower leg: No edema. Skin:     General: Skin is warm. Capillary Refill: Capillary refill takes less than 2 seconds. Coloration: Skin is not jaundiced. Findings: Rash present. No lesion. Comments: Approximately 1 cm flat dry scaly lesion with surrounding mild erythema   Neurological:      General: No focal deficit present. Mental Status: She is alert and oriented to person, place, and time. Cranial Nerves: No cranial nerve deficit. Motor: No weakness.       Gait: Gait normal.   Psychiatric:         Mood and Affect: Mood normal.         Behavior: Behavior normal.          Js Mirza MD  [address] [place] [address]

## 2023-11-16 NOTE — ASSESSMENT & PLAN NOTE
Lab Results   Component Value Date    HGBA1C 7.4 (A) 11/16/2023     Lab Results   Component Value Date    HGBA1C 7.4 (A) 11/16/2023   HBA1C has improved from last visit  Extensive dietary counseling provided  Continue metformin at current dosage  Continue low carbohydrate meal plan  Continue statin  Trial of GLP-1 agonist to help facilitate weight loss  Recommend annual podiatry and ophthalmology evaluation

## 2023-11-16 NOTE — PATIENT INSTRUCTIONS
Wellness Visit for Adults   AMBULATORY CARE:   A wellness visit  is when you see your healthcare provider to get screened for health problems. Your healthcare provider will also give you advice on how to stay healthy. Write down your questions so you remember to ask them. Ask your healthcare provider how often you should have a wellness visit. What happens at a wellness visit:  Your healthcare provider will ask about your health, and your family history of health problems. This includes high blood pressure, heart disease, and cancer. He or she will ask if you have symptoms that concern you, if you smoke, and about your mood. You may also be asked about your intake of medicines, supplements, food, and alcohol. Any of the following may be done: Your weight  will be checked. Your height may also be checked so your body mass index (BMI) can be calculated. Your BMI shows if you are at a healthy weight. Your blood pressure  and heart rate will be checked. Your temperature may also be checked. Blood and urine tests  may be done. Blood tests may be done to check your cholesterol levels. Abnormal cholesterol levels increase your risk for heart disease and stroke. You may also need a blood or urine test to check for diabetes if you are at increased risk. Urine tests may be done to look for signs of an infection or kidney disease. A physical exam  includes checking your heartbeat and lungs with a stethoscope. Your healthcare provider may also check your skin to look for sun damage. Screening tests  may be recommended. A screening test is done to check for diseases that may not cause symptoms. The screening tests you may need depend on your age, gender, family history, and lifestyle habits. For example, colorectal screening may be recommended if you are 48years old or older. Screening tests you need if you are a woman:   A Pap smear  is used to screen for cervical cancer.  Pap smears are usually done every 3 to 5 years depending on your age. You may need them more often if you have had abnormal Pap smear test results in the past. Ask your healthcare provider how often you should have a Pap smear. A mammogram  is an x-ray of your breasts to screen for breast cancer. Experts recommend mammograms every 2 years starting at age 48 years. You may need a mammogram at age 52 years or younger if you have an increased risk for breast cancer. Talk to your healthcare provider about when you should start having mammograms and how often you need them. Vaccines you may need:   Get an influenza vaccine  every year. The influenza vaccine protects you from the flu. Several types of viruses cause the flu. The viruses change over time, so new vaccines are made each year. Get a tetanus-diphtheria (Td) booster vaccine  every 10 years. This vaccine protects you against tetanus and diphtheria. Tetanus is a severe infection that may cause painful muscle spasms and lockjaw. Diphtheria is a severe bacterial infection that causes a thick covering in the back of your mouth and throat. Get a human papillomavirus (HPV) vaccine  if you are female and aged 23 to 32 or male 23 to 24 and never received it. This vaccine protects you from HPV infection. HPV is the most common infection spread by sexual contact. HPV may also cause vaginal, penile, and anal cancers. Get a pneumococcal vaccine  if you are aged 72 years or older. The pneumococcal vaccine is an injection given to protect you from pneumococcal disease. Pneumococcal disease is an infection caused by pneumococcal bacteria. The infection may cause pneumonia, meningitis, or an ear infection. Get a shingles vaccine  if you are 60 or older, even if you have had shingles before. The shingles vaccine is an injection to protect you from the varicella-zoster virus. This is the same virus that causes chickenpox.  Shingles is a painful rash that develops in people who had chickenpox or have been exposed to the virus. How to eat healthy:  My Plate is a model for planning healthy meals. It shows the types and amounts of foods that should go on your plate. Fruits and vegetables make up about half of your plate, and grains and protein make up the other half. A serving of dairy is included on the side of your plate. The amount of calories and serving sizes you need depends on your age, gender, weight, and height. Examples of healthy foods are listed below:  Eat a variety of vegetables  such as dark green, red, and orange vegetables. You can also include canned vegetables low in sodium (salt) and frozen vegetables without added butter or sauces. Eat a variety of fresh fruits , canned fruit in 100% juice, frozen fruit, and dried fruit. Include whole grains. At least half of the grains you eat should be whole grains. Examples include whole-wheat bread, wheat pasta, brown rice, and whole-grain cereals such as oatmeal.    Eat a variety of protein foods such as seafood (fish and shellfish), lean meat, and poultry without skin (turkey and chicken). Examples of lean meats include pork leg, shoulder, or tenderloin, and beef round, sirloin, tenderloin, and extra lean ground beef. Other protein foods include eggs and egg substitutes, beans, peas, soy products, nuts, and seeds. Choose low-fat dairy products such as skim or 1% milk or low-fat yogurt, cheese, and cottage cheese. Limit unhealthy fats  such as butter, hard margarine, and shortening. Exercise:  Exercise at least 30 minutes per day on most days of the week. Some examples of exercise include walking, biking, dancing, and swimming. You can also fit in more physical activity by taking the stairs instead of the elevator or parking farther away from stores. Include muscle strengthening activities 2 days each week. Regular exercise provides many health benefits.  It helps you manage your weight, and decreases your risk for type 2 diabetes, heart disease, stroke, and high blood pressure. Exercise can also help improve your mood. Ask your healthcare provider about the best exercise plan for you. General health and safety guidelines:   Do not smoke. Nicotine and other chemicals in cigarettes and cigars can cause lung damage. Ask your healthcare provider for information if you currently smoke and need help to quit. E-cigarettes or smokeless tobacco still contain nicotine. Talk to your healthcare provider before you use these products. Limit alcohol. A drink of alcohol is 12 ounces of beer, 5 ounces of wine, or 1½ ounces of liquor. Lose weight, if needed. Being overweight increases your risk of certain health conditions. These include heart disease, high blood pressure, type 2 diabetes, and certain types of cancer. Protect your skin. Do not sunbathe or use tanning beds. Use sunscreen with a SPF 15 or higher. Apply sunscreen at least 15 minutes before you go outside. Reapply sunscreen every 2 hours. Wear protective clothing, hats, and sunglasses when you are outside. Drive safely. Always wear your seatbelt. Make sure everyone in your car wears a seatbelt. A seatbelt can save your life if you are in an accident. Do not use your cell phone when you are driving. This could distract you and cause an accident. Pull over if you need to make a call or send a text message. Practice safe sex. Use latex condoms if are sexually active and have more than one partner. Your healthcare provider may recommend screening tests for sexually transmitted infections (STIs). Wear helmets, lifejackets, and protective gear. Always wear a helmet when you ride a bike or motorcycle, go skiing, or play sports that could cause a head injury. Wear protective equipment when you play sports. Wear a lifejacket when you are on a boat or doing water sports.     © Copyright R[employer] 2023 Information is for End User's use only and may not be sold, redistributed or otherwise used for commercial purposes. The above information is an  only. It is not intended as medical advice for individual conditions or treatments. Talk to your doctor, nurse or pharmacist before following any medical regimen to see if it is safe and effective for you. Weight Management   AMBULATORY CARE:   Why it is important to manage your weight:  Being overweight increases your risk of health conditions such as heart disease, high blood pressure, type 2 diabetes, and certain types of cancer. It can also increase your risk for osteoarthritis, sleep apnea, and other respiratory problems. Aim for a slow, steady weight loss. Even a small amount of weight loss can lower your risk of health problems. Risks of being overweight:  Extra weight can cause many health problems, including the following:  Diabetes (high blood sugar level)    High blood pressure or high cholesterol    Heart disease    Stroke    Gallbladder or liver disease    Cancer of the colon, breast, prostate, liver, or kidney    Sleep apnea    Arthritis or gout    Screening  is done to check for health conditions before you have signs or symptoms. If you are 28to 79years old, your blood sugar level may be checked every 3 years for signs of prediabetes or diabetes. Your healthcare provider will check your blood pressure at each visit. High blood pressure can lead to a stroke or other problems. Your provider may check for signs of heart disease, cancer, or other health problems. How to lose weight safely:  A safe and healthy way to lose weight is to eat fewer calories and get regular exercise. You can lose up about 1 pound a week by decreasing the number of calories you eat by 500 calories each day. You can decrease calories by eating smaller portion sizes or by cutting out high-calorie foods. Read labels to find out how many calories are in the foods you eat.          You can also burn calories with exercise such as walking, swimming, or biking. You will be more likely to keep weight off if you make these changes part of your lifestyle. Exercise at least 30 minutes per day on most days of the week. You can also fit in more physical activity by taking the stairs instead of the elevator or parking farther away from stores. Ask your healthcare provider about the best exercise plan for you. Healthy meal plan for weight management:  A healthy meal plan includes a variety of foods, contains fewer calories, and helps you stay healthy. A healthy meal plan includes the following:     Eat whole-grain foods more often. A healthy meal plan should contain fiber. Fiber is the part of grains, fruits, and vegetables that is not broken down by your body. Whole-grain foods are healthy and provide extra fiber in your diet. Some examples of whole-grain foods are whole-wheat breads and pastas, oatmeal, brown rice, and bulgur. Eat a variety of vegetables every day. Include dark, leafy greens such as spinach, kale, fco greens, and mustard greens. Eat yellow and orange vegetables such as carrots, sweet potatoes, and winter squash. Eat a variety of fruits every day. Choose fresh or canned fruit (canned in its own juice or light syrup) instead of juice. Fruit juice has very little or no fiber. Eat low-fat dairy foods. Drink fat-free (skim) milk or 1% milk. Eat fat-free yogurt and low-fat cottage cheese. Try low-fat cheeses such as mozzarella and other reduced-fat cheeses. Choose meat and other protein foods that are low in fat. Choose beans or other legumes such as split peas or lentils. Choose fish, skinless poultry (chicken or turkey), or lean cuts of red meat (beef or pork). Before you cook meat or poultry, cut off any visible fat. Use less fat and oil. Try baking foods instead of frying them. Add less fat, such as margarine, sour cream, regular salad dressing and mayonnaise to foods. Eat fewer high-fat foods.  Some examples of high-fat foods include french fries, doughnuts, ice cream, and cakes. Eat fewer sweets. Limit foods and drinks that are high in sugar. This includes candy, cookies, regular soda, and sweetened drinks. Ways to decrease calories:   Eat smaller portions. Use a small plate with smaller servings. Do not eat second helpings. When you eat at a restaurant, ask for a box and place half of your meal in the box before you eat. Share an entrée with someone else. Replace high-calorie snacks with healthy, low-calorie snacks. Choose fresh fruit, vegetables, fat-free rice cakes, or air-popped popcorn instead of potato chips, nuts, or chocolate. Choose water or calorie-free drinks instead of soda or sweetened drinks. Do not shop for groceries when you are hungry. You may be more likely to make unhealthy food choices. Take a grocery list of healthy foods and shop after you have eaten. Eat regular meals. Do not skip meals. Skipping meals can lead to overeating later in the day. This can make it harder for you to lose weight. Eat a healthy snack in place of a meal if you do not have time to eat a regular meal. Talk with a dietitian to help you create a meal plan and schedule that is right for you. Other things to consider as you try to lose weight:   Be aware of situations that may give you the urge to overeat, such as eating while watching television. Find ways to avoid these situations. For example, read a book, go for a walk, or do crafts. Meet with a weight loss support group or friends who are also trying to lose weight. This may help you stay motivated to continue working on your weight loss goals. © Copyright [employer] 2023 Information is for End User's use only and may not be sold, redistributed or otherwise used for commercial purposes. The above information is an  only. It is not intended as medical advice for individual conditions or treatments.  Talk to your doctor, nurse or pharmacist before following any medical regimen to see if it is safe and effective for you. Cholesterol and Your Health   AMBULATORY CARE:   Cholesterol  is a waxy, fat-like substance. Your body uses cholesterol to make hormones and new cells, and to protect nerves. Cholesterol is made by your body. It also comes from certain foods you eat, such as meat and dairy products. Your healthcare provider can help you set goals for your cholesterol levels. Your provider can help you create a plan to meet your goals. Cholesterol level goals: Your cholesterol level goals depend on your risk for heart disease, your age, and your other health conditions. The following are general guidelines: Total cholesterol  includes low-density lipoprotein (LDL), high-density lipoprotein (HDL), and triglyceride levels. The total cholesterol level should be lower than 200 mg/dL and is best at about 150 mg/dL. LDL cholesterol  is called bad cholesterol  because it forms plaque in your arteries. As plaque builds up, your arteries become narrow, and less blood flows through. When plaque decreases blood flow to your heart, you may have chest pain. If plaque completely blocks an artery that brings blood to your heart, you may have a heart attack. Plaque can break off and form blood clots. Blood clots may block arteries in your brain and cause a stroke. The level should be less than 130 mg/dL and is best at about 100 mg/dL. HDL cholesterol  is called good cholesterol  because it helps remove LDL cholesterol from your arteries. It does this by attaching to LDL cholesterol and carrying it to your liver. Your liver breaks down LDL cholesterol so your body can get rid of it. High levels of HDL cholesterol can help prevent a heart attack and stroke. Low levels of HDL cholesterol can increase your risk for heart disease, heart attack, and stroke.  The level should be at least 40 mg/dL in males or at least 50 mg/dL in females. Triglycerides  are a type of fat that store energy from foods you eat. High levels of triglycerides also cause plaque buildup. This can increase your risk for a heart attack or stroke. If your triglyceride level is high, your LDL cholesterol level may also be high. The level should be less than 150 mg/dL. Any of the following can increase your risk for high cholesterol:   Smoking or drinking large amounts of alcohol    Having overweight or obesity, or not getting enough exercise    A medical condition such as hypertension (high blood pressure) or diabetes    A family history of high cholesterol    Age older than 72    What you need to know about having your cholesterol levels checked: Adults 21to 39years of age should have their cholesterol levels checked every 4 to 6 years. Adults 45 years or older should have their cholesterol checked every 1 to 2 years. You may need your cholesterol checked more often, or at a younger age, if you have risk factors for heart disease. You may also need to have your cholesterol checked more often if you have other health conditions, such as diabetes. Blood tests are used to check cholesterol levels. Blood tests measure your levels of triglycerides, LDL cholesterol, and HDL cholesterol. How healthy fats affect your cholesterol levels:  Healthy fats, also called unsaturated fats, help lower LDL cholesterol and triglyceride levels. Healthy fats include the following:  Monounsaturated fats  are found in foods such as olive oil, canola oil, avocado, nuts, and olives. Polyunsaturated fats,  such as omega 3 fats, are found in fish, such as salmon, trout, and tuna. They can also be found in plant foods such as flaxseed, walnuts, and soybeans. How unhealthy fats affect your cholesterol levels:  Unhealthy fats increase LDL cholesterol and triglyceride levels.  They are found in foods high in cholesterol, saturated fat, and trans fat:  Cholesterol  is found in eggs, dairy, and meat. Saturated fat  is found in butter, cheese, ice cream, whole milk, and coconut oil. Saturated fat is also found in meat, such as sausage, hot dogs, and bologna. Trans fat  is found in liquid oils and is used in fried and baked foods. Foods that contain trans fats include chips, crackers, muffins, sweet rolls, microwave popcorn, and cookies. Treatment  for high cholesterol will also decrease your risk of heart disease, heart attack, and stroke. Treatment may include any of the following:  Lifestyle changes  may include food, exercise, weight loss, and quitting smoking. You may also need to decrease the amount of alcohol you drink. Your healthcare provider will want you to start with lifestyle changes. Other treatment may be added if lifestyle changes are not enough. Your healthcare provider may recommend you work with a team to manage hyperlipidemia. The team may include medical experts such as a dietitian, an exercise or physical therapist, and a behavior therapist. Your family members may be included in helping you create lifestyle changes. Medicines  may be given to lower your LDL cholesterol, triglyceride levels, or total cholesterol level. You may need medicines to lower your cholesterol if any of the following is true:    You have a history of stroke, TIA, unstable angina, or a heart attack. Your LDL cholesterol level is 190 mg/dL or higher. You are age 36 to 76 years, have diabetes or heart disease risk factors, and your LDL cholesterol is 70 mg/dL or higher. Supplements  include fish oil, red yeast rice, and garlic. Fish oil may help lower your triglyceride and LDL cholesterol levels. It may also increase your HDL cholesterol level. Red yeast rice may help decrease your total cholesterol level and LDL cholesterol level. Garlic may help lower your total cholesterol level. Do not take any supplements without talking to your healthcare provider.     Food changes you can make to lower your cholesterol levels:  A dietitian can help you create a healthy eating plan. Your dietitian can show you how to read food labels and choose foods low in saturated fat, trans fats, and cholesterol. Decrease the total amount of fat you eat. Choose lean meats, fat-free or 1% fat milk, and low-fat dairy products, such as yogurt and cheese. Try to limit or avoid red meats. Limit or do not eat fried foods or baked goods, such as cookies. Replace unhealthy fats with healthy fats. Cook foods in olive oil or canola oil. Choose soft margarines that are low in saturated fat and trans fat. Seeds, nuts, and avocados are other examples of healthy fats. Eat foods with omega-3 fats. Examples include salmon, tuna, mackerel, walnuts, and flaxseed. Eat fish 2 times per week. Pregnant women should not eat fish that have high levels of mercury, such as shark, swordfish, and [place] mackerel. Increase the amount of high-fiber foods you eat. High-fiber foods can help lower your LDL cholesterol. Aim to get between 20 and 30 grams of fiber each day. Fruits and vegetables are high in fiber. Eat at least 5 servings each day. Other high-fiber foods are whole-grain or whole-wheat breads, pastas, or cereals, and brown rice. Eat 3 ounces of whole-grain foods each day. Increase fiber slowly. You may have abdominal discomfort, bloating, and gas if you add fiber to your diet too quickly. Eat healthy protein foods. Examples include low-fat dairy products, skinless chicken and turkey, fish, and nuts. Limit foods and drinks that are high in sugar. Your dietitian or healthcare provider can help you create daily limits for high-sugar foods and drinks. The limit may be lower if you have diabetes or another health condition. Limits can also help you lose weight if needed. Lifestyle changes you can make to lower your cholesterol levels:   Maintain a healthy weight.   Ask your healthcare provider what a healthy weight is for you. Ask your provider to help you create a weight loss plan if needed. Weight loss can decrease your total cholesterol and triglyceride levels. Weight loss may also help keep your blood pressure at a healthy level. Be physically active throughout the day. Physical activity, such as exercise, can help lower your total cholesterol level and maintain a healthy weight. Physical activity can also help increase your HDL cholesterol level. Work with your healthcare provider to create an program that is right for you. Get at least 30 to 40 minutes of moderate physical activity most days of the week. Examples of exercise include brisk walking, swimming, or biking. Also include strength training at least 2 times each week. Your healthcare providers can help you create a physical activity plan. Do not smoke. Nicotine and other chemicals in cigarettes and cigars can raise your cholesterol levels. Ask your healthcare provider for information if you currently smoke and need help to quit. E-cigarettes or smokeless tobacco still contain nicotine. Talk to your healthcare provider before you use these products. Limit or do not drink alcohol. Alcohol can increase your triglyceride levels. Ask your healthcare provider before you drink alcohol. Ask how much is okay for you to drink in 24 hours or 1 week. Follow up with your doctor as directed:  Write down your questions so you remember to ask them during your visits. © Copyright [facility] 2023 Information is for End User's use only and may not be sold, redistributed or otherwise used for commercial purposes. The above information is an  only. It is not intended as medical advice for individual conditions or treatments. Talk to your doctor, nurse or pharmacist before following any medical regimen to see if it is safe and effective for you.

## 2023-11-16 NOTE — ASSESSMENT & PLAN NOTE
Stable  Not in acute exacerbation  Continue albuterol and Advair and montelukast  Highly recommend she rinse her mouth after using Advair  Up-to-date on her pneumococcal and influenza vaccination

## 2023-11-16 NOTE — ASSESSMENT & PLAN NOTE
Hepatitis B IgM positive which suggests acute infection  Hepatitis B surface antigen negative  LFTs are normal  We discussed modes of transmission of the disease  Safe sex counseling provided to patient  We will monitor

## 2023-11-17 ENCOUNTER — TELEPHONE (OUTPATIENT)
Dept: FAMILY MEDICINE CLINIC | Facility: CLINIC | Age: 48
End: 2023-11-17

## 2023-11-24 ENCOUNTER — TELEPHONE (OUTPATIENT)
Dept: FAMILY MEDICINE CLINIC | Facility: CLINIC | Age: 48
End: 2023-11-24

## 2023-11-24 NOTE — TELEPHONE ENCOUNTER
PCP SIGNATURE NEEDED FOR prior authorization form/ Ozempic FORM RECEIVED VIA FAX AND PLACED IN PCP FOLDER TO BE DELIVERED AT ASSIGNED TIMES.          Ozempic (0.25 or 0.5 MG/dose) 2 mg / 3 ml Pen injectors

## 2024-01-29 ENCOUNTER — OFFICE VISIT (OUTPATIENT)
Dept: GASTROENTEROLOGY | Facility: CLINIC | Age: 49
End: 2024-01-29
Payer: COMMERCIAL

## 2024-01-29 VITALS
SYSTOLIC BLOOD PRESSURE: 129 MMHG | HEART RATE: 78 BPM | HEIGHT: 65 IN | DIASTOLIC BLOOD PRESSURE: 81 MMHG | BODY MASS INDEX: 41.23 KG/M2 | WEIGHT: 247.5 LBS | TEMPERATURE: 97.7 F

## 2024-01-29 DIAGNOSIS — K21.9 GASTROESOPHAGEAL REFLUX DISEASE WITHOUT ESOPHAGITIS: Primary | ICD-10-CM

## 2024-01-29 DIAGNOSIS — R10.84 GENERALIZED ABDOMINAL PAIN: ICD-10-CM

## 2024-01-29 DIAGNOSIS — Z12.12 SCREENING FOR COLORECTAL CANCER: ICD-10-CM

## 2024-01-29 DIAGNOSIS — R76.8 HEPATITIS B CORE ANTIBODY POSITIVE: ICD-10-CM

## 2024-01-29 DIAGNOSIS — Z12.11 SCREENING FOR COLORECTAL CANCER: ICD-10-CM

## 2024-01-29 PROCEDURE — 99244 OFF/OP CNSLTJ NEW/EST MOD 40: CPT | Performed by: INTERNAL MEDICINE

## 2024-01-29 RX ORDER — POLYETHYLENE GLYCOL 3350 17 G/17G
238 POWDER, FOR SOLUTION ORAL ONCE
Qty: 238 G | Refills: 0 | Status: SHIPPED | OUTPATIENT
Start: 2024-01-29 | End: 2024-01-29

## 2024-01-29 RX ORDER — CHLORHEXIDINE GLUCONATE ORAL RINSE 1.2 MG/ML
15 SOLUTION DENTAL 2 TIMES DAILY
COMMUNITY
Start: 2023-11-04

## 2024-01-29 RX ORDER — ACETAMINOPHEN AND CODEINE PHOSPHATE 300; 30 MG/1; MG/1
1 TABLET ORAL EVERY 6 HOURS PRN
COMMUNITY
Start: 2023-11-04

## 2024-01-29 RX ORDER — BISACODYL 5 MG/1
10 TABLET, DELAYED RELEASE ORAL ONCE
Qty: 2 TABLET | Refills: 0 | Status: SHIPPED | OUTPATIENT
Start: 2024-01-29 | End: 2024-01-29

## 2024-01-29 NOTE — PROGRESS NOTES
[facility] - Outpatient Consultation  Sydnee Bourgeois 49 y.o. female MRN: [id number]  Encounter: [id number]        ASSESSMENT AND PLAN     48 yo Tongan speaking F with history of GERD, TERESA, T2DM, class III obesity, asthma and lower back pain who presents as new pt regarding CRC screening who endorses occasional abd cramping and GERD likely in the setting of constipation and GLP-1 agonist use.     1. Screening for colorectal cancer  Plan for colonoscopy with dulcolax/miralax prep for CRC screening.   - Ambulatory referral to Gastroenterology  - Colonoscopy; Future  - polyethylene glycol (MiraLax) 17 GM/SCOOP powder; Take 238 g by mouth once for 1 dose Take 238 g my mouth. Use as directed  Dispense: 238 g; Refill: 0  - bisacodyl (DULCOLAX) 5 mg EC tablet; Take 2 tablets (10 mg total) by mouth once for 1 dose  Dispense: 2 tablet; Refill: 0    2. Gastroesophageal reflux disease without esophagitis  3. Generalized abdominal pain   - continue pantoprazole 40 mg po daily  - discussed lifestyle modifications   - discussed avoiding trigger foods and possibility of GLP-1 agonist causing AE    4. Hepatitis B core antibody positive  Differential includes false positive, HBV infection in window period or cleared infection. Will repeat full HBV serologies to evaluate.   - Hepatitis B surface antibody; Future  - Hepatitis B surface antigen; Future  - Hepatitis B core antibody, total; Future  - Hepatitis B core antibody, IgM; Future        HISTORY OF PRESENT ILLNESS     Sydnee Bourgeois is our 48 yo Tongan speaking F with history of GERD, TERESA, T2DM, class III obesity, asthma and lower back pain who presents as new pt regarding CRC screening. She feels like after a BM she will not have complete evacuation of stool, but does not feel constipated. She has a solid BM daily and does not have to strain. Denies melena or hematochezia.  Occasionally does have abd cramping. Continues on  pantoprazole 40 mg po daily. She is on Ozempic. HBV core IgM reactive, but HBsAg non-reactive and total ab non-reactive. HCV ab non-reactive. EGD from 2014 showing gastritis with gastric biopsies negative for H pylori. No previous colonoscopy. Denies fevers, chills, nausea, vomiting, weight loss, abd pain, diarrhea, constipation, dysphagia, odynophagia. FH of CRC only in uncle. Denies history of IV drug use, does have tattoos.       REVIEW OF SYSTEMS     CONSTITUTIONAL: Denies any fever, chills, rigors, and weight loss.  HEENT: No earache or tinnitus. Denies hearing loss or visual disturbances.  CARDIOVASCULAR: No chest pain or palpitations.   RESPIRATORY: Denies any cough, hemoptysis, shortness of breath or dyspnea on exertion.  GASTROINTESTINAL: As noted in the History of Present Illness.   GENITOURINARY: No problems with urination. Denies any hematuria or dysuria.  NEUROLOGIC: No dizziness or vertigo, denies headaches.   MUSCULOSKELETAL: Denies any muscle or joint pain.   SKIN: Denies skin rashes or itching.   ENDOCRINE: Denies excessive thirst. Denies intolerance to heat or cold.  PSYCHOSOCIAL: Denies depression or anxiety. Denies any recent memory loss.       Historical information     Past Medical History:   Diagnosis Date    Anemia     Anxiety     Arthritis     hands, knee    Asthma     uses inhalers for the same. No recnt flairups    Back pain     Carpal tunnel syndrome     Bilateral    Constipation     Depression     Diabetes mellitus (HCC)     Environmental allergies     GERD (gastroesophageal reflux disease)     H/O cardiac murmur     Insomnia     Kidney stone     Migraine     Psychiatric disorder     anxiety    Pyelonephritis     Sleep apnea, obstructive     Wears glasses      Past Surgical History:   Procedure Laterality Date    BACK SURGERY      States she isn't sure what she had done-possibly something to do with a lump or muscle    CHOLECYSTECTOMY      CYSTOSCOPY      onset: 6/1/16, resolved: 6/1/16     ESOPHAGOGASTRODUODENOSCOPY      WA LIG&DIV LONG SAPH VEIN SAPHFEM JUNCT/INTERRUPJ Left 2016    Procedure: LIGATION/STRIPPING VEIN, LIGATION OF ANTERIOR TRIBUTARY GREATER SAPHENOUS VEIN BRANCH;  Surgeon: Agatha Kaplan DO;  [facility];  Service: Vascular    WA STAB PHLEBT VARICOSE VEINS 1 XTR 10-20 STAB INCS Left 2016    Procedure: MULTIPLE STAB PHLEBECTOMIES;  Surgeon: Agatha Kaplan DO;  [facility];  Service: Vascular    WA STAB PHLEBT VARICOSE VEINS 1 XTR > 20 INCS Right 2021    Procedure: MULTIPLE STAB PHLEBECTOMIES;  Surgeon: Agatha Kaplan DO;  [facility];  Service: Vascular    TUBAL LIGATION      VARICOSE VEIN SURGERY Left     Left leg    VEIN LIGATION Right 2017    Procedure: LIGATION GREATER SAPHENOUS VEIN TRIBUTARY WITH STAB PHLEBECTOMIES ;  Surgeon: Agatha Kaplan DO;  [facility];  Service:      Social History   Social History     Substance and Sexual Activity   Alcohol Use Yes    Comment: occasionally     Social History     Substance and Sexual Activity   Drug Use Never     Social History     Tobacco Use   Smoking Status Former    Current packs/day: 0.00    Average packs/day: 2.0 packs/day for 24.6 years (49.2 ttl pk-yrs)    Types: Cigarettes    Start date: 1995    Quit date: 2019    Years since quittin.4   Smokeless Tobacco Never     Family History   Problem Relation Age of Onset    Asthma Mother     Coronary artery disease Mother     Depression Mother     Diabetes Mother     Hypertension Mother     Varicose Veins Mother     Asthma Father     Hypertension Father     Lung cancer Father     Diabetes Father     Breast cancer Sister 45    No Known Problems Daughter     No Known Problems Maternal Grandmother     Colon cancer Maternal Grandfather     No Known Problems Paternal Grandmother     No Known Problems Paternal Grandfather     Breast cancer Maternal Aunt 50    Breast cancer Maternal Aunt 60    Ovarian cancer  "Maternal Aunt        Allergies       Current Outpatient Medications:     acetaminophen-codeine (TYLENOL with CODEINE #3) 300-30 MG per tablet    albuterol (2.5 mg/3 mL) 0.083 % nebulizer solution    albuterol (PROVENTIL HFA,VENTOLIN HFA) 90 mcg/act inhaler    azelastine (ASTELIN) 0.1 % nasal spray    benzonatate (TESSALON) 200 MG capsule    bisacodyl (DULCOLAX) 5 mg EC tablet    chlorhexidine (PERIDEX) 0.12 % solution    Cholecalciferol (Vitamin D) 50 MCG (2000 UT) tablet    clonazePAM (KlonoPIN) 0.5 mg tablet    clonazePAM (KlonoPIN) 1 mg tablet    escitalopram (LEXAPRO) 20 mg tablet    fexofenadine (ALLEGRA) 60 MG tablet    fluticasone (FLONASE) 50 mcg/act nasal spray    fluticasone-salmeterol (Advair HFA) 230-21 MCG/ACT inhaler    gabapentin (NEURONTIN) 600 MG tablet    ibuprofen (MOTRIN) 400 mg tablet    lidocaine (Lidoderm) 5 %    Melatonin 5 MG TABS    metFORMIN (GLUCOPHAGE) 1000 MG tablet    methocarbamol (ROBAXIN) 750 mg tablet    Misc. Devices (PULSE OXIMETER) MISC    montelukast (SINGULAIR) 10 mg tablet    pantoprazole (PROTONIX) 40 mg tablet    polyethylene glycol (MiraLax) 17 GM/SCOOP powder    rosuvastatin (CRESTOR) 10 MG tablet    semaglutide, 0.25 or 0.5 mg/dose, (Ozempic, 0.25 or 0.5 MG/DOSE,) 2 mg/3 mL injection pen    traZODone (DESYREL) 100 mg tablet    buPROPion (WELLBUTRIN SR) 200 MG 12 hr tablet    terbinafine (LamISIL) 1 % cream    No Known Allergies        Objective assessment       Blood pressure 129/81, pulse 78, temperature 97.7 °F (36.5 °C), temperature source Tympanic, height 5' 5\" (1.651 m), weight 112 kg (247 lb 8 oz), last menstrual period 09/01/2019, not currently breastfeeding. Body mass index is 41.19 kg/m².        PHYSICAL EXAM:         Physical Exam:   GENERAL: NAD, obese  HEENT:  NC/AT, MMM, no scleral icterus  CARDIAC:  Regular rate and rhythm  PULMONARY:  No respiratory distress, no wheezing/rales/rhonci, non-labored breathing  ABDOMEN:  Soft, mild abd tenderness no " distention, +BS, no rebound/guarding/rigidity  Extremities:  No edema, cyanosis, or clubbing  NEUROLOGIC:  Alert/oriented x3.   SKIN:  No rashes or erythema      Lab Results:      No visits with results within 1 Day(s) from this visit.   Latest known visit with results is:   Office Visit on 11/16/2023   Component Date Value    Hemoglobin A1C 11/16/2023 7.4 (A)          Radiology Results:      No results found.      Cody Joseph MD  Gastroenteroloy Fellow

## 2024-02-08 DIAGNOSIS — K21.9 GASTROESOPHAGEAL REFLUX DISEASE WITHOUT ESOPHAGITIS: ICD-10-CM

## 2024-02-08 DIAGNOSIS — J30.89 ENVIRONMENTAL AND SEASONAL ALLERGIES: ICD-10-CM

## 2024-02-08 DIAGNOSIS — R05.9 COUGH: ICD-10-CM

## 2024-02-08 RX ORDER — PANTOPRAZOLE SODIUM 40 MG/1
40 TABLET, DELAYED RELEASE ORAL DAILY
Qty: 30 TABLET | Refills: 0 | Status: SHIPPED | OUTPATIENT
Start: 2024-02-08

## 2024-02-08 RX ORDER — FEXOFENADINE HCL 60 MG/1
60 TABLET, FILM COATED ORAL DAILY
Qty: 30 TABLET | Refills: 2 | Status: SHIPPED | OUTPATIENT
Start: 2024-02-08

## 2024-02-09 ENCOUNTER — APPOINTMENT (OUTPATIENT)
Dept: LAB | Facility: HOSPITAL | Age: 49
End: 2024-02-09
Payer: COMMERCIAL

## 2024-02-09 DIAGNOSIS — F33.2 MODERATELY SEVERE RECURRENT MAJOR DEPRESSION (HCC): ICD-10-CM

## 2024-02-09 DIAGNOSIS — F41.1 GENERALIZED ANXIETY DISORDER: ICD-10-CM

## 2024-02-09 DIAGNOSIS — R76.8 HEPATITIS B CORE ANTIBODY POSITIVE: ICD-10-CM

## 2024-02-09 LAB
25(OH)D3 SERPL-MCNC: 35.2 NG/ML (ref 30–100)
ALBUMIN SERPL BCP-MCNC: 3.8 G/DL (ref 3.5–5)
ALP SERPL-CCNC: 80 U/L (ref 34–104)
ALT SERPL W P-5'-P-CCNC: 79 U/L (ref 7–52)
ANION GAP SERPL CALCULATED.3IONS-SCNC: 6 MMOL/L
AST SERPL W P-5'-P-CCNC: 86 U/L (ref 13–39)
BACTERIA UR QL AUTO: ABNORMAL /HPF
BILIRUB SERPL-MCNC: 0.51 MG/DL (ref 0.2–1)
BILIRUB UR QL STRIP: ABNORMAL
BUN SERPL-MCNC: 10 MG/DL (ref 5–25)
CALCIUM SERPL-MCNC: 9.1 MG/DL (ref 8.4–10.2)
CHLORIDE SERPL-SCNC: 106 MMOL/L (ref 96–108)
CHOLEST SERPL-MCNC: 155 MG/DL
CLARITY UR: CLEAR
CO2 SERPL-SCNC: 28 MMOL/L (ref 21–32)
COLOR UR: YELLOW
CREAT SERPL-MCNC: 0.71 MG/DL (ref 0.6–1.3)
ERYTHROCYTE [DISTWIDTH] IN BLOOD BY AUTOMATED COUNT: 12.6 % (ref 11.6–15.1)
GFR SERPL CREATININE-BSD FRML MDRD: 98 ML/MIN/1.73SQ M
GLUCOSE P FAST SERPL-MCNC: 111 MG/DL (ref 65–99)
GLUCOSE UR STRIP-MCNC: NEGATIVE MG/DL
HBV CORE AB SER QL: NORMAL
HBV CORE IGM SER QL: NORMAL
HBV SURFACE AB SER-ACNC: <3 MIU/ML
HBV SURFACE AG SER QL: NORMAL
HCT VFR BLD AUTO: 39.9 % (ref 34.8–46.1)
HDLC SERPL-MCNC: 34 MG/DL
HGB BLD-MCNC: 13 G/DL (ref 11.5–15.4)
HGB UR QL STRIP.AUTO: ABNORMAL
KETONES UR STRIP-MCNC: NEGATIVE MG/DL
LDLC SERPL CALC-MCNC: 100 MG/DL (ref 0–100)
LEUKOCYTE ESTERASE UR QL STRIP: ABNORMAL
MCH RBC QN AUTO: 30.4 PG (ref 26.8–34.3)
MCHC RBC AUTO-ENTMCNC: 32.6 G/DL (ref 31.4–37.4)
MCV RBC AUTO: 93 FL (ref 82–98)
MUCOUS THREADS UR QL AUTO: ABNORMAL
NITRITE UR QL STRIP: NEGATIVE
NON-SQ EPI CELLS URNS QL MICRO: ABNORMAL /HPF
NONHDLC SERPL-MCNC: 121 MG/DL
PH UR STRIP.AUTO: 6.5 [PH]
PLATELET # BLD AUTO: 171 THOUSANDS/UL (ref 149–390)
PMV BLD AUTO: 10.3 FL (ref 8.9–12.7)
POTASSIUM SERPL-SCNC: 4 MMOL/L (ref 3.5–5.3)
PROT SERPL-MCNC: 7.5 G/DL (ref 6.4–8.4)
PROT UR STRIP-MCNC: NEGATIVE MG/DL
RBC # BLD AUTO: 4.27 MILLION/UL (ref 3.81–5.12)
RBC #/AREA URNS AUTO: ABNORMAL /HPF
SODIUM SERPL-SCNC: 140 MMOL/L (ref 135–147)
SP GR UR STRIP.AUTO: 1.02 (ref 1–1.03)
TRIGL SERPL-MCNC: 105 MG/DL
TSH SERPL DL<=0.05 MIU/L-ACNC: 1.23 UIU/ML (ref 0.45–4.5)
UROBILINOGEN UR QL STRIP.AUTO: 4 E.U./DL
WBC # BLD AUTO: 5.96 THOUSAND/UL (ref 4.31–10.16)
WBC #/AREA URNS AUTO: ABNORMAL /HPF

## 2024-02-09 PROCEDURE — 82306 VITAMIN D 25 HYDROXY: CPT

## 2024-02-09 PROCEDURE — 83036 HEMOGLOBIN GLYCOSYLATED A1C: CPT

## 2024-02-09 PROCEDURE — 80061 LIPID PANEL: CPT

## 2024-02-09 PROCEDURE — 85027 COMPLETE CBC AUTOMATED: CPT

## 2024-02-09 PROCEDURE — 81001 URINALYSIS AUTO W/SCOPE: CPT

## 2024-02-09 PROCEDURE — 87340 HEPATITIS B SURFACE AG IA: CPT

## 2024-02-09 PROCEDURE — 86704 HEP B CORE ANTIBODY TOTAL: CPT

## 2024-02-09 PROCEDURE — 36415 COLL VENOUS BLD VENIPUNCTURE: CPT

## 2024-02-09 PROCEDURE — 84443 ASSAY THYROID STIM HORMONE: CPT

## 2024-02-09 PROCEDURE — 86706 HEP B SURFACE ANTIBODY: CPT

## 2024-02-09 PROCEDURE — 86705 HEP B CORE ANTIBODY IGM: CPT

## 2024-02-09 PROCEDURE — 80053 COMPREHEN METABOLIC PANEL: CPT

## 2024-02-10 LAB
EST. AVERAGE GLUCOSE BLD GHB EST-MCNC: 163 MG/DL
HBA1C MFR BLD: 7.3 %

## 2024-02-20 ENCOUNTER — TELEPHONE (OUTPATIENT)
Dept: GASTROENTEROLOGY | Facility: HOSPITAL | Age: 49
End: 2024-02-20

## 2024-02-21 ENCOUNTER — NURSE TRIAGE (OUTPATIENT)
Age: 49
End: 2024-02-21

## 2024-02-21 DIAGNOSIS — Z12.11 SCREENING FOR COLORECTAL CANCER: Primary | ICD-10-CM

## 2024-02-21 DIAGNOSIS — Z12.12 SCREENING FOR COLORECTAL CANCER: Primary | ICD-10-CM

## 2024-02-21 RX ORDER — BISACODYL 5 MG/1
TABLET, DELAYED RELEASE ORAL
Qty: 2 TABLET | Refills: 0 | Status: SHIPPED | OUTPATIENT
Start: 2024-02-21

## 2024-02-21 NOTE — TELEPHONE ENCOUNTER
Regarding: Medication  ----- Message from Driss Navarrete sent at 2/21/2024  9:13 AM EST -----  Pt has a colonoscopy tomorrow and has the Feliciano/Dul but we sent it as a script for her and they only prescribed 2 dulcolax pills when its is usually 4 total, I asked the Nursing team and they confirm she will need the other 2 dulcolax to complete her prep. Can we please sent a script for her for the remaining 2 dulcolax? She will needs this for today.

## 2024-02-21 NOTE — TELEPHONE ENCOUNTER
"I called into [facility] and dictated the additional 2 Dulcolax 5 mg tabs. Rx noted as phone in sent to provider to sign off.   Answer Assessment - Initial Assessment Questions  1. REASON FOR CALL or QUESTION: \"What is your reason for calling today?\" or \"How can I best help you?\" or \"What question do you have that I can help answer?\"      Patient requires additional 2 Dulcolax 5 mg tabs prior to procedure. (MiraLax/Dulcolax prep is total of 4 tabs) order sent in was for only 2.    Protocols used: Information Only Call - No Triage-ADULT-[place]    "

## 2024-02-22 ENCOUNTER — ANESTHESIA EVENT (OUTPATIENT)
Dept: GASTROENTEROLOGY | Facility: HOSPITAL | Age: 49
End: 2024-02-22

## 2024-02-22 ENCOUNTER — HOSPITAL ENCOUNTER (OUTPATIENT)
Dept: GASTROENTEROLOGY | Facility: HOSPITAL | Age: 49
Setting detail: OUTPATIENT SURGERY
End: 2024-02-22
Payer: COMMERCIAL

## 2024-02-22 ENCOUNTER — ANESTHESIA (OUTPATIENT)
Dept: GASTROENTEROLOGY | Facility: HOSPITAL | Age: 49
End: 2024-02-22

## 2024-02-22 VITALS
WEIGHT: 247 LBS | RESPIRATION RATE: 18 BRPM | SYSTOLIC BLOOD PRESSURE: 142 MMHG | BODY MASS INDEX: 41.15 KG/M2 | HEIGHT: 65 IN | OXYGEN SATURATION: 99 % | TEMPERATURE: 97.8 F | DIASTOLIC BLOOD PRESSURE: 80 MMHG | HEART RATE: 71 BPM

## 2024-02-22 DIAGNOSIS — Z12.12 SCREENING FOR COLORECTAL CANCER: ICD-10-CM

## 2024-02-22 DIAGNOSIS — Z12.11 SCREENING FOR COLORECTAL CANCER: ICD-10-CM

## 2024-02-22 PROCEDURE — 88305 TISSUE EXAM BY PATHOLOGIST: CPT | Performed by: PATHOLOGY

## 2024-02-22 PROCEDURE — 45385 COLONOSCOPY W/LESION REMOVAL: CPT | Performed by: INTERNAL MEDICINE

## 2024-02-22 PROCEDURE — 82948 REAGENT STRIP/BLOOD GLUCOSE: CPT

## 2024-02-22 RX ORDER — SODIUM CHLORIDE, SODIUM LACTATE, POTASSIUM CHLORIDE, CALCIUM CHLORIDE 600; 310; 30; 20 MG/100ML; MG/100ML; MG/100ML; MG/100ML
100 INJECTION, SOLUTION INTRAVENOUS CONTINUOUS
Status: DISCONTINUED | OUTPATIENT
Start: 2024-02-22 | End: 2024-02-26 | Stop reason: HOSPADM

## 2024-02-22 RX ORDER — SODIUM CHLORIDE, SODIUM LACTATE, POTASSIUM CHLORIDE, CALCIUM CHLORIDE 600; 310; 30; 20 MG/100ML; MG/100ML; MG/100ML; MG/100ML
125 INJECTION, SOLUTION INTRAVENOUS CONTINUOUS
Status: DISCONTINUED | OUTPATIENT
Start: 2024-02-22 | End: 2024-02-26 | Stop reason: HOSPADM

## 2024-02-22 RX ORDER — SODIUM CHLORIDE, SODIUM LACTATE, POTASSIUM CHLORIDE, CALCIUM CHLORIDE 600; 310; 30; 20 MG/100ML; MG/100ML; MG/100ML; MG/100ML
INJECTION, SOLUTION INTRAVENOUS CONTINUOUS PRN
Status: DISCONTINUED | OUTPATIENT
Start: 2024-02-22 | End: 2024-02-22

## 2024-02-22 RX ORDER — PROPOFOL 10 MG/ML
INJECTION, EMULSION INTRAVENOUS AS NEEDED
Status: DISCONTINUED | OUTPATIENT
Start: 2024-02-22 | End: 2024-02-22

## 2024-02-22 RX ADMIN — PROPOFOL 50 MG: 10 INJECTION, EMULSION INTRAVENOUS at 09:32

## 2024-02-22 RX ADMIN — PROPOFOL 50 MG: 10 INJECTION, EMULSION INTRAVENOUS at 09:36

## 2024-02-22 RX ADMIN — PROPOFOL 50 MG: 10 INJECTION, EMULSION INTRAVENOUS at 09:25

## 2024-02-22 RX ADMIN — PROPOFOL 50 MG: 10 INJECTION, EMULSION INTRAVENOUS at 09:29

## 2024-02-22 RX ADMIN — SODIUM CHLORIDE, SODIUM LACTATE, POTASSIUM CHLORIDE, AND CALCIUM CHLORIDE: .6; .31; .03; .02 INJECTION, SOLUTION INTRAVENOUS at 08:50

## 2024-02-22 RX ADMIN — PROPOFOL 50 MG: 10 INJECTION, EMULSION INTRAVENOUS at 09:41

## 2024-02-22 RX ADMIN — PROPOFOL 50 MG: 10 INJECTION, EMULSION INTRAVENOUS at 09:27

## 2024-02-22 RX ADMIN — PROPOFOL 30 MG: 10 INJECTION, EMULSION INTRAVENOUS at 09:47

## 2024-02-22 RX ADMIN — PROPOFOL 150 MG: 10 INJECTION, EMULSION INTRAVENOUS at 09:22

## 2024-02-22 RX ADMIN — PROPOFOL 30 MG: 10 INJECTION, EMULSION INTRAVENOUS at 09:44

## 2024-02-22 NOTE — H&P
History and Physical - [facility]  Sydnee Bourgeois 49 y.o. female MRN: [id number]                  HPI: Sydnee Bourgeois is a 49 y.o. year old female who presents for colon cancer screening.      REVIEW OF SYSTEMS: Per the HPI, and otherwise unremarkable.    Historical Information   Past Medical History:   Diagnosis Date    Anemia     Anxiety     Arthritis     hands, knee    Asthma     uses inhalers for the same. No recnt flairups    Back pain     Carpal tunnel syndrome     Bilateral    Constipation     Depression     Diabetes mellitus (HCC)     Environmental allergies     GERD (gastroesophageal reflux disease)     H/O cardiac murmur     Insomnia     Kidney stone     Migraine     Psychiatric disorder     anxiety    Pyelonephritis     Sleep apnea, obstructive     Wears glasses      Past Surgical History:   Procedure Laterality Date    BACK SURGERY      States she isn't sure what she had done-possibly something to do with a lump or muscle    CHOLECYSTECTOMY      CYSTOSCOPY      onset: 6/1/16, resolved: 6/1/16    ESOPHAGOGASTRODUODENOSCOPY      AR LIG&DIV LONG SAPH VEIN SAPHFEM JUNCT/INTERRUPJ Left 12/22/2016    Procedure: LIGATION/STRIPPING VEIN, LIGATION OF ANTERIOR TRIBUTARY GREATER SAPHENOUS VEIN BRANCH;  Surgeon: Agatha Kaplan DO;  [facility];  Service: Vascular    AR STAB PHLEBT VARICOSE VEINS 1 XTR 10-20 STAB INCS Left 12/22/2016    Procedure: MULTIPLE STAB PHLEBECTOMIES;  Surgeon: Agatha Kaplan DO;  [facility];  Service: Vascular    AR STAB PHLEBT VARICOSE VEINS 1 XTR > 20 INCS Right 11/5/2021    Procedure: MULTIPLE STAB PHLEBECTOMIES;  Surgeon: Agatha Kaplan DO;  [facility];  Service: Vascular    TUBAL LIGATION      VARICOSE VEIN SURGERY Left     Left leg    VEIN LIGATION Right 6/5/2017    Procedure: LIGATION GREATER SAPHENOUS VEIN TRIBUTARY WITH STAB PHLEBECTOMIES ;  Surgeon: Agatha Kaplan DO;  [facility];   Service:      Social History   Social History     Substance and Sexual Activity   Alcohol Use Yes    Comment: occasionally     Social History     Substance and Sexual Activity   Drug Use Never     Social History     Tobacco Use   Smoking Status Former    Current packs/day: 0.00    Average packs/day: 2.0 packs/day for 24.6 years (49.2 ttl pk-yrs)    Types: Cigarettes    Start date: 1995    Quit date: 2019    Years since quittin.5   Smokeless Tobacco Never     Family History   Problem Relation Age of Onset    Asthma Mother     Coronary artery disease Mother     Depression Mother     Diabetes Mother     Hypertension Mother     Varicose Veins Mother     Asthma Father     Hypertension Father     Lung cancer Father     Diabetes Father     Breast cancer Sister 45    No Known Problems Daughter     No Known Problems Maternal Grandmother     Colon cancer Maternal Grandfather     No Known Problems Paternal Grandmother     No Known Problems Paternal Grandfather     Breast cancer Maternal Aunt 50    Breast cancer Maternal Aunt 60    Ovarian cancer Maternal Aunt        Meds/Allergies       Current Outpatient Medications:     azelastine (ASTELIN) 0.1 % nasal spray    bisacodyl (DULCOLAX) 5 mg EC tablet    clonazePAM (KlonoPIN) 1 mg tablet    escitalopram (LEXAPRO) 20 mg tablet    fexofenadine (ALLEGRA) 60 MG tablet    fluticasone (FLONASE) 50 mcg/act nasal spray    fluticasone-salmeterol (Advair HFA) 230-21 MCG/ACT inhaler    gabapentin (NEURONTIN) 600 MG tablet    ibuprofen (MOTRIN) 400 mg tablet    lidocaine (Lidoderm) 5 %    Melatonin 5 MG TABS    metFORMIN (GLUCOPHAGE) 1000 MG tablet    polyethylene glycol (MiraLax) 17 GM/SCOOP powder    traZODone (DESYREL) 100 mg tablet    acetaminophen-codeine (TYLENOL with CODEINE #3) 300-30 MG per tablet    albuterol (2.5 mg/3 mL) 0.083 % nebulizer solution    albuterol (PROVENTIL HFA,VENTOLIN HFA) 90 mcg/act inhaler    benzonatate (TESSALON) 200 MG capsule    bisacodyl  "(DULCOLAX) 5 mg EC tablet    buPROPion (WELLBUTRIN SR) 200 MG 12 hr tablet    chlorhexidine (PERIDEX) 0.12 % solution    Cholecalciferol (Vitamin D) 50 MCG (2000 UT) tablet    clonazePAM (KlonoPIN) 0.5 mg tablet    methocarbamol (ROBAXIN) 750 mg tablet    Misc. Devices (PULSE OXIMETER) MISC    montelukast (SINGULAIR) 10 mg tablet    pantoprazole (PROTONIX) 40 mg tablet    rosuvastatin (CRESTOR) 10 MG tablet    semaglutide, 0.25 or 0.5 mg/dose, (Ozempic, 0.25 or 0.5 MG/DOSE,) 2 mg/3 mL injection pen    terbinafine (LamISIL) 1 % cream    Current Facility-Administered Medications:     lactated ringers infusion, 125 mL/hr, Intravenous, Continuous    Facility-Administered Medications Ordered in Other Encounters:     lactated ringers infusion, , Intravenous, Continuous PRN, New Bag at 02/22/24 0850    No Known Allergies    Objective     /82   Pulse 77   Temp 97.8 °F (36.6 °C) (Temporal)   Resp 18   Ht 5' 5\" (1.651 m)   Wt 112 kg (247 lb)   LMP 09/01/2019 (Approximate)   SpO2 98%   BMI 41.10 kg/m²       PHYSICAL EXAM    Gen: NAD  Head: NCAT  CV: RRR  CHEST: Clear  ABD: soft, NT/ND  EXT: no edema      ASSESSMENT/PLAN:  This is a 49 y.o. year old female here for colonoscopy, and she is stable and optimized for her procedure.        "

## 2024-02-22 NOTE — ANESTHESIA POSTPROCEDURE EVALUATION
Post-Op Assessment Note    CV Status:  Stable  Pain Score: 0    Pain management: adequate       Mental Status:  Alert and awake   Hydration Status:  Stable   PONV Controlled:  None   Airway Patency:  Patent     Post Op Vitals Reviewed: Yes    No anethesia notable event occurred.    Staff: CRNA           BP      Temp      Pulse     Resp      SpO2

## 2024-02-22 NOTE — ANESTHESIA PREPROCEDURE EVALUATION
Procedure:  COLONOSCOPY    Relevant Problems   CARDIO   (+) Varicose veins of left leg with edema      ENDO   (+) Type 2 diabetes mellitus without complication, without long-term current use of insulin (HCC)      GI/HEPATIC   (+) Acute viral hepatitis B without coma and without delta agent   (+) Gastroesophageal reflux disease      MUSCULOSKELETAL   (+) Chronic bilateral low back pain with bilateral sciatica   (+) Lumbar spondylosis   (+) Sacroiliitis (HCC)      NEURO/PSYCH   (+) Anxiety   (+) Chronic bilateral low back pain with bilateral sciatica   (+) Chronic pain syndrome   (+) Recurrent major depressive disorder, in partial remission (HCC)      PULMONARY   (+) Moderate asthma   (+) TERESA (obstructive sleep apnea)      Digestive   (+) Thrush      Musculoskeletal and Integument   (+) Lumbar disc herniation   (+) Tinea corporis      Other   (+) Abnormal laboratory test result   (+) Apnea   (+) Environmental and seasonal allergies   (+) Hematoma of scalp   (+) Leg swelling   (+) Morbid obesity (HCC)   (+) Obesity (BMI 35.0-39.9 without comorbidity)   (+) Pain in both hands   (+) Polyarthralgia   (+) Preop examination   (+) Restless leg syndrome   (+) Vitamin D deficiency        Physical Exam    Airway    Mallampati score: II  TM Distance: <3 FB  Neck ROM: full     Dental   No notable dental hx     Cardiovascular  Cardiovascular exam normal    Pulmonary  Pulmonary exam normal     Other Findings  post-pubertal.      Anesthesia Plan  ASA Score- 3     Anesthesia Type- IV sedation with anesthesia with ASA Monitors.         Additional Monitors:     Airway Plan:            Plan Factors-Exercise tolerance (METS): <4 METS.    Chart reviewed.  Imaging results reviewed. Existing labs reviewed. Patient summary reviewed.    Patient is not a current smoker. Patient not instructed to abstain from smoking on day of procedure. Patient did not smoke on day of surgery.            Induction- intravenous.    Postoperative Plan-      Informed Consent- Anesthetic plan and risks discussed with patient.  I personally reviewed this patient with the CRNA. Discussed and agreed on the Anesthesia Plan with the CRNA..

## 2024-02-23 LAB — GLUCOSE SERPL-MCNC: 118 MG/DL (ref 65–140)

## 2024-02-26 PROCEDURE — 88305 TISSUE EXAM BY PATHOLOGIST: CPT | Performed by: PATHOLOGY

## 2024-02-27 DIAGNOSIS — J45.40 MODERATE PERSISTENT ASTHMA WITHOUT COMPLICATION: ICD-10-CM

## 2024-02-27 RX ORDER — MONTELUKAST SODIUM 10 MG/1
10 TABLET ORAL
Qty: 30 TABLET | Refills: 2 | Status: SHIPPED | OUTPATIENT
Start: 2024-02-27 | End: 2024-02-28 | Stop reason: SDUPTHER

## 2024-02-28 DIAGNOSIS — G47.00 INSOMNIA, UNSPECIFIED TYPE: ICD-10-CM

## 2024-02-28 DIAGNOSIS — J45.40 MODERATE PERSISTENT ASTHMA WITHOUT COMPLICATION: ICD-10-CM

## 2024-02-28 DIAGNOSIS — J30.89 ENVIRONMENTAL AND SEASONAL ALLERGIES: ICD-10-CM

## 2024-02-28 DIAGNOSIS — M51.26 LUMBAR DISC HERNIATION: ICD-10-CM

## 2024-02-28 DIAGNOSIS — M54.42 CHRONIC BILATERAL LOW BACK PAIN WITH BILATERAL SCIATICA: ICD-10-CM

## 2024-02-28 DIAGNOSIS — G89.29 CHRONIC BILATERAL LOW BACK PAIN WITH BILATERAL SCIATICA: ICD-10-CM

## 2024-02-28 DIAGNOSIS — B37.0 THRUSH: Primary | ICD-10-CM

## 2024-02-28 DIAGNOSIS — E11.9 TYPE 2 DIABETES MELLITUS WITHOUT COMPLICATION, WITHOUT LONG-TERM CURRENT USE OF INSULIN (HCC): ICD-10-CM

## 2024-02-28 DIAGNOSIS — R05.9 COUGH: ICD-10-CM

## 2024-02-28 DIAGNOSIS — M54.41 CHRONIC BILATERAL LOW BACK PAIN WITH BILATERAL SCIATICA: ICD-10-CM

## 2024-02-28 DIAGNOSIS — K21.9 GASTROESOPHAGEAL REFLUX DISEASE WITHOUT ESOPHAGITIS: ICD-10-CM

## 2024-02-28 DIAGNOSIS — E55.9 VITAMIN D DEFICIENCY: ICD-10-CM

## 2024-02-29 DIAGNOSIS — B37.0 THRUSH: ICD-10-CM

## 2024-02-29 RX ORDER — CHOLECALCIFEROL (VITAMIN D3) 125 MCG
5 CAPSULE ORAL
Qty: 30 TABLET | Refills: 0 | Status: SHIPPED | OUTPATIENT
Start: 2024-02-29

## 2024-02-29 RX ORDER — ACETAMINOPHEN AND CODEINE PHOSPHATE 300; 30 MG/1; MG/1
1 TABLET ORAL EVERY 6 HOURS PRN
Qty: 30 TABLET | Refills: 0 | OUTPATIENT
Start: 2024-02-29

## 2024-02-29 RX ORDER — METHOCARBAMOL 750 MG/1
750 TABLET, FILM COATED ORAL EVERY 8 HOURS SCHEDULED
Qty: 90 TABLET | Refills: 0 | Status: SHIPPED | OUTPATIENT
Start: 2024-02-29

## 2024-02-29 RX ORDER — CHLORHEXIDINE GLUCONATE ORAL RINSE 1.2 MG/ML
15 SOLUTION DENTAL 2 TIMES DAILY
Qty: 120 ML | Refills: 0 | Status: SHIPPED | OUTPATIENT
Start: 2024-02-29 | End: 2024-02-29

## 2024-02-29 RX ORDER — MONTELUKAST SODIUM 10 MG/1
10 TABLET ORAL
Qty: 30 TABLET | Refills: 0 | Status: SHIPPED | OUTPATIENT
Start: 2024-02-29

## 2024-02-29 RX ORDER — GABAPENTIN 600 MG/1
600 TABLET ORAL 3 TIMES DAILY
Qty: 90 TABLET | Refills: 0 | Status: SHIPPED | OUTPATIENT
Start: 2024-02-29

## 2024-02-29 RX ORDER — CHOLECALCIFEROL (VITAMIN D3) 50 MCG
2000 TABLET ORAL DAILY
Qty: 90 TABLET | Refills: 0 | Status: SHIPPED | OUTPATIENT
Start: 2024-02-29

## 2024-02-29 RX ORDER — IBUPROFEN 400 MG/1
400 TABLET ORAL EVERY 8 HOURS PRN
Qty: 30 TABLET | Refills: 0 | Status: SHIPPED | OUTPATIENT
Start: 2024-02-29

## 2024-02-29 RX ORDER — PANTOPRAZOLE SODIUM 40 MG/1
40 TABLET, DELAYED RELEASE ORAL DAILY
Qty: 30 TABLET | Refills: 0 | Status: SHIPPED | OUTPATIENT
Start: 2024-02-29

## 2024-02-29 RX ORDER — FEXOFENADINE HCL 60 MG/1
60 TABLET, FILM COATED ORAL DAILY
Qty: 30 TABLET | Refills: 0 | Status: SHIPPED | OUTPATIENT
Start: 2024-02-29

## 2024-02-29 RX ORDER — CHLORHEXIDINE GLUCONATE ORAL RINSE 1.2 MG/ML
SOLUTION DENTAL
Qty: 473 ML | Refills: 0 | Status: SHIPPED | OUTPATIENT
Start: 2024-02-29

## 2024-04-11 DIAGNOSIS — E11.9 TYPE 2 DIABETES MELLITUS WITHOUT COMPLICATION, WITHOUT LONG-TERM CURRENT USE OF INSULIN (HCC): ICD-10-CM

## 2024-04-11 DIAGNOSIS — E66.01 MORBID OBESITY WITH BMI OF 40.0-44.9, ADULT (HCC): ICD-10-CM

## 2024-04-14 DIAGNOSIS — R05.9 COUGH: ICD-10-CM

## 2024-04-14 DIAGNOSIS — K21.9 GASTROESOPHAGEAL REFLUX DISEASE WITHOUT ESOPHAGITIS: ICD-10-CM

## 2024-04-14 DIAGNOSIS — J30.89 ENVIRONMENTAL AND SEASONAL ALLERGIES: ICD-10-CM

## 2024-04-15 RX ORDER — FEXOFENADINE HCL 60 MG/1
60 TABLET, FILM COATED ORAL DAILY
Qty: 30 TABLET | Refills: 0 | Status: SHIPPED | OUTPATIENT
Start: 2024-04-15

## 2024-04-15 RX ORDER — PANTOPRAZOLE SODIUM 40 MG/1
40 TABLET, DELAYED RELEASE ORAL DAILY
Qty: 30 TABLET | Refills: 0 | Status: SHIPPED | OUTPATIENT
Start: 2024-04-15

## 2024-04-25 DIAGNOSIS — J45.40 MODERATE PERSISTENT ASTHMA WITHOUT COMPLICATION: ICD-10-CM

## 2024-04-25 RX ORDER — MONTELUKAST SODIUM 10 MG/1
10 TABLET ORAL
Qty: 30 TABLET | Refills: 0 | Status: SHIPPED | OUTPATIENT
Start: 2024-04-25

## 2024-05-03 DIAGNOSIS — K21.9 GASTROESOPHAGEAL REFLUX DISEASE WITHOUT ESOPHAGITIS: ICD-10-CM

## 2024-05-03 RX ORDER — PANTOPRAZOLE SODIUM 40 MG/1
40 TABLET, DELAYED RELEASE ORAL DAILY
Qty: 30 TABLET | Refills: 0 | Status: SHIPPED | OUTPATIENT
Start: 2024-05-03

## 2024-05-04 DIAGNOSIS — M54.42 CHRONIC BILATERAL LOW BACK PAIN WITH BILATERAL SCIATICA: ICD-10-CM

## 2024-05-04 DIAGNOSIS — G89.29 CHRONIC BILATERAL LOW BACK PAIN WITH BILATERAL SCIATICA: ICD-10-CM

## 2024-05-04 DIAGNOSIS — M54.41 CHRONIC BILATERAL LOW BACK PAIN WITH BILATERAL SCIATICA: ICD-10-CM

## 2024-05-04 DIAGNOSIS — B35.4 TINEA CORPORIS: ICD-10-CM

## 2024-05-06 RX ORDER — GABAPENTIN 600 MG/1
600 TABLET ORAL 3 TIMES DAILY
Qty: 90 TABLET | Refills: 0 | Status: SHIPPED | OUTPATIENT
Start: 2024-05-06

## 2024-05-06 RX ORDER — PRENATAL VIT 91/IRON/FOLIC/DHA 28-975-200
COMBINATION PACKAGE (EA) ORAL DAILY
Qty: 42 G | Refills: 1 | Status: SHIPPED | OUTPATIENT
Start: 2024-05-06 | End: 2024-05-13

## 2024-05-06 RX ORDER — METHOCARBAMOL 750 MG/1
750 TABLET, FILM COATED ORAL EVERY 8 HOURS SCHEDULED
Qty: 90 TABLET | Refills: 0 | Status: SHIPPED | OUTPATIENT
Start: 2024-05-06

## 2024-05-27 DIAGNOSIS — E11.9 TYPE 2 DIABETES MELLITUS WITHOUT COMPLICATION, WITHOUT LONG-TERM CURRENT USE OF INSULIN (HCC): ICD-10-CM

## 2024-06-12 DIAGNOSIS — K21.9 GASTROESOPHAGEAL REFLUX DISEASE WITHOUT ESOPHAGITIS: ICD-10-CM

## 2024-06-12 RX ORDER — PANTOPRAZOLE SODIUM 40 MG/1
40 TABLET, DELAYED RELEASE ORAL DAILY
Qty: 30 TABLET | Refills: 0 | Status: SHIPPED | OUTPATIENT
Start: 2024-06-12

## 2024-07-06 DIAGNOSIS — E11.9 TYPE 2 DIABETES MELLITUS WITHOUT COMPLICATION, WITHOUT LONG-TERM CURRENT USE OF INSULIN (HCC): ICD-10-CM

## 2024-07-06 DIAGNOSIS — E66.01 MORBID OBESITY WITH BMI OF 40.0-44.9, ADULT (HCC): ICD-10-CM

## 2024-07-09 RX ORDER — SEMAGLUTIDE 0.68 MG/ML
INJECTION, SOLUTION SUBCUTANEOUS
Qty: 3 ML | Refills: 0 | Status: SHIPPED | OUTPATIENT
Start: 2024-07-09

## 2024-08-05 DIAGNOSIS — E66.01 MORBID OBESITY WITH BMI OF 40.0-44.9, ADULT (HCC): ICD-10-CM

## 2024-08-05 DIAGNOSIS — E11.9 TYPE 2 DIABETES MELLITUS WITHOUT COMPLICATION, WITHOUT LONG-TERM CURRENT USE OF INSULIN (HCC): ICD-10-CM

## 2024-08-07 DIAGNOSIS — J45.40 MODERATE PERSISTENT ASTHMA WITHOUT COMPLICATION: ICD-10-CM

## 2024-08-07 RX ORDER — MONTELUKAST SODIUM 10 MG/1
10 TABLET ORAL
Qty: 30 TABLET | Refills: 0 | Status: SHIPPED | OUTPATIENT
Start: 2024-08-07

## 2024-08-31 DIAGNOSIS — E11.9 TYPE 2 DIABETES MELLITUS WITHOUT COMPLICATION, WITHOUT LONG-TERM CURRENT USE OF INSULIN (HCC): ICD-10-CM

## 2024-08-31 DIAGNOSIS — E66.01 MORBID OBESITY WITH BMI OF 40.0-44.9, ADULT (HCC): ICD-10-CM

## 2024-09-04 RX ORDER — SEMAGLUTIDE 0.68 MG/ML
INJECTION, SOLUTION SUBCUTANEOUS
Qty: 3 ML | Refills: 0 | Status: SHIPPED | OUTPATIENT
Start: 2024-09-04

## 2024-09-11 DIAGNOSIS — J45.40 MODERATE PERSISTENT ASTHMA WITHOUT COMPLICATION: ICD-10-CM

## 2024-09-11 RX ORDER — MONTELUKAST SODIUM 10 MG/1
10 TABLET ORAL
Qty: 30 TABLET | Refills: 0 | Status: SHIPPED | OUTPATIENT
Start: 2024-09-11

## 2024-09-19 ENCOUNTER — VBI (OUTPATIENT)
Dept: ADMINISTRATIVE | Facility: OTHER | Age: 49
End: 2024-09-19

## 2024-09-19 NOTE — TELEPHONE ENCOUNTER
09/19/24 3:53 PM     Chart reviewed for Diabetic Eye Exam was/were submitted to the patient's insurance.     Monse Street MA   [facility]

## 2024-09-24 ENCOUNTER — OFFICE VISIT (OUTPATIENT)
Dept: FAMILY MEDICINE CLINIC | Facility: CLINIC | Age: 49
End: 2024-09-24

## 2024-09-24 VITALS
DIASTOLIC BLOOD PRESSURE: 80 MMHG | TEMPERATURE: 97.6 F | SYSTOLIC BLOOD PRESSURE: 110 MMHG | WEIGHT: 237 LBS | OXYGEN SATURATION: 96 % | BODY MASS INDEX: 39.49 KG/M2 | RESPIRATION RATE: 16 BRPM | HEIGHT: 65 IN | HEART RATE: 78 BPM

## 2024-09-24 DIAGNOSIS — G56.03 BILATERAL CARPAL TUNNEL SYNDROME: ICD-10-CM

## 2024-09-24 DIAGNOSIS — J45.40 MODERATE PERSISTENT ASTHMA WITHOUT COMPLICATION: ICD-10-CM

## 2024-09-24 DIAGNOSIS — Z23 ENCOUNTER FOR IMMUNIZATION: ICD-10-CM

## 2024-09-24 DIAGNOSIS — E11.9 TYPE 2 DIABETES MELLITUS WITHOUT COMPLICATION, WITHOUT LONG-TERM CURRENT USE OF INSULIN (HCC): Primary | ICD-10-CM

## 2024-09-24 DIAGNOSIS — E66.812 CLASS 2 OBESITY DUE TO EXCESS CALORIES WITHOUT SERIOUS COMORBIDITY WITH BODY MASS INDEX (BMI) OF 39.0 TO 39.9 IN ADULT: ICD-10-CM

## 2024-09-24 DIAGNOSIS — G47.33 OSA (OBSTRUCTIVE SLEEP APNEA): ICD-10-CM

## 2024-09-24 DIAGNOSIS — E66.09 CLASS 2 OBESITY DUE TO EXCESS CALORIES WITHOUT SERIOUS COMORBIDITY WITH BODY MASS INDEX (BMI) OF 39.0 TO 39.9 IN ADULT: ICD-10-CM

## 2024-09-24 DIAGNOSIS — Z12.31 ENCOUNTER FOR SCREENING MAMMOGRAM FOR BREAST CANCER: ICD-10-CM

## 2024-09-24 LAB — SL AMB POCT HEMOGLOBIN AIC: 8.4 (ref ?–6.5)

## 2024-09-24 PROCEDURE — 90746 HEPB VACCINE 3 DOSE ADULT IM: CPT | Performed by: FAMILY MEDICINE

## 2024-09-24 PROCEDURE — 3079F DIAST BP 80-89 MM HG: CPT | Performed by: FAMILY MEDICINE

## 2024-09-24 PROCEDURE — 90472 IMMUNIZATION ADMIN EACH ADD: CPT | Performed by: FAMILY MEDICINE

## 2024-09-24 PROCEDURE — 90471 IMMUNIZATION ADMIN: CPT | Performed by: FAMILY MEDICINE

## 2024-09-24 PROCEDURE — 90673 RIV3 VACCINE NO PRESERV IM: CPT | Performed by: FAMILY MEDICINE

## 2024-09-24 PROCEDURE — 99214 OFFICE O/P EST MOD 30 MIN: CPT | Performed by: FAMILY MEDICINE

## 2024-09-24 PROCEDURE — 3074F SYST BP LT 130 MM HG: CPT | Performed by: FAMILY MEDICINE

## 2024-09-24 PROCEDURE — 83036 HEMOGLOBIN GLYCOSYLATED A1C: CPT | Performed by: FAMILY MEDICINE

## 2024-09-24 RX ORDER — ROSUVASTATIN CALCIUM 10 MG/1
10 TABLET, COATED ORAL DAILY
Qty: 90 TABLET | Refills: 1 | Status: SHIPPED | OUTPATIENT
Start: 2024-09-24

## 2024-09-24 NOTE — ASSESSMENT & PLAN NOTE
Lab Results   Component Value Date    HGBA1C 8.4 (A) 09/24/2024   Poorly controlled  Hemoglobin A1c goal less than 7  Increase Ozempic from 0.5 mg weekly to 1 mg weekly  Continue metformin  Patient goes to [facility] for [facility] exam  Continue statin  Orders:    POCT hemoglobin A1c    CBC and differential; Future    Comprehensive metabolic panel; Future    Lipid panel; Future    metFORMIN (GLUCOPHAGE) 1000 MG tablet; Take 1 tablet (1,000 mg total) by mouth 2 (two) times a day with meals    rosuvastatin (CRESTOR) 10 MG tablet; Take 1 tablet (10 mg total) by mouth daily    semaglutide, 1 mg/dose, (Ozempic, 1 MG/DOSE,) 4 mg/3 mL injection pen; Inject 0.75 mL (1 mg total) under the skin once a week    Albumin / creatinine urine ratio; Future    Vitamin B12; Future

## 2024-09-24 NOTE — PROGRESS NOTES
Ambulatory Visit  Name: Sydnee Bourgeois      : 1975      MRN: [id number]  Encounter Provider: Js Ahuja MD  Encounter Date: 2024   Encounter department: [facility]    Assessment & Plan  Type 2 diabetes mellitus without complication, without long-term current use of insulin (HCC)    Lab Results   Component Value Date    HGBA1C 8.4 (A) 2024   Poorly controlled  Hemoglobin A1c goal less than 7  Increase Ozempic from 0.5 mg weekly to 1 mg weekly  Continue metformin  Patient goes to [facility] for eye exam  Continue statin  Orders:    POCT hemoglobin A1c    CBC and differential; Future    Comprehensive metabolic panel; Future    Lipid panel; Future    metFORMIN (GLUCOPHAGE) 1000 MG tablet; Take 1 tablet (1,000 mg total) by mouth 2 (two) times a day with meals    rosuvastatin (CRESTOR) 10 MG tablet; Take 1 tablet (10 mg total) by mouth daily    semaglutide, 1 mg/dose, (Ozempic, 1 MG/DOSE,) 4 mg/3 mL injection pen; Inject 0.75 mL (1 mg total) under the skin once a week    Albumin / creatinine urine ratio; Future    Vitamin B12; Future    Bilateral carpal tunnel syndrome  Bilateral carpal tunnel  Recommend nighttime wrist splint  Referral to Ortho  Orders:    Ambulatory Referral to Orthopedic Surgery; Future    Class 2 obesity due to excess calories without serious comorbidity with body mass index (BMI) of 39.0 to 39.9 in adult    Orders:    CBC and differential; Future    Comprehensive metabolic panel; Future    Lipid panel; Future    Moderate persistent asthma without complication  Stable  Continue Advair and albuterol as needed       TERESA (obstructive sleep apnea)  Continue wearing CPAP  Continue working on weight loss  GLP-1 agonist may help facilitate further weight loss       Encounter for screening mammogram for breast cancer    Orders:    Mammo screening bilateral w 3d and cad; Future    Encounter for immunization    Orders:     influenza vaccine, recombinant, PF, 0.5 mL IM (Flublok)    HEPATITIS B VACCINE ADULT IM    BMI Counseling: Body mass index is 39.44 kg/m². The BMI is above normal. Nutrition recommendations include decreasing portion sizes, encouraging healthy choices of fruits and vegetables, decreasing fast food intake, consuming healthier snacks, limiting drinks that contain sugar, moderation in carbohydrate intake and reducing intake of cholesterol. Exercise recommendations include moderate physical activity 150 minutes/week. Rationale for BMI follow-up plan is due to patient being overweight or obese.       History of Present Illness     49-year-old female with a history of TERESA, type 2 diabetes, obesity, and asthma who presents today for follow-up.  Patient states that she continues to experience a lot of pain, numbness and tingling in her fingers bilaterally.  She has a history of carpal tunnel syndrome.  She has tried wearing wrist splints in the past which did not help much.  Patient reports that she has been taking her diabetes medication regularly.  She is following a low carbohydrate diet.          Review of Systems   Constitutional:  Negative for chills, diaphoresis, fatigue and fever.   HENT:  Negative for congestion, postnasal drip and rhinorrhea.    Eyes:  Negative for visual disturbance.   Respiratory:  Negative for cough, shortness of breath and wheezing.    Cardiovascular:  Negative for chest pain, palpitations and leg swelling.   Gastrointestinal:  Negative for abdominal pain, blood in stool, constipation, diarrhea, nausea and vomiting.   Genitourinary:  Negative for dysuria, hematuria and urgency.   Musculoskeletal:  Negative for arthralgias and gait problem.   Skin:  Negative for rash.   Neurological:  Positive for numbness. Negative for syncope, weakness and headaches.   Hematological:  Negative for adenopathy.   Psychiatric/Behavioral:  Negative for agitation.            Objective     /80 (BP Location:  "Right arm, Patient Position: Sitting, Cuff Size: Large)   Pulse 78   Temp 97.6 °F (36.4 °C) (Temporal)   Resp 16   Ht 5' 5\" (1.651 m)   Wt 108 kg (237 lb)   LMP 09/01/2019 (Approximate)   SpO2 96%   BMI 39.44 kg/m²     Physical Exam  Vitals and nursing note reviewed.   Constitutional:       General: She is not in acute distress.     Appearance: Normal appearance. She is well-developed. She is not ill-appearing, toxic-appearing or diaphoretic.   HENT:      Head: Normocephalic and atraumatic.      Right Ear: External ear normal.      Left Ear: External ear normal.      Nose: Nose normal.   Eyes:      Conjunctiva/sclera: Conjunctivae normal.   Cardiovascular:      Rate and Rhythm: Normal rate and regular rhythm.      Pulses: no weak pulses.           Dorsalis pedis pulses are 2+ on the right side and 2+ on the left side.        Posterior tibial pulses are 2+ on the right side and 2+ on the left side.      Heart sounds: No murmur heard.  Pulmonary:      Effort: Pulmonary effort is normal. No respiratory distress.      Breath sounds: Normal breath sounds.   Abdominal:      General: There is no distension.      Palpations: Abdomen is soft. There is no mass.      Tenderness: There is no abdominal tenderness.      Hernia: No hernia is present.   Musculoskeletal:         General: No swelling.      Cervical back: Neck supple.      Right lower leg: No edema.      Left lower leg: No edema.   Feet:      Right foot:      Skin integrity: No ulcer, skin breakdown, erythema, warmth, callus or dry skin.      Left foot:      Skin integrity: No ulcer, skin breakdown, erythema, warmth, callus or dry skin.   Skin:     General: Skin is warm and dry.      Capillary Refill: Capillary refill takes less than 2 seconds.   Neurological:      Mental Status: She is alert.   Psychiatric:         Mood and Affect: Mood normal.     Diabetic Foot Exam    Patient's shoes and socks removed.    Right Foot/Ankle   Right Foot Inspection  Skin Exam: " skin normal and skin intact. No dry skin, no warmth, no callus, no erythema, no maceration, no abnormal color, no pre-ulcer, no ulcer and no callus.     Toe Exam: ROM and strength within normal limits. No swelling, no tenderness, erythema and  no right toe deformity    Sensory   Proprioception: intact  Monofilament testing: intact    Vascular  Capillary refills: < 3 seconds  The right DP pulse is 2+. The right PT pulse is 2+.     Left Foot/Ankle  Left Foot Inspection  Skin Exam: skin normal and skin intact. No dry skin, no warmth, no erythema, no maceration, normal color, no pre-ulcer, no ulcer and no callus.     Toe Exam: ROM and strength within normal limits. No swelling, no tenderness, no erythema and no left toe deformity.     Sensory   Proprioception: intact  Monofilament testing: intact    Vascular  Capillary refills: < 3 seconds  The left DP pulse is 2+. The left PT pulse is 2+.     Assign Risk Category  No deformity present  No loss of protective sensation  No weak pulses  Risk: 0

## 2024-09-24 NOTE — ASSESSMENT & PLAN NOTE
Continue wearing CPAP  Continue working on weight loss  GLP-1 agonist may help facilitate further weight loss

## 2024-09-27 ENCOUNTER — APPOINTMENT (OUTPATIENT)
Dept: LAB | Facility: CLINIC | Age: 49
End: 2024-09-27
Payer: COMMERCIAL

## 2024-09-27 DIAGNOSIS — E11.9 TYPE 2 DIABETES MELLITUS WITHOUT COMPLICATION, WITHOUT LONG-TERM CURRENT USE OF INSULIN (HCC): ICD-10-CM

## 2024-09-27 DIAGNOSIS — E66.812 CLASS 2 OBESITY DUE TO EXCESS CALORIES WITHOUT SERIOUS COMORBIDITY WITH BODY MASS INDEX (BMI) OF 39.0 TO 39.9 IN ADULT: ICD-10-CM

## 2024-09-27 DIAGNOSIS — E66.09 CLASS 2 OBESITY DUE TO EXCESS CALORIES WITHOUT SERIOUS COMORBIDITY WITH BODY MASS INDEX (BMI) OF 39.0 TO 39.9 IN ADULT: ICD-10-CM

## 2024-09-27 LAB
BASOPHILS # BLD AUTO: 0.02 THOUSANDS/ΜL (ref 0–0.1)
BASOPHILS NFR BLD AUTO: 0 % (ref 0–1)
EOSINOPHIL # BLD AUTO: 0.11 THOUSAND/ΜL (ref 0–0.61)
EOSINOPHIL NFR BLD AUTO: 1 % (ref 0–6)
ERYTHROCYTE [DISTWIDTH] IN BLOOD BY AUTOMATED COUNT: 11.7 % (ref 11.6–15.1)
HCT VFR BLD AUTO: 43.7 % (ref 34.8–46.1)
HGB BLD-MCNC: 13.9 G/DL (ref 11.5–15.4)
IMM GRANULOCYTES # BLD AUTO: 0.02 THOUSAND/UL (ref 0–0.2)
IMM GRANULOCYTES NFR BLD AUTO: 0 % (ref 0–2)
LYMPHOCYTES # BLD AUTO: 2.96 THOUSANDS/ΜL (ref 0.6–4.47)
LYMPHOCYTES NFR BLD AUTO: 33 % (ref 14–44)
MCH RBC QN AUTO: 30.8 PG (ref 26.8–34.3)
MCHC RBC AUTO-ENTMCNC: 31.8 G/DL (ref 31.4–37.4)
MCV RBC AUTO: 97 FL (ref 82–98)
MONOCYTES # BLD AUTO: 0.61 THOUSAND/ΜL (ref 0.17–1.22)
MONOCYTES NFR BLD AUTO: 7 % (ref 4–12)
NEUTROPHILS # BLD AUTO: 5.39 THOUSANDS/ΜL (ref 1.85–7.62)
NEUTS SEG NFR BLD AUTO: 59 % (ref 43–75)
NRBC BLD AUTO-RTO: 0 /100 WBCS
PLATELET # BLD AUTO: 195 THOUSANDS/UL (ref 149–390)
PMV BLD AUTO: 11.3 FL (ref 8.9–12.7)
RBC # BLD AUTO: 4.51 MILLION/UL (ref 3.81–5.12)
WBC # BLD AUTO: 9.11 THOUSAND/UL (ref 4.31–10.16)

## 2024-09-27 PROCEDURE — 36415 COLL VENOUS BLD VENIPUNCTURE: CPT

## 2024-09-27 PROCEDURE — 82043 UR ALBUMIN QUANTITATIVE: CPT

## 2024-09-27 PROCEDURE — 82570 ASSAY OF URINE CREATININE: CPT

## 2024-09-27 PROCEDURE — 85025 COMPLETE CBC W/AUTO DIFF WBC: CPT

## 2024-09-27 PROCEDURE — 82607 VITAMIN B-12: CPT

## 2024-09-27 PROCEDURE — 80061 LIPID PANEL: CPT

## 2024-09-27 PROCEDURE — 80053 COMPREHEN METABOLIC PANEL: CPT

## 2024-09-28 LAB
ALBUMIN SERPL BCG-MCNC: 3.9 G/DL (ref 3.5–5)
ALP SERPL-CCNC: 102 U/L (ref 34–104)
ALT SERPL W P-5'-P-CCNC: 31 U/L (ref 7–52)
ANION GAP SERPL CALCULATED.3IONS-SCNC: 6 MMOL/L (ref 4–13)
AST SERPL W P-5'-P-CCNC: 30 U/L (ref 13–39)
BILIRUB SERPL-MCNC: 0.39 MG/DL (ref 0.2–1)
BUN SERPL-MCNC: 14 MG/DL (ref 5–25)
CALCIUM SERPL-MCNC: 9.7 MG/DL (ref 8.4–10.2)
CHLORIDE SERPL-SCNC: 99 MMOL/L (ref 96–108)
CHOLEST SERPL-MCNC: 189 MG/DL
CO2 SERPL-SCNC: 33 MMOL/L (ref 21–32)
CREAT SERPL-MCNC: 0.81 MG/DL (ref 0.6–1.3)
CREAT UR-MCNC: 123.4 MG/DL
GFR SERPL CREATININE-BSD FRML MDRD: 84 ML/MIN/1.73SQ M
GLUCOSE P FAST SERPL-MCNC: 161 MG/DL (ref 65–99)
HDLC SERPL-MCNC: 50 MG/DL
LDLC SERPL CALC-MCNC: 122 MG/DL (ref 0–100)
MICROALBUMIN UR-MCNC: 13.6 MG/L
MICROALBUMIN/CREAT 24H UR: 11 MG/G CREATININE (ref 0–30)
NONHDLC SERPL-MCNC: 139 MG/DL
POTASSIUM SERPL-SCNC: 4.7 MMOL/L (ref 3.5–5.3)
PROT SERPL-MCNC: 7.9 G/DL (ref 6.4–8.4)
SODIUM SERPL-SCNC: 138 MMOL/L (ref 135–147)
TRIGL SERPL-MCNC: 83 MG/DL
VIT B12 SERPL-MCNC: 437 PG/ML (ref 180–914)

## 2024-10-06 DIAGNOSIS — E55.9 VITAMIN D DEFICIENCY: ICD-10-CM

## 2024-10-07 RX ORDER — CHOLECALCIFEROL (VITAMIN D3) 50 MCG
2000 TABLET ORAL DAILY
Qty: 90 TABLET | Refills: 0 | Status: SHIPPED | OUTPATIENT
Start: 2024-10-07

## 2024-10-08 ENCOUNTER — OFFICE VISIT (OUTPATIENT)
Dept: OBGYN CLINIC | Facility: CLINIC | Age: 49
End: 2024-10-08
Payer: COMMERCIAL

## 2024-10-08 VITALS
SYSTOLIC BLOOD PRESSURE: 120 MMHG | HEIGHT: 65 IN | WEIGHT: 237 LBS | DIASTOLIC BLOOD PRESSURE: 62 MMHG | BODY MASS INDEX: 39.49 KG/M2

## 2024-10-08 DIAGNOSIS — G56.03 BILATERAL CARPAL TUNNEL SYNDROME: ICD-10-CM

## 2024-10-08 PROCEDURE — 99213 OFFICE O/P EST LOW 20 MIN: CPT | Performed by: SURGERY

## 2024-10-08 NOTE — PROGRESS NOTES
ORTHOPAEDIC HAND, WRIST, AND ELBOW OFFICE  VISIT       ASSESSMENT/PLAN:      49 y.o. year old female who presents with Bilateral hand numbness    Physical exam was performed  Recommend repeat EMG. Pt with some provocative symptoms at the wrist and elbow level on the left.  Last EMG from 2016 was normal  Can restart bracing at night   NSAIDs as needed    The patient verbalized understanding of exam findings and treatment plan. We engaged in the shared decision-making process and treatment options were discussed at length with the patient. Surgical and conservative management discussed today along with risks and benefits.    Diagnoses and all orders for this visit:    Bilateral carpal tunnel syndrome  -     Ambulatory Referral to Orthopedic Surgery  -     EMG 2 Limb Upper Extremity; Future        Follow Up:  Return for After EMG.    To Do Next Visit:  Re-evaluation of current issue        ____________________________________________________________________________________________________________________________________________      CHIEF COMPLAINT:  Chief Complaint   Patient presents with    Left Wrist - Numbness     B/l tingling and numb change in temp.     Right Wrist - Numbness       SUBJECTIVE:  Sydnee Bourgeois is a 49 y.o. year old  female who presents for evaluation of bilateral hand numbness     Patient states that she has ramin having numbness tingling in bilateral hands since 2016. All her fingers were getting numb but lately it is just her thumbs. Numbness is constant and she also feels her thumbs are cold as well. She has braced in the past with no reduction/resolution of her symptoms  She would like to discuss surgery    I have personally reviewed all the relevant PMH, PSH, SH, FH, Medications and allergies      PAST MEDICAL HISTORY:  Past Medical History:   Diagnosis Date    Anemia     Anxiety     Arthritis     hands, knee    Asthma     uses inhalers for the same. No recnt flairups    Back pain      Carpal tunnel syndrome     Bilateral    Constipation     Depression     Diabetes mellitus (HCC)     Environmental allergies     GERD (gastroesophageal reflux disease)     H/O cardiac murmur     Insomnia     Kidney stone     Migraine     Psychiatric disorder     anxiety    Pyelonephritis     Sleep apnea, obstructive     Wears glasses        PAST SURGICAL HISTORY:  Past Surgical History:   Procedure Laterality Date    BACK SURGERY      States she isn't sure what she had done-possibly something to do with a lump or muscle    CHOLECYSTECTOMY      CYSTOSCOPY      onset: 6/1/16, resolved: 6/1/16    ESOPHAGOGASTRODUODENOSCOPY      CO LIG&DIV LONG SAPH VEIN SAPHFEM JUNCT/INTERRUPJ Left 12/22/2016    Procedure: LIGATION/STRIPPING VEIN, LIGATION OF ANTERIOR TRIBUTARY GREATER SAPHENOUS VEIN BRANCH;  Surgeon: Agatha Kaplan DO;  Location: [facility];  Service: Vascular    CO STAB PHLEBT VARICOSE VEINS 1 XTR 10-20 STAB INCS Left 12/22/2016    Procedure: MULTIPLE STAB PHLEBECTOMIES;  Surgeon: Agatha Kaplan DO;  Location: [facility];  Service: Vascular    CO STAB PHLEBT VARICOSE VEINS 1 XTR > 20 INCS Right 11/5/2021    Procedure: MULTIPLE STAB PHLEBECTOMIES;  Surgeon: Agatha Kaplan DO;  [facility];  Service: Vascular    TUBAL LIGATION      VARICOSE VEIN SURGERY Left     Left leg    VEIN LIGATION Right 6/5/2017    Procedure: LIGATION GREATER SAPHENOUS VEIN TRIBUTARY WITH STAB PHLEBECTOMIES ;  Surgeon: Agatha Kaplan DO;  Location: [facility];  Service:        FAMILY HISTORY:  Family History   Problem Relation Age of Onset    Asthma Mother     Coronary artery disease Mother     Depression Mother     Diabetes Mother     Hypertension Mother     Varicose Veins Mother     Asthma Father     Hypertension Father     Lung cancer Father     Diabetes Father     Breast cancer Sister 45    No Known Problems Daughter     No Known Problems Maternal Grandmother     Colon cancer Maternal Grandfather     No Known  Problems Paternal Grandmother     No Known Problems Paternal Grandfather     Breast cancer Maternal Aunt 50    Breast cancer Maternal Aunt 60    Ovarian cancer Maternal Aunt        SOCIAL HISTORY:  Social History     Tobacco Use    Smoking status: Former     Current packs/day: 0.00     Average packs/day: 2.0 packs/day for 24.6 years (49.2 ttl pk-yrs)     Types: Cigarettes     Start date: 1995     Quit date: 2019     Years since quittin.1    Smokeless tobacco: Never   Vaping Use    Vaping status: Never Used   Substance Use Topics    Alcohol use: Yes     Comment: occasionally    Drug use: Never       MEDICATIONS:    Current Outpatient Medications:     acetaminophen-codeine (TYLENOL with CODEINE #3) 300-30 MG per tablet, Take 1 tablet by mouth every 6 (six) hours as needed, Disp: , Rfl:     albuterol (2.5 mg/3 mL) 0.083 % nebulizer solution, Take 3 mL (2.5 mg total) by nebulization every 6 (six) hours as needed for wheezing or shortness of breath, Disp: 30 mL, Rfl: 0    albuterol (PROVENTIL HFA,VENTOLIN HFA) 90 mcg/act inhaler, INHALE 1 PUFF BY MOUTH EVERY 6 HOURS AS NEEDED FOR WHEEZE, Disp: 18 g, Rfl: 1    azelastine (ASTELIN) 0.1 % nasal spray, 1 spray into each nostril 2 (two) times a day Use in each nostril as directed, Disp: 1 Bottle, Rfl: 1    benzonatate (TESSALON) 200 MG capsule, Take 1 capsule (200 mg total) by mouth 3 (three) times a day as needed for cough, Disp: 20 capsule, Rfl: 3    chlorhexidine (PERIDEX) 0.12 % solution, APPLY 15 ML TO THE MOUTH OR THROAT TWICE A DAY, Disp: 473 mL, Rfl: 0    Cholecalciferol (Vitamin D3) 50 MCG (2000 UT) TABS, TAKE 1 TABLET BY MOUTH EVERY DAY, Disp: 90 tablet, Rfl: 0    clonazePAM (KlonoPIN) 0.5 mg tablet, Take 0.5 mg by mouth 2 (two) times a day, Disp: , Rfl:     clonazePAM (KlonoPIN) 1 mg tablet, Take 1 tablet (1 mg total) by mouth 2 (two) times a day, Disp: 45 tablet, Rfl: 0    escitalopram (LEXAPRO) 20 mg tablet, TAKE 1 TABLET BY MOUTH EVERY DAY IN THE  MORNING, Disp: 90 tablet, Rfl: 1    fexofenadine (ALLEGRA) 60 MG tablet, TAKE 1 TABLET BY MOUTH EVERY DAY, Disp: 30 tablet, Rfl: 0    fluticasone (FLONASE) 50 mcg/act nasal spray, 2 sprays into each nostril daily, Disp: 16 mL, Rfl: 1    fluticasone-salmeterol (Advair HFA) 230-21 MCG/ACT inhaler, Inhale 2 puffs 2 (two) times a day Rinse mouth after use., Disp: 12 g, Rfl: 2    gabapentin (NEURONTIN) 600 MG tablet, TAKE 1 TABLET BY MOUTH THREE TIMES A DAY, Disp: 90 tablet, Rfl: 0    ibuprofen (MOTRIN) 400 mg tablet, Take 1 tablet (400 mg total) by mouth every 8 (eight) hours as needed for mild pain, Disp: 30 tablet, Rfl: 0    lidocaine (Lidoderm) 5 %, Apply 1 patch topically over 12 hours daily Remove & Discard patch within 12 hours or as directed by MD, Disp: 21 patch, Rfl: 0    Melatonin 5 MG TABS, Take 1 tablet (5 mg total) by mouth daily at bedtime, Disp: 30 tablet, Rfl: 0    metFORMIN (GLUCOPHAGE) 1000 MG tablet, Take 1 tablet (1,000 mg total) by mouth 2 (two) times a day with meals, Disp: 180 tablet, Rfl: 1    methocarbamol (ROBAXIN) 750 mg tablet, TAKE 1 TABLET (750 MG TOTAL) BY MOUTH EVERY 8 (EIGHT) HOURS, Disp: 90 tablet, Rfl: 0    Misc. Devices (PULSE OXIMETER) MISC, by Does not apply route daily, Disp: 1 each, Rfl: 0    montelukast (SINGULAIR) 10 mg tablet, TAKE 1 TABLET BY MOUTH EVERYDAY AT BEDTIME, Disp: 30 tablet, Rfl: 0    pantoprazole (PROTONIX) 40 mg tablet, TAKE 1 TABLET BY MOUTH EVERY DAY, Disp: 30 tablet, Rfl: 0    rosuvastatin (CRESTOR) 10 MG tablet, Take 1 tablet (10 mg total) by mouth daily, Disp: 90 tablet, Rfl: 1    semaglutide, 1 mg/dose, (Ozempic, 1 MG/DOSE,) 4 mg/3 mL injection pen, Inject 0.75 mL (1 mg total) under the skin once a week, Disp: 9 mL, Rfl: 1    traZODone (DESYREL) 100 mg tablet, Take 100 mg by mouth in the morning, Disp: , Rfl:     bisacodyl (DULCOLAX) 5 mg EC tablet, Take 2 tablets (10 mg total) by mouth once for 1 dose, Disp: 2 tablet, Rfl: 0    bisacodyl (DULCOLAX) 5 mg EC  tablet, Take the Dulcolax tabs as instructed on colon prep., Disp: 2 tablet, Rfl: 0    buPROPion (WELLBUTRIN SR) 200 MG 12 hr tablet, TAKE 1 TABLET (200 MG TOTAL) BY MOUTH 2 TIMES A DAY. (Patient not taking: Reported on 1/29/2024), Disp: 60 tablet, Rfl: 0    polyethylene glycol (MiraLax) 17 GM/SCOOP powder, Take 238 g by mouth once for 1 dose Take 238 g my mouth. Use as directed, Disp: 238 g, Rfl: 0    terbinafine (LamISIL) 1 % cream, APPLY TOPICALLY IN THE MORNING FOR 7 DAYS, Disp: 42 g, Rfl: 1    ALLERGIES:  No Known Allergies        REVIEW OF SYSTEMS:  Review of Systems   Constitutional:  Negative for chills and fever.   HENT:  Negative for ear pain and sore throat.    Eyes:  Negative for pain and visual disturbance.   Respiratory:  Negative for cough and shortness of breath.    Cardiovascular:  Negative for chest pain and palpitations.   Gastrointestinal:  Negative for abdominal pain and vomiting.   Genitourinary:  Negative for dysuria and hematuria.   Musculoskeletal:  Negative for arthralgias and back pain.   Skin:  Negative for color change and rash.   Neurological:  Positive for numbness. Negative for seizures and syncope.   All other systems reviewed and are negative.      VITALS:  Vitals:    10/08/24 1037   BP: 120/62       LABS:  HgA1c:   Lab Results   Component Value Date    HGBA1C 8.4 (A) 09/24/2024     BMP:   Lab Results   Component Value Date    GLUCOSE 102 12/16/2015    CALCIUM 9.7 09/27/2024     04/27/2018    K 4.7 09/27/2024    CO2 33 (H) 09/27/2024    CL 99 09/27/2024    BUN 14 09/27/2024    CREATININE 0.81 09/27/2024       _____________________________________________________  PHYSICAL EXAMINATION:  General: well developed and well nourished, alert, oriented times 3, and appears comfortable  Psychiatric: Normal  HEENT: Normocephalic, Atraumatic Trachea Midline, No torticollis  Pulmonary: No audible wheezing or respiratory distress   Abdomen/GI: Non tender, non distended   Cardiovascular: No  pitting edema, 2+ radial pulse   Skin: No masses, erythema, lacerations, fluctation, ulcerations  Neurovascular: Sensation Intact to the Median, Ulnar, Radial Nerve, Motor Intact to the Median, Ulnar, Radial Nerve, and Pulses Intact  Musculoskeletal: Normal, except as noted in detailed exam and in HPI.      MUSCULOSKELETAL EXAMINATION:  Right hand:  SILT  Composite fist    Positive Durkan's  Intrinsic twitching noted with elbow flexion compression test    Left hand:  SILT  Composite fist      Positive Durkan's  Positive elbow flexion compression test    ___________________________________________________  STUDIES REVIEWED:  2016 EMG reviewed    PROCEDURES PERFORMED:  Procedures  No Procedures performed today    _____________________________________________________      Scribe Attestation      I,:  Jose Shannon am acting as a scribe while in the presence of the attending physician.:       I,:  King Cid MD personally performed the services described in this documentation    as scribed in my presence.:

## 2024-10-18 DIAGNOSIS — M54.41 CHRONIC BILATERAL LOW BACK PAIN WITH BILATERAL SCIATICA: ICD-10-CM

## 2024-10-18 DIAGNOSIS — M54.42 CHRONIC BILATERAL LOW BACK PAIN WITH BILATERAL SCIATICA: ICD-10-CM

## 2024-10-18 DIAGNOSIS — M51.26 LUMBAR DISC HERNIATION: ICD-10-CM

## 2024-10-18 DIAGNOSIS — G89.29 CHRONIC BILATERAL LOW BACK PAIN WITH BILATERAL SCIATICA: ICD-10-CM

## 2024-10-18 DIAGNOSIS — G47.00 INSOMNIA, UNSPECIFIED TYPE: ICD-10-CM

## 2024-10-21 RX ORDER — METHOCARBAMOL 750 MG/1
750 TABLET, FILM COATED ORAL EVERY 8 HOURS SCHEDULED
Qty: 90 TABLET | Refills: 0 | Status: SHIPPED | OUTPATIENT
Start: 2024-10-21

## 2024-10-21 RX ORDER — GABAPENTIN 600 MG/1
600 TABLET ORAL 3 TIMES DAILY
Qty: 90 TABLET | Refills: 0 | Status: SHIPPED | OUTPATIENT
Start: 2024-10-21

## 2024-10-21 RX ORDER — IBUPROFEN 400 MG/1
400 TABLET, FILM COATED ORAL EVERY 8 HOURS PRN
Qty: 30 TABLET | Refills: 0 | Status: SHIPPED | OUTPATIENT
Start: 2024-10-21

## 2024-11-05 DIAGNOSIS — J30.89 ENVIRONMENTAL AND SEASONAL ALLERGIES: ICD-10-CM

## 2024-11-05 DIAGNOSIS — J45.40 MODERATE PERSISTENT ASTHMA WITHOUT COMPLICATION: ICD-10-CM

## 2024-11-05 DIAGNOSIS — R05.9 COUGH: ICD-10-CM

## 2024-11-05 DIAGNOSIS — G47.00 INSOMNIA, UNSPECIFIED TYPE: ICD-10-CM

## 2024-11-05 RX ORDER — ALBUTEROL SULFATE 0.83 MG/ML
2.5 SOLUTION RESPIRATORY (INHALATION) EVERY 6 HOURS PRN
Qty: 30 ML | Refills: 0 | Status: SHIPPED | OUTPATIENT
Start: 2024-11-05

## 2024-11-05 RX ORDER — FEXOFENADINE HCL 60 MG/1
60 TABLET, FILM COATED ORAL DAILY
Qty: 30 TABLET | Refills: 0 | Status: SHIPPED | OUTPATIENT
Start: 2024-11-05

## 2024-11-20 DIAGNOSIS — M54.41 CHRONIC BILATERAL LOW BACK PAIN WITH BILATERAL SCIATICA: ICD-10-CM

## 2024-11-20 DIAGNOSIS — M54.42 CHRONIC BILATERAL LOW BACK PAIN WITH BILATERAL SCIATICA: ICD-10-CM

## 2024-11-20 DIAGNOSIS — G89.29 CHRONIC BILATERAL LOW BACK PAIN WITH BILATERAL SCIATICA: ICD-10-CM

## 2024-11-21 RX ORDER — METHOCARBAMOL 750 MG/1
750 TABLET, FILM COATED ORAL EVERY 8 HOURS SCHEDULED
Qty: 90 TABLET | Refills: 0 | Status: SHIPPED | OUTPATIENT
Start: 2024-11-21

## 2024-12-05 ENCOUNTER — VBI (OUTPATIENT)
Dept: ADMINISTRATIVE | Facility: OTHER | Age: 49
End: 2024-12-05

## 2024-12-05 NOTE — TELEPHONE ENCOUNTER
12/05/24 1:12 PM     Chart reviewed for Hemoglobin A1c was/were submitted to the patient's insurance.     Monse Street MA   [facility]

## 2025-01-02 DIAGNOSIS — G89.29 CHRONIC BILATERAL LOW BACK PAIN WITH BILATERAL SCIATICA: ICD-10-CM

## 2025-01-02 DIAGNOSIS — M54.42 CHRONIC BILATERAL LOW BACK PAIN WITH BILATERAL SCIATICA: ICD-10-CM

## 2025-01-02 DIAGNOSIS — M54.41 CHRONIC BILATERAL LOW BACK PAIN WITH BILATERAL SCIATICA: ICD-10-CM

## 2025-01-06 RX ORDER — METHOCARBAMOL 750 MG/1
750 TABLET, FILM COATED ORAL EVERY 8 HOURS PRN
Qty: 90 TABLET | Refills: 0 | Status: SHIPPED | OUTPATIENT
Start: 2025-01-06

## 2025-01-22 ENCOUNTER — OFFICE VISIT (OUTPATIENT)
Dept: FAMILY MEDICINE CLINIC | Facility: CLINIC | Age: 50
End: 2025-01-22

## 2025-01-22 VITALS
BODY MASS INDEX: 38.82 KG/M2 | HEIGHT: 65 IN | WEIGHT: 233 LBS | OXYGEN SATURATION: 96 % | TEMPERATURE: 97.6 F | HEART RATE: 74 BPM | SYSTOLIC BLOOD PRESSURE: 132 MMHG | DIASTOLIC BLOOD PRESSURE: 80 MMHG | RESPIRATION RATE: 16 BRPM

## 2025-01-22 DIAGNOSIS — G89.29 CHRONIC BILATERAL LOW BACK PAIN WITH BILATERAL SCIATICA: ICD-10-CM

## 2025-01-22 DIAGNOSIS — E11.9 TYPE 2 DIABETES MELLITUS WITHOUT COMPLICATION, WITHOUT LONG-TERM CURRENT USE OF INSULIN (HCC): Primary | ICD-10-CM

## 2025-01-22 DIAGNOSIS — F17.211 NICOTINE DEPENDENCE, CIGARETTES, IN REMISSION: ICD-10-CM

## 2025-01-22 DIAGNOSIS — K21.9 GASTROESOPHAGEAL REFLUX DISEASE WITHOUT ESOPHAGITIS: ICD-10-CM

## 2025-01-22 DIAGNOSIS — Z12.31 ENCOUNTER FOR SCREENING MAMMOGRAM FOR BREAST CANCER: ICD-10-CM

## 2025-01-22 DIAGNOSIS — Z12.4 SCREENING FOR CERVICAL CANCER: ICD-10-CM

## 2025-01-22 DIAGNOSIS — F41.9 ANXIETY: ICD-10-CM

## 2025-01-22 DIAGNOSIS — Z87.891 FORMER CIGARETTE SMOKER: ICD-10-CM

## 2025-01-22 DIAGNOSIS — G47.33 OSA (OBSTRUCTIVE SLEEP APNEA): ICD-10-CM

## 2025-01-22 DIAGNOSIS — Z00.00 ANNUAL PHYSICAL EXAM: ICD-10-CM

## 2025-01-22 DIAGNOSIS — J45.40 MODERATE PERSISTENT ASTHMA WITHOUT COMPLICATION: ICD-10-CM

## 2025-01-22 DIAGNOSIS — M54.42 CHRONIC BILATERAL LOW BACK PAIN WITH BILATERAL SCIATICA: ICD-10-CM

## 2025-01-22 DIAGNOSIS — Z23 ENCOUNTER FOR IMMUNIZATION: ICD-10-CM

## 2025-01-22 DIAGNOSIS — Z12.2 SCREENING FOR LUNG CANCER: ICD-10-CM

## 2025-01-22 DIAGNOSIS — M54.41 CHRONIC BILATERAL LOW BACK PAIN WITH BILATERAL SCIATICA: ICD-10-CM

## 2025-01-22 PROBLEM — B37.0 THRUSH: Status: RESOLVED | Noted: 2023-11-16 | Resolved: 2025-01-22

## 2025-01-22 LAB
DME PARACHUTE DELIVERY DATE REQUESTED: NORMAL
DME PARACHUTE ITEM DESCRIPTION: NORMAL
DME PARACHUTE ORDER STATUS: NORMAL
DME PARACHUTE SUPPLIER NAME: NORMAL
DME PARACHUTE SUPPLIER PHONE: NORMAL
SL AMB POCT HEMOGLOBIN AIC: 6.1 (ref ?–6.5)

## 2025-01-22 PROCEDURE — 99396 PREV VISIT EST AGE 40-64: CPT | Performed by: FAMILY MEDICINE

## 2025-01-22 PROCEDURE — 99214 OFFICE O/P EST MOD 30 MIN: CPT | Performed by: FAMILY MEDICINE

## 2025-01-22 PROCEDURE — 90750 HZV VACC RECOMBINANT IM: CPT

## 2025-01-22 PROCEDURE — 3075F SYST BP GE 130 - 139MM HG: CPT | Performed by: FAMILY MEDICINE

## 2025-01-22 PROCEDURE — 3079F DIAST BP 80-89 MM HG: CPT | Performed by: FAMILY MEDICINE

## 2025-01-22 PROCEDURE — 83036 HEMOGLOBIN GLYCOSYLATED A1C: CPT | Performed by: FAMILY MEDICINE

## 2025-01-22 PROCEDURE — 90471 IMMUNIZATION ADMIN: CPT

## 2025-01-22 RX ORDER — FLUTICASONE PROPIONATE AND SALMETEROL XINAFOATE 230; 21 UG/1; UG/1
2 AEROSOL, METERED RESPIRATORY (INHALATION) 2 TIMES DAILY
Qty: 12 G | Refills: 2 | Status: SHIPPED | OUTPATIENT
Start: 2025-01-22 | End: 2025-01-22

## 2025-01-22 RX ORDER — MONTELUKAST SODIUM 10 MG/1
10 TABLET ORAL
Qty: 30 TABLET | Refills: 3 | Status: SHIPPED | OUTPATIENT
Start: 2025-01-22

## 2025-01-22 RX ORDER — ALBUTEROL SULFATE 0.83 MG/ML
2.5 SOLUTION RESPIRATORY (INHALATION) EVERY 6 HOURS PRN
Qty: 30 ML | Refills: 2 | Status: SHIPPED | OUTPATIENT
Start: 2025-01-22

## 2025-01-22 RX ORDER — GABAPENTIN 600 MG/1
600 TABLET ORAL 3 TIMES DAILY
Qty: 90 TABLET | Refills: 0 | Status: SHIPPED | OUTPATIENT
Start: 2025-01-22

## 2025-01-22 RX ORDER — MOMETASONE FUROATE AND FORMOTEROL FUMARATE DIHYDRATE 200; 5 UG/1; UG/1
2 AEROSOL RESPIRATORY (INHALATION) 2 TIMES DAILY
Qty: 13 G | Refills: 3 | Status: SHIPPED | OUTPATIENT
Start: 2025-01-22

## 2025-01-22 RX ORDER — ROSUVASTATIN CALCIUM 10 MG/1
10 TABLET, COATED ORAL DAILY
Qty: 90 TABLET | Refills: 1 | Status: SHIPPED | OUTPATIENT
Start: 2025-01-22

## 2025-01-22 RX ORDER — ALBUTEROL SULFATE 90 UG/1
2 INHALANT RESPIRATORY (INHALATION) EVERY 6 HOURS PRN
Qty: 18 G | Refills: 3 | Status: SHIPPED | OUTPATIENT
Start: 2025-01-22

## 2025-01-22 RX ORDER — PANTOPRAZOLE SODIUM 40 MG/1
40 TABLET, DELAYED RELEASE ORAL DAILY
Qty: 30 TABLET | Refills: 3 | Status: SHIPPED | OUTPATIENT
Start: 2025-01-22

## 2025-01-22 RX ORDER — FLUTICASONE PROPIONATE 50 MCG
2 SPRAY, SUSPENSION (ML) NASAL DAILY
Qty: 16 ML | Refills: 1 | Status: SHIPPED | OUTPATIENT
Start: 2025-01-22

## 2025-01-22 NOTE — ASSESSMENT & PLAN NOTE
Stable  Antireflux diet discussed with patient  Continue PPI  Orders:    pantoprazole (PROTONIX) 40 mg tablet; Take 1 tablet (40 mg total) by mouth daily

## 2025-01-22 NOTE — ASSESSMENT & PLAN NOTE
Stable  Continue albuterol as needed  Continue Advair daily  Orders:    albuterol (2.5 mg/3 mL) 0.083 % nebulizer solution; Take 3 mL (2.5 mg total) by nebulization every 6 (six) hours as needed for wheezing or shortness of breath    albuterol (PROVENTIL HFA,VENTOLIN HFA) 90 mcg/act inhaler; Inhale 2 puffs every 6 (six) hours as needed for wheezing or shortness of breath    fluticasone-salmeterol (Advair HFA) 230-21 MCG/ACT inhaler; Inhale 2 puffs 2 (two) times a day Rinse mouth after use.    montelukast (SINGULAIR) 10 mg tablet; Take 1 tablet (10 mg total) by mouth daily at bedtime    fluticasone (FLONASE) 50 mcg/act nasal spray; 2 sprays into each nostril daily

## 2025-01-22 NOTE — ASSESSMENT & PLAN NOTE
Chronic low back pain  No red flag  Continue gabapentin  Orders:    gabapentin (NEURONTIN) 600 MG tablet; Take 1 tablet (600 mg total) by mouth 3 (three) times a day
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
## 2025-01-22 NOTE — PATIENT INSTRUCTIONS
"-Schedule mammogram and CT scan of lungs ([phone])  -Get blood work before next appointment  Patient Education     Routine physical for adults   The Basics   Written by the doctors and editors at [facility]   What is a physical? -- A physical is a routine visit, or \"check-up,\" with your doctor. You might also hear it called a \"wellness visit\" or \"preventive visit.\"  During each visit, the doctor will:   Ask about your physical and mental health   Ask about your habits, behaviors, and lifestyle   Do an exam   Give you vaccines if needed   Talk to you about any medicines you take   Give advice about your health   Answer your questions  Getting regular check-ups is an important part of taking care of your health. It can help your doctor find and treat any problems you have. But it's also important for preventing health problems.  A routine physical is different from a \"sick visit.\" A sick visit is when you see a doctor because of a health concern or problem. Since physicals are scheduled ahead of time, you can think about what you want to ask the doctor.  How often should I get a physical? -- It depends on your age and health. In general, for people age 21 years and older:   If you are younger than 50 years, you might be able to get a physical every 3 years.   If you are 50 years or older, your doctor might recommend a physical every year.  If you have an ongoing health condition, like diabetes or high blood pressure, your doctor will probably want to see you more often.  What happens during a physical? -- In general, each visit will include:   Physical exam - The doctor or nurse will check your height, weight, heart rate, and blood pressure. They will also look at your eyes and ears. They will ask about how you are feeling and whether you have any symptoms that bother you.   Medicines - It's a good idea to bring a list of all the medicines you take to each doctor visit. Your doctor will talk to you about your " "medicines and answer any questions. Tell them if you are having any side effects that bother you. You should also tell them if you are having trouble paying for any of your medicines.   Habits and behaviors - This includes:   Your diet   Your exercise habits   Whether you smoke, drink alcohol, or use drugs   Whether you are sexually active   Whether you feel safe at home  Your doctor will talk to you about things you can do to improve your health and lower your risk of health problems. They will also offer help and support. For example, if you want to quit smoking, they can give you advice and might prescribe medicines. If you want to improve your diet or get more physical activity, they can help you with this, too.   Lab tests, if needed - The tests you get will depend on your age and situation. For example, your doctor might want to check your:   Cholesterol   Blood sugar   Iron level   Vaccines - The recommended vaccines will depend on your age, health, and what vaccines you already had. Vaccines are very important because they can prevent certain serious or deadly infections.   Discussion of screening - \"Screening\" means checking for diseases or other health problems before they cause symptoms. Your doctor can recommend screening based on your age, risk, and preferences. This might include tests to check for:   Cancer, such as breast, prostate, cervical, ovarian, colorectal, prostate, lung, or skin cancer   Sexually transmitted infections, such as chlamydia and gonorrhea   Mental health conditions like depression and anxiety  Your doctor will talk to you about the different types of screening tests. They can help you decide which screenings to have. They can also explain what the results might mean.   Answering questions - The physical is a good time to ask the doctor or nurse questions about your health. If needed, they can refer you to other doctors or specialists, too.  Adults older than 65 years often need " other care, too. As you get older, your doctor will talk to you about:   How to prevent falling at home   Hearing or vision tests   Memory testing   How to take your medicines safely   Making sure that you have the help and support you need at home  All topics are updated as new evidence becomes available and our peer review process is complete.  This topic retrieved from [employer] on: May 02, 2024.  Topic 397749 Version 1.0  Release: 32.4.3 - C32.122  © 2024 UpToDate, Inc. and/or its affiliates. All rights reserved.  Consumer Information Use and Disclaimer   Disclaimer: This generalized information is a limited summary of diagnosis, treatment, and/or medication information. It is not meant to be comprehensive and should be used as a tool to help the user understand and/or assess potential diagnostic and treatment options. It does NOT include all information about conditions, treatments, medications, side effects, or risks that may apply to a specific patient. It is not intended to be medical advice or a substitute for the medical advice, diagnosis, or treatment of a health care provider based on the health care provider's examination and assessment of a patient's specific and unique circumstances. Patients must speak with a health care provider for complete information about their health, medical questions, and treatment options, including any risks or benefits regarding use of medications. This information does not endorse any treatments or medications as safe, effective, or approved for treating a specific patient. UpToDate, Inc. and its affiliates disclaim any warranty or liability relating to this information or the use thereof.The use of this information is governed by the Terms of Use, available at https://www.wolterskluwer.com/en/know/clinical-effectiveness-terms. 2024© UpToDate, Inc. and its affiliates and/or licensors. All rights reserved.  Copyright   © 2024 UpToDate, Inc. and/or its affiliates. All rights  reserved.
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
## 2025-01-22 NOTE — ASSESSMENT & PLAN NOTE
Lab Results   Component Value Date    HGBA1C 6.1 01/22/2025     Well-controlled  Continue Ozempic 1 mg weekly and metformin 1000 mg twice daily  Continue Crestor  Up-to-date with eye evaluation  Orders:    POCT hemoglobin A1c    metFORMIN (GLUCOPHAGE) 1000 MG tablet; Take 1 tablet (1,000 mg total) by mouth 2 (two) times a day with meals    semaglutide, 1 mg/dose, (Ozempic, 1 MG/DOSE,) 4 mg/3 mL injection pen; Inject 0.75 mL (1 mg total) under the skin once a week    rosuvastatin (CRESTOR) 10 MG tablet; Take 1 tablet (10 mg total) by mouth daily    Comprehensive metabolic panel; Future    CBC and Platelet; Future

## 2025-01-25 DIAGNOSIS — G89.29 CHRONIC BILATERAL LOW BACK PAIN WITH BILATERAL SCIATICA: ICD-10-CM

## 2025-01-25 DIAGNOSIS — M54.42 CHRONIC BILATERAL LOW BACK PAIN WITH BILATERAL SCIATICA: ICD-10-CM

## 2025-01-25 DIAGNOSIS — M51.26 LUMBAR DISC HERNIATION: ICD-10-CM

## 2025-01-25 DIAGNOSIS — M54.41 CHRONIC BILATERAL LOW BACK PAIN WITH BILATERAL SCIATICA: ICD-10-CM

## 2025-01-27 RX ORDER — IBUPROFEN 400 MG/1
400 TABLET, FILM COATED ORAL EVERY 8 HOURS PRN
Qty: 30 TABLET | Refills: 0 | Status: SHIPPED | OUTPATIENT
Start: 2025-01-27

## 2025-02-13 NOTE — ASSESSMENT & PLAN NOTE
Stable  Continue gabapentin and NSAID as needed  Advised patient to use NSAID cautiously while she is taking SSRI due to risk of GI bleed. Patient is understanding.   Continue home exercise Diversional activity/Elevation/Ice/Medication/Relaxation

## 2025-02-25 ENCOUNTER — TELEPHONE (OUTPATIENT)
Dept: FAMILY MEDICINE CLINIC | Facility: CLINIC | Age: 50
End: 2025-02-25

## 2025-02-25 NOTE — TELEPHONE ENCOUNTER
Pt call the office to ask if you can order her neb machine again because it shows that it was canceled.

## 2025-03-11 ENCOUNTER — TELEPHONE (OUTPATIENT)
Dept: OBGYN CLINIC | Facility: HOSPITAL | Age: 50
End: 2025-03-11

## 2025-03-11 ENCOUNTER — TELEPHONE (OUTPATIENT)
Dept: FAMILY MEDICINE CLINIC | Facility: CLINIC | Age: 50
End: 2025-03-11
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
## 2025-03-11 NOTE — TELEPHONE ENCOUNTER
Pt called into office and is requesting if a letter can be created for pts insurance stating she is needing a root canal for her health. Pts insurance is giving her difficulties and they will not cover it unless a letter is written from her provider. Any questions please let me know and I can reach out to pt, thank you !

## 2025-04-15 ENCOUNTER — TELEPHONE (OUTPATIENT)
Dept: FAMILY MEDICINE CLINIC | Facility: CLINIC | Age: 50
End: 2025-04-15

## 2025-04-15 NOTE — TELEPHONE ENCOUNTER
Received [facility] request from Sohail Ochoa  received on 4/15/25. Request was scanned into chart and faxed to [facility].

## 2025-04-22 ENCOUNTER — APPOINTMENT (OUTPATIENT)
Dept: LAB | Facility: CLINIC | Age: 50
End: 2025-04-22
Payer: COMMERCIAL

## 2025-04-22 ENCOUNTER — OFFICE VISIT (OUTPATIENT)
Dept: FAMILY MEDICINE CLINIC | Facility: CLINIC | Age: 50
End: 2025-04-22

## 2025-04-22 VITALS
HEART RATE: 77 BPM | SYSTOLIC BLOOD PRESSURE: 124 MMHG | BODY MASS INDEX: 38.74 KG/M2 | OXYGEN SATURATION: 99 % | DIASTOLIC BLOOD PRESSURE: 68 MMHG | RESPIRATION RATE: 18 BRPM | HEIGHT: 65 IN | WEIGHT: 232.5 LBS | TEMPERATURE: 98.1 F

## 2025-04-22 DIAGNOSIS — R05.9 COUGH: ICD-10-CM

## 2025-04-22 DIAGNOSIS — M54.42 CHRONIC BILATERAL LOW BACK PAIN WITH BILATERAL SCIATICA: ICD-10-CM

## 2025-04-22 DIAGNOSIS — B37.0 THRUSH: ICD-10-CM

## 2025-04-22 DIAGNOSIS — E11.9 TYPE 2 DIABETES MELLITUS WITHOUT COMPLICATION, WITHOUT LONG-TERM CURRENT USE OF INSULIN (HCC): ICD-10-CM

## 2025-04-22 DIAGNOSIS — G47.00 INSOMNIA, UNSPECIFIED TYPE: ICD-10-CM

## 2025-04-22 DIAGNOSIS — M54.41 CHRONIC BILATERAL LOW BACK PAIN WITH BILATERAL SCIATICA: ICD-10-CM

## 2025-04-22 DIAGNOSIS — J45.40 MODERATE PERSISTENT ASTHMA WITHOUT COMPLICATION: ICD-10-CM

## 2025-04-22 DIAGNOSIS — E11.9 TYPE 2 DIABETES MELLITUS WITHOUT COMPLICATION, WITHOUT LONG-TERM CURRENT USE OF INSULIN (HCC): Primary | ICD-10-CM

## 2025-04-22 DIAGNOSIS — M51.26 LUMBAR DISC HERNIATION: ICD-10-CM

## 2025-04-22 DIAGNOSIS — G47.33 OSA (OBSTRUCTIVE SLEEP APNEA): ICD-10-CM

## 2025-04-22 DIAGNOSIS — E55.9 VITAMIN D DEFICIENCY: ICD-10-CM

## 2025-04-22 DIAGNOSIS — G89.29 CHRONIC BILATERAL LOW BACK PAIN WITH BILATERAL SCIATICA: ICD-10-CM

## 2025-04-22 DIAGNOSIS — Z23 ENCOUNTER FOR IMMUNIZATION: ICD-10-CM

## 2025-04-22 LAB — SL AMB POCT HEMOGLOBIN AIC: 5.7 (ref ?–6.5)

## 2025-04-22 PROCEDURE — 90471 IMMUNIZATION ADMIN: CPT

## 2025-04-22 PROCEDURE — 83036 HEMOGLOBIN GLYCOSYLATED A1C: CPT | Performed by: FAMILY MEDICINE

## 2025-04-22 PROCEDURE — 3074F SYST BP LT 130 MM HG: CPT | Performed by: FAMILY MEDICINE

## 2025-04-22 PROCEDURE — 3078F DIAST BP <80 MM HG: CPT | Performed by: FAMILY MEDICINE

## 2025-04-22 PROCEDURE — 90750 HZV VACC RECOMBINANT IM: CPT

## 2025-04-22 PROCEDURE — 99214 OFFICE O/P EST MOD 30 MIN: CPT | Performed by: FAMILY MEDICINE

## 2025-04-22 RX ORDER — BENZONATATE 200 MG/1
200 CAPSULE ORAL 3 TIMES DAILY PRN
Qty: 20 CAPSULE | Refills: 3 | Status: SHIPPED | OUTPATIENT
Start: 2025-04-22

## 2025-04-22 RX ORDER — GABAPENTIN 600 MG/1
600 TABLET ORAL 3 TIMES DAILY
Qty: 90 TABLET | Refills: 0 | Status: SHIPPED | OUTPATIENT
Start: 2025-04-22

## 2025-04-22 RX ORDER — CHOLECALCIFEROL (VITAMIN D3) 50 MCG
2000 TABLET ORAL DAILY
Qty: 90 TABLET | Refills: 0 | Status: SHIPPED | OUTPATIENT
Start: 2025-04-22

## 2025-04-22 RX ORDER — NYSTATIN 100000 [USP'U]/ML
500000 SUSPENSION ORAL 4 TIMES DAILY
Qty: 140 ML | Refills: 0 | Status: SHIPPED | OUTPATIENT
Start: 2025-04-22 | End: 2025-04-29

## 2025-04-22 RX ORDER — IBUPROFEN 400 MG/1
400 TABLET, FILM COATED ORAL EVERY 8 HOURS PRN
Qty: 30 TABLET | Refills: 3 | Status: SHIPPED | OUTPATIENT
Start: 2025-04-22

## 2025-04-22 RX ORDER — PHENOL 1.4 %
10 AEROSOL, SPRAY (ML) MUCOUS MEMBRANE
Qty: 90 TABLET | Refills: 1 | Status: SHIPPED | OUTPATIENT
Start: 2025-04-22

## 2025-04-22 NOTE — PROGRESS NOTES
Name: Sydnee Bourgeois      : 1975      MRN: [id number]  Encounter Provider: Js Ahuja MD  Encounter Date: 2025   Encounter department: [facility]  :  Assessment & Plan  Type 2 diabetes mellitus without complication, without long-term current use of insulin (HCC)    Lab Results   Component Value Date    HGBA1C 5.7 2025     Well-controlled  Increase Ozempic from 1 mg weekly to 2 mg weekly  Continue metformin 1000 mg twice daily  Continue Crestor  Up-to-date with yearly ophthalmology evaluation    Orders:    POCT hemoglobin A1c    semaglutide, 2 mg/dose, (Ozempic, 2 MG/DOSE,) 8 mg/ mL injection pen; Inject 0.75 mL (2 mg total) under the skin once a week    Comprehensive metabolic panel; Future    Vitamin B12; Future    CBC and differential; Future    Lipid panel; Future    Moderate persistent asthma without complication  Stable  No recent exacerbation  Continue albuterol as needed  Continue Advair daily       TERESA (obstructive sleep apnea)  History of TERESA  Does not use CPAP  Sleep medicine consultation recommended  Continue working on weight loss       Insomnia, unspecified type    Orders:    Melatonin 10 MG TABS; Take 1 tablet (10 mg total) by mouth daily at bedtime    Cough    Orders:    benzonatate (TESSALON) 200 MG capsule; Take 1 capsule (200 mg total) by mouth 3 (three) times a day as needed for cough    Vitamin D deficiency    Orders:    Cholecalciferol (Vitamin D3) 50 MCG (2000 UT) TABS; Take 1 tablet (2,000 Units total) by mouth daily    Chronic bilateral low back pain with bilateral sciatica    Orders:    ibuprofen (MOTRIN) 400 mg tablet; Take 1 tablet (400 mg total) by mouth every 8 (eight) hours as needed for mild pain    gabapentin (NEURONTIN) 600 MG tablet; Take 1 tablet (600 mg total) by mouth 3 (three) times a day    Lumbar disc herniation    Orders:    ibuprofen (MOTRIN) 400 mg tablet; Take 1 tablet (400 mg total) by mouth every  "8 (eight) hours as needed for mild pain    Thrush  Recommend to rinse her mouth daily after using steroid inhalers    Orders:    nystatin (MYCOSTATIN) 500,000 units/5 mL suspension; Apply 5 mL (500,000 Units total) to the mouth or throat 4 (four) times a day for 7 days    Encounter for immunization    Orders:    Zoster Vaccine Recombinant IM           History of Present Illness   50-year-old female with a history of diabetes, obesity, and TERESA who presents today for follow-up.  She is doing okay overall.  She needs prescription refills.  She has a diagnosis of sleep apnea.  She does not wear CPAP machine.  She has scheduled appointment for sleep medicine evaluation in next coming weeks.  She uses melatonin at nighttime to help her sleep at night.  She is requesting a higher dose of melatonin to help improve her sleep. She has a white lesion on her tongue that she noticed a few days ago.  She sometimes gets thrush.  She uses steroid inhalers for asthma.  She does not often rinse her mouth after each use.        Review of Systems   Constitutional:  Negative for appetite change, chills, diaphoresis, fatigue, fever and unexpected weight change.   HENT:  Negative for congestion and sore throat.    Respiratory:  Negative for cough, shortness of breath and wheezing.    Cardiovascular:  Negative for chest pain, palpitations and leg swelling.   Gastrointestinal:  Negative for abdominal pain, diarrhea, nausea and vomiting.   Endocrine: Negative for polydipsia, polyphagia and polyuria.   Genitourinary:  Negative for dysuria, hematuria and urgency.   Musculoskeletal:  Negative for arthralgias.   Skin:  Negative for rash.   Neurological:  Negative for dizziness, syncope, weakness, numbness and headaches.   Hematological:  Negative for adenopathy.       Objective   /68 (BP Location: Left arm, Patient Position: Sitting, Cuff Size: Standard)   Pulse 77   Temp 98.1 °F (36.7 °C) (Temporal)   Resp 18   Ht 5' 5\" (1.651 m)   " Wt 105 kg (232 lb 8 oz)   LMP 09/01/2019 (Approximate)   SpO2 99%   BMI 38.69 kg/m²      Physical Exam  Constitutional:       General: She is not in acute distress.     Appearance: Normal appearance. She is well-developed. She is obese. She is not ill-appearing, toxic-appearing or diaphoretic.   HENT:      Head: Normocephalic and atraumatic.      Right Ear: External ear normal.      Left Ear: External ear normal.      Nose: Nose normal.      Mouth/Throat:      Mouth: Mucous membranes are moist.        Comments: Whitish scrappable plaque along the tongue  Eyes:      General: No scleral icterus.        Right eye: No discharge.         Left eye: No discharge.      Extraocular Movements: Extraocular movements intact.      Pupils: Pupils are equal, round, and reactive to light.   Cardiovascular:      Rate and Rhythm: Normal rate and regular rhythm.      Heart sounds: Normal heart sounds. No murmur heard.     No friction rub. No gallop.   Pulmonary:      Effort: Pulmonary effort is normal. No respiratory distress.      Breath sounds: Normal breath sounds. No stridor. No wheezing or rhonchi.   Abdominal:      General: Bowel sounds are normal. There is no distension.      Palpations: Abdomen is soft. There is no mass.      Tenderness: There is no abdominal tenderness. There is no guarding.   Musculoskeletal:         General: Normal range of motion.      Cervical back: Normal range of motion.      Lumbar back: Spasms and tenderness present. No swelling or edema. Normal range of motion.      Right lower leg: No edema.      Left lower leg: No edema.   Skin:     General: Skin is warm.   Neurological:      General: No focal deficit present.      Mental Status: She is alert and oriented to person, place, and time.      Gait: Gait normal.   Psychiatric:         Mood and Affect: Mood normal.         Behavior: Behavior normal.

## 2025-04-22 NOTE — ASSESSMENT & PLAN NOTE
Lab Results   Component Value Date    HGBA1C 5.7 04/22/2025     Well-controlled  Increase Ozempic from 1 mg weekly to 2 mg weekly  Continue metformin 1000 mg twice daily  Continue Crestor  Up-to-date with yearly ophthalmology evaluation    Orders:    POCT hemoglobin A1c    semaglutide, 2 mg/dose, (Ozempic, 2 MG/DOSE,) 8 mg/ mL injection pen; Inject 0.75 mL (2 mg total) under the skin once a week    Comprehensive metabolic panel; Future    Vitamin B12; Future    CBC and differential; Future    Lipid panel; Future

## 2025-04-22 NOTE — ASSESSMENT & PLAN NOTE
Orders:    ibuprofen (MOTRIN) 400 mg tablet; Take 1 tablet (400 mg total) by mouth every 8 (eight) hours as needed for mild pain

## 2025-04-22 NOTE — ASSESSMENT & PLAN NOTE
Orders:    Cholecalciferol (Vitamin D3) 50 MCG (2000 UT) TABS; Take 1 tablet (2,000 Units total) by mouth daily

## 2025-04-22 NOTE — ASSESSMENT & PLAN NOTE
Orders:    ibuprofen (MOTRIN) 400 mg tablet; Take 1 tablet (400 mg total) by mouth every 8 (eight) hours as needed for mild pain    gabapentin (NEURONTIN) 600 MG tablet; Take 1 tablet (600 mg total) by mouth 3 (three) times a day

## 2025-04-22 NOTE — ASSESSMENT & PLAN NOTE
History of TERESA  Does not use CPAP  Sleep medicine consultation recommended  Continue working on weight loss

## 2025-05-08 DIAGNOSIS — J45.40 MODERATE PERSISTENT ASTHMA WITHOUT COMPLICATION: ICD-10-CM

## 2025-05-08 RX ORDER — ALBUTEROL SULFATE 90 UG/1
INHALANT RESPIRATORY (INHALATION)
Qty: 18 G | Refills: 3 | Status: SHIPPED | OUTPATIENT
Start: 2025-05-08

## 2025-05-18 DIAGNOSIS — J45.40 MODERATE PERSISTENT ASTHMA WITHOUT COMPLICATION: ICD-10-CM

## 2025-05-18 DIAGNOSIS — K21.9 GASTROESOPHAGEAL REFLUX DISEASE WITHOUT ESOPHAGITIS: ICD-10-CM

## 2025-05-19 RX ORDER — MONTELUKAST SODIUM 10 MG/1
10 TABLET ORAL
Qty: 30 TABLET | Refills: 3 | Status: SHIPPED | OUTPATIENT
Start: 2025-05-19

## 2025-05-19 RX ORDER — PANTOPRAZOLE SODIUM 40 MG/1
40 TABLET, DELAYED RELEASE ORAL DAILY
Qty: 30 TABLET | Refills: 3 | Status: SHIPPED | OUTPATIENT
Start: 2025-05-19

## 2025-05-22 DIAGNOSIS — M54.42 CHRONIC BILATERAL LOW BACK PAIN WITH BILATERAL SCIATICA: ICD-10-CM

## 2025-05-22 DIAGNOSIS — G89.29 CHRONIC BILATERAL LOW BACK PAIN WITH BILATERAL SCIATICA: ICD-10-CM

## 2025-05-22 DIAGNOSIS — M54.41 CHRONIC BILATERAL LOW BACK PAIN WITH BILATERAL SCIATICA: ICD-10-CM

## 2025-05-22 RX ORDER — GABAPENTIN 600 MG/1
TABLET ORAL
Qty: 90 TABLET | Refills: 0 | Status: SHIPPED | OUTPATIENT
Start: 2025-05-22

## 2025-06-17 ENCOUNTER — TELEMEDICINE (OUTPATIENT)
Dept: OTHER | Facility: HOSPITAL | Age: 50
End: 2025-06-17
Payer: COMMERCIAL

## 2025-06-17 DIAGNOSIS — J40 BRONCHITIS: ICD-10-CM

## 2025-06-17 DIAGNOSIS — J45.21 MILD INTERMITTENT ASTHMA WITH EXACERBATION: Primary | ICD-10-CM

## 2025-06-17 DIAGNOSIS — R05.9 COUGH: ICD-10-CM

## 2025-06-17 PROCEDURE — 99213 OFFICE O/P EST LOW 20 MIN: CPT | Performed by: PHYSICIAN ASSISTANT

## 2025-06-17 RX ORDER — PREDNISONE 20 MG/1
40 TABLET ORAL DAILY
Qty: 10 TABLET | Refills: 0 | Status: SHIPPED | OUTPATIENT
Start: 2025-06-17 | End: 2025-06-22

## 2025-06-17 RX ORDER — BENZONATATE 200 MG/1
200 CAPSULE ORAL 3 TIMES DAILY PRN
Qty: 20 CAPSULE | Refills: 3 | Status: SHIPPED | OUTPATIENT
Start: 2025-06-17

## 2025-06-17 RX ORDER — AZELASTINE 1 MG/ML
1 SPRAY, METERED NASAL 2 TIMES DAILY
Qty: 1 ML | Refills: 0 | Status: SHIPPED | OUTPATIENT
Start: 2025-06-17

## 2025-06-17 NOTE — PROGRESS NOTES
Virtual Regular Visit  Name: Sydnee Bourgeois      : 1975      MRN: [id number]  Encounter Provider: Amy Holliday PA-C  Encounter Date: 2025   Encounter department: VIRTUAL CARE       Verification of patient location:  Patient is located at Home in the following state in which I hold an active license [place] :  Assessment & Plan  Mild intermittent asthma with exacerbation    Orders:    predniSONE 20 mg tablet; Take 2 tablets (40 mg total) by mouth daily for 5 days    azelastine (ASTELIN) 0.1 % nasal spray; 1 spray into each nostril 2 (two) times a day Use in each nostril as directed    Bronchitis         Cough    Orders:    benzonatate (TESSALON) 200 MG capsule; Take 1 capsule (200 mg total) by mouth 3 (three) times a day as needed for cough        Encounter provider Amy Holliday PA-C    The patient was identified by name and date of birth. Sydnee Bourgeois was informed that this is a telemedicine visit and that the visit is being conducted through the Epic Embedded platform. She agrees to proceed..  My office door was closed. No one else was in the room. She acknowledged consent and understanding of privacy and security of the video platform. The patient has agreed to participate and understands they can discontinue the visit at any time.    Patient is aware this is a billable service.     History obtained from: patient  History of Present Illness     50 y.o. female presents for evaluation of cough, chest tightness, intermittent wheezing after cleaning and removing window coverings. Notes history of asthma and allergies and feels this exacerbation is caused by allergies. She has been using rescue inhaler, nebulizer, claritin and nasal spray with some relief. Denies fever, chills, N/v/d, SOB, CP, HA, blurry vision, dizziness or fainting. History and physical was obtained with help of interpretor [id number]      Review of Systems   Constitutional:  Negative for fatigue and fever.   HENT:   Positive for postnasal drip and rhinorrhea. Negative for congestion.    Respiratory:  Positive for cough, chest tightness and wheezing. Negative for shortness of breath.    All other systems reviewed and are negative.      Objective   LMP 09/01/2019 (Approximate)     Physical Exam  Constitutional:       General: She is not in acute distress.     Appearance: Normal appearance. She is not ill-appearing or toxic-appearing.   HENT:      Head: Normocephalic and atraumatic.      Right Ear: External ear normal.      Left Ear: External ear normal.      Nose: Nose normal.     Eyes:      Extraocular Movements: Extraocular movements intact.      Conjunctiva/sclera: Conjunctivae normal.     Pulmonary:      Effort: Pulmonary effort is normal. No respiratory distress.      Breath sounds: No stridor. No wheezing.     Neurological:      Mental Status: She is alert and oriented to person, place, and time. Mental status is at baseline.     Psychiatric:         Mood and Affect: Mood normal.         Behavior: Behavior normal.         Thought Content: Thought content normal.         Judgment: Judgment normal.         Visit Time  Total Visit Duration: 6 minutes not including the time spent for establishing the audio/video connection.

## 2025-06-18 DIAGNOSIS — J45.40 MODERATE PERSISTENT ASTHMA WITHOUT COMPLICATION: ICD-10-CM

## 2025-06-19 DIAGNOSIS — M54.42 CHRONIC BILATERAL LOW BACK PAIN WITH BILATERAL SCIATICA: ICD-10-CM

## 2025-06-19 DIAGNOSIS — M54.41 CHRONIC BILATERAL LOW BACK PAIN WITH BILATERAL SCIATICA: ICD-10-CM

## 2025-06-19 DIAGNOSIS — G89.29 CHRONIC BILATERAL LOW BACK PAIN WITH BILATERAL SCIATICA: ICD-10-CM

## 2025-06-19 RX ORDER — GABAPENTIN 600 MG/1
TABLET ORAL
Qty: 90 TABLET | Refills: 0 | Status: SHIPPED | OUTPATIENT
Start: 2025-06-19

## 2025-06-19 RX ORDER — FLUTICASONE PROPIONATE 50 MCG
SPRAY, SUSPENSION (ML) NASAL
Qty: 16 ML | Refills: 1 | Status: SHIPPED | OUTPATIENT
Start: 2025-06-19

## 2025-07-17 DIAGNOSIS — E11.9 TYPE 2 DIABETES MELLITUS WITHOUT COMPLICATION, WITHOUT LONG-TERM CURRENT USE OF INSULIN (HCC): ICD-10-CM

## 2025-08-19 ENCOUNTER — OFFICE VISIT (OUTPATIENT)
Dept: FAMILY MEDICINE CLINIC | Facility: CLINIC | Age: 50
End: 2025-08-19

## 2025-08-19 ENCOUNTER — APPOINTMENT (OUTPATIENT)
Dept: LAB | Facility: CLINIC | Age: 50
End: 2025-08-19
Payer: COMMERCIAL

## 2025-08-19 VITALS
WEIGHT: 226.5 LBS | SYSTOLIC BLOOD PRESSURE: 128 MMHG | HEART RATE: 66 BPM | TEMPERATURE: 97.8 F | DIASTOLIC BLOOD PRESSURE: 84 MMHG | BODY MASS INDEX: 37.74 KG/M2 | HEIGHT: 65 IN | RESPIRATION RATE: 16 BRPM | OXYGEN SATURATION: 98 %

## 2025-08-19 DIAGNOSIS — K21.9 GASTROESOPHAGEAL REFLUX DISEASE WITHOUT ESOPHAGITIS: ICD-10-CM

## 2025-08-19 DIAGNOSIS — G89.29 CHRONIC BILATERAL LOW BACK PAIN WITH BILATERAL SCIATICA: ICD-10-CM

## 2025-08-19 DIAGNOSIS — J45.40 MODERATE PERSISTENT ASTHMA WITHOUT COMPLICATION: ICD-10-CM

## 2025-08-19 DIAGNOSIS — M54.42 CHRONIC BILATERAL LOW BACK PAIN WITH BILATERAL SCIATICA: ICD-10-CM

## 2025-08-19 DIAGNOSIS — Z01.419 ENCOUNTER FOR GYNECOLOGICAL EXAMINATION: ICD-10-CM

## 2025-08-19 DIAGNOSIS — G47.33 OSA (OBSTRUCTIVE SLEEP APNEA): ICD-10-CM

## 2025-08-19 DIAGNOSIS — E66.9 OBESITY (BMI 30-39.9): ICD-10-CM

## 2025-08-19 DIAGNOSIS — E11.9 TYPE 2 DIABETES MELLITUS WITHOUT COMPLICATION, WITHOUT LONG-TERM CURRENT USE OF INSULIN (HCC): Primary | ICD-10-CM

## 2025-08-19 DIAGNOSIS — M54.41 CHRONIC BILATERAL LOW BACK PAIN WITH BILATERAL SCIATICA: ICD-10-CM

## 2025-08-19 PROBLEM — M79.642 PAIN IN BOTH HANDS: Status: RESOLVED | Noted: 2019-07-16 | Resolved: 2025-08-19

## 2025-08-19 PROBLEM — B35.4 TINEA CORPORIS: Status: RESOLVED | Noted: 2023-11-16 | Resolved: 2025-08-19

## 2025-08-19 PROBLEM — Z01.818 PREOP EXAMINATION: Status: RESOLVED | Noted: 2023-06-28 | Resolved: 2025-08-19

## 2025-08-19 PROBLEM — S00.03XA HEMATOMA OF SCALP: Status: RESOLVED | Noted: 2023-08-08 | Resolved: 2025-08-19

## 2025-08-19 PROBLEM — R89.9 ABNORMAL LABORATORY TEST RESULT: Status: RESOLVED | Noted: 2023-08-18 | Resolved: 2025-08-19

## 2025-08-19 PROBLEM — E66.01 MORBID OBESITY (HCC): Status: RESOLVED | Noted: 2019-07-16 | Resolved: 2025-08-19

## 2025-08-19 PROBLEM — M79.89 LEG SWELLING: Status: RESOLVED | Noted: 2019-07-16 | Resolved: 2025-08-19

## 2025-08-19 PROBLEM — M79.641 PAIN IN BOTH HANDS: Status: RESOLVED | Noted: 2019-07-16 | Resolved: 2025-08-19

## 2025-08-19 LAB
ALBUMIN SERPL BCG-MCNC: 3.9 G/DL (ref 3.5–5)
ALP SERPL-CCNC: 82 U/L (ref 34–104)
ALT SERPL W P-5'-P-CCNC: 18 U/L (ref 7–52)
ANION GAP SERPL CALCULATED.3IONS-SCNC: 9 MMOL/L (ref 4–13)
AST SERPL W P-5'-P-CCNC: 20 U/L (ref 13–39)
BASOPHILS # BLD AUTO: 0.04 THOUSANDS/ÂΜL (ref 0–0.1)
BASOPHILS NFR BLD AUTO: 1 % (ref 0–1)
BILIRUB SERPL-MCNC: 0.51 MG/DL (ref 0.2–1)
BUN SERPL-MCNC: 14 MG/DL (ref 5–25)
CALCIUM SERPL-MCNC: 9.4 MG/DL (ref 8.4–10.2)
CHLORIDE SERPL-SCNC: 105 MMOL/L (ref 96–108)
CHOLEST SERPL-MCNC: 176 MG/DL (ref ?–200)
CO2 SERPL-SCNC: 27 MMOL/L (ref 21–32)
CREAT SERPL-MCNC: 0.83 MG/DL (ref 0.6–1.3)
EOSINOPHIL # BLD AUTO: 0.1 THOUSAND/ÂΜL (ref 0–0.61)
EOSINOPHIL NFR BLD AUTO: 1 % (ref 0–6)
ERYTHROCYTE [DISTWIDTH] IN BLOOD BY AUTOMATED COUNT: 12.3 % (ref 11.6–15.1)
GFR SERPL CREATININE-BSD FRML MDRD: 81 ML/MIN/1.73SQ M
GLUCOSE P FAST SERPL-MCNC: 111 MG/DL (ref 65–99)
HCT VFR BLD AUTO: 41.4 % (ref 34.8–46.1)
HDLC SERPL-MCNC: 50 MG/DL
HGB BLD-MCNC: 13.2 G/DL (ref 11.5–15.4)
IMM GRANULOCYTES # BLD AUTO: 0.02 THOUSAND/UL (ref 0–0.2)
IMM GRANULOCYTES NFR BLD AUTO: 0 % (ref 0–2)
LDLC SERPL CALC-MCNC: 105 MG/DL (ref 0–100)
LYMPHOCYTES # BLD AUTO: 3.04 THOUSANDS/ÂΜL (ref 0.6–4.47)
LYMPHOCYTES NFR BLD AUTO: 41 % (ref 14–44)
MCH RBC QN AUTO: 30.3 PG (ref 26.8–34.3)
MCHC RBC AUTO-ENTMCNC: 31.9 G/DL (ref 31.4–37.4)
MCV RBC AUTO: 95 FL (ref 82–98)
MONOCYTES # BLD AUTO: 0.61 THOUSAND/ÂΜL (ref 0.17–1.22)
MONOCYTES NFR BLD AUTO: 8 % (ref 4–12)
NEUTROPHILS # BLD AUTO: 3.55 THOUSANDS/ÂΜL (ref 1.85–7.62)
NEUTS SEG NFR BLD AUTO: 49 % (ref 43–75)
NONHDLC SERPL-MCNC: 126 MG/DL
NRBC BLD AUTO-RTO: 0 /100 WBCS
PLATELET # BLD AUTO: 190 THOUSANDS/UL (ref 149–390)
PMV BLD AUTO: 11.5 FL (ref 8.9–12.7)
POTASSIUM SERPL-SCNC: 4.6 MMOL/L (ref 3.5–5.3)
PROT SERPL-MCNC: 7.1 G/DL (ref 6.4–8.4)
RBC # BLD AUTO: 4.35 MILLION/UL (ref 3.81–5.12)
SL AMB POCT HEMOGLOBIN AIC: 5.5 (ref ?–6.5)
SODIUM SERPL-SCNC: 141 MMOL/L (ref 135–147)
TRIGL SERPL-MCNC: 106 MG/DL (ref ?–150)
VIT B12 SERPL-MCNC: 518 PG/ML (ref 180–914)
WBC # BLD AUTO: 7.36 THOUSAND/UL (ref 4.31–10.16)

## 2025-08-19 PROCEDURE — 3079F DIAST BP 80-89 MM HG: CPT | Performed by: FAMILY MEDICINE

## 2025-08-19 PROCEDURE — 83036 HEMOGLOBIN GLYCOSYLATED A1C: CPT | Performed by: FAMILY MEDICINE

## 2025-08-19 PROCEDURE — 3074F SYST BP LT 130 MM HG: CPT | Performed by: FAMILY MEDICINE

## 2025-08-19 PROCEDURE — 99214 OFFICE O/P EST MOD 30 MIN: CPT | Performed by: FAMILY MEDICINE

## 2025-08-19 RX ORDER — MOMETASONE FUROATE AND FORMOTEROL FUMARATE DIHYDRATE 200; 5 UG/1; UG/1
2 AEROSOL RESPIRATORY (INHALATION) 2 TIMES DAILY
Qty: 13 G | Refills: 3 | Status: SHIPPED | OUTPATIENT
Start: 2025-08-19

## 2025-08-19 RX ORDER — PANTOPRAZOLE SODIUM 40 MG/1
40 TABLET, DELAYED RELEASE ORAL DAILY
Qty: 30 TABLET | Refills: 3 | Status: SHIPPED | OUTPATIENT
Start: 2025-08-19

## 2025-08-19 RX ORDER — GABAPENTIN 600 MG/1
600 TABLET ORAL 3 TIMES DAILY
Qty: 90 TABLET | Refills: 1 | Status: SHIPPED | OUTPATIENT
Start: 2025-08-19

## 2025-08-19 RX ORDER — MONTELUKAST SODIUM 10 MG/1
10 TABLET ORAL
Qty: 30 TABLET | Refills: 3 | Status: SHIPPED | OUTPATIENT
Start: 2025-08-19

## 2025-08-19 RX ORDER — ALBUTEROL SULFATE 90 UG/1
2 INHALANT RESPIRATORY (INHALATION) 4 TIMES DAILY
Qty: 18 G | Refills: 3 | Status: SHIPPED | OUTPATIENT
Start: 2025-08-19

## 2025-08-19 RX ORDER — ROSUVASTATIN CALCIUM 10 MG/1
10 TABLET, COATED ORAL DAILY
Qty: 90 TABLET | Refills: 1 | Status: SHIPPED | OUTPATIENT
Start: 2025-08-19

## 2025-08-19 RX ORDER — ALBUTEROL SULFATE 0.83 MG/ML
2.5 SOLUTION RESPIRATORY (INHALATION) EVERY 6 HOURS PRN
Qty: 30 ML | Refills: 2 | Status: SHIPPED | OUTPATIENT
Start: 2025-08-19

## 2025-08-20 DIAGNOSIS — M54.42 CHRONIC BILATERAL LOW BACK PAIN WITH BILATERAL SCIATICA: ICD-10-CM

## 2025-08-20 DIAGNOSIS — J45.40 MODERATE PERSISTENT ASTHMA WITHOUT COMPLICATION: ICD-10-CM

## 2025-08-20 DIAGNOSIS — G89.29 CHRONIC BILATERAL LOW BACK PAIN WITH BILATERAL SCIATICA: ICD-10-CM

## 2025-08-20 DIAGNOSIS — M54.41 CHRONIC BILATERAL LOW BACK PAIN WITH BILATERAL SCIATICA: ICD-10-CM

## 2025-08-20 DIAGNOSIS — M51.26 LUMBAR DISC HERNIATION: ICD-10-CM

## 2025-08-20 RX ORDER — IBUPROFEN 400 MG/1
400 TABLET, FILM COATED ORAL EVERY 8 HOURS PRN
Qty: 30 TABLET | Refills: 2 | Status: SHIPPED | OUTPATIENT
Start: 2025-08-20

## 2025-08-20 RX ORDER — FLUTICASONE PROPIONATE 50 MCG
SPRAY, SUSPENSION (ML) NASAL
Qty: 16 ML | Refills: 1 | Status: SHIPPED | OUTPATIENT
Start: 2025-08-20

## (undated) DEVICE — 3M™ STERI-STRIP™ REINFORCED ADHESIVE SKIN CLOSURES, R1547, 1/2 IN X 4 IN (12 MM X 100 MM), 6 STRIPS/ENVELOPE: Brand: 3M™ STERI-STRIP™

## (undated) DEVICE — SUT MONOCRYL 4-0 PS-2 27 IN Y426H

## (undated) DEVICE — DRAPE SHEET THREE QUARTER

## (undated) DEVICE — ACE WRAP 4 IN UNSTERILE

## (undated) DEVICE — ACE WRAP 6 IN UNSTERILE

## (undated) DEVICE — ACE WRAP 6 IN STERILE

## (undated) DEVICE — FIBER PROC KIT GOLD TIP 21G 45CM NEVERTOUCH

## (undated) DEVICE — TONGUE DEPRESSOR STERILE

## (undated) DEVICE — INTENDED FOR TISSUE SEPARATION, AND OTHER PROCEDURES THAT REQUIRE A SHARP SURGICAL BLADE TO PUNCTURE OR CUT.: Brand: BARD-PARKER SAFETY BLADES SIZE 11, STERILE

## (undated) DEVICE — BAG DECANTER

## (undated) DEVICE — Device

## (undated) DEVICE — ALL PURPOSE SPONGES,NON-WOVEN, 4 PLY: Brand: CURITY

## (undated) DEVICE — KERLIX BANDAGE ROLL: Brand: KERLIX

## (undated) DEVICE — 10FR FRAZIER SUCTION HANDLE: Brand: CARDINAL HEALTH

## (undated) DEVICE — HYDROPHILIC WOUND DRESSING WITH ZINC PLUS VITAMINS A AND B6.: Brand: DERMAGRAN®-B

## (undated) DEVICE — PROVE COVER: Brand: UNBRANDED

## (undated) DEVICE — TIBURON SPLIT SHEET: Brand: CONVERTORS

## (undated) DEVICE — LIGHT HANDLE COVER SLEEVE DISP BLUE STELLAR

## (undated) DEVICE — 2000CC GUARDIAN II: Brand: GUARDIAN

## (undated) DEVICE — DRESSING MEPILEX AG BORDER 4 X 4 IN

## (undated) DEVICE — ACE WRAP 6 IN XL STERILE

## (undated) DEVICE — ULTRASOUND GEL STERILE FOIL PK

## (undated) DEVICE — COBAN 4 IN STERILE

## (undated) DEVICE — SUT SILK 3-0 30 IN A304H

## (undated) DEVICE — DRAPE SHEET X-LG

## (undated) DEVICE — GLOVE INDICATOR PI UNDERGLOVE SZ 8 BLUE

## (undated) DEVICE — INTENDED FOR TISSUE SEPARATION, AND OTHER PROCEDURES THAT REQUIRE A SHARP SURGICAL BLADE TO PUNCTURE OR CUT.: Brand: BARD-PARKER SAFETY BLADES SIZE 15, STERILE

## (undated) DEVICE — PADDING CAST 4 IN  COTTON STRL

## (undated) DEVICE — CHLORAPREP HI-LITE 26ML ORANGE

## (undated) DEVICE — STRL UNIVERSAL MINOR GENERAL: Brand: CARDINAL HEALTH

## (undated) DEVICE — SUT MONOCRYL 3-0 SH 27 IN Y416H

## (undated) DEVICE — GLOVE SRG BIOGEL 7.5

## (undated) DEVICE — REM POLYHESIVE ADULT PATIENT RETURN ELECTRODE: Brand: VALLEYLAB

## (undated) DEVICE — PROXIMATE SKIN STAPLERS (35 WIDE) CONTAINS 35 STAINLESS STEEL STAPLES (FIXED HEAD): Brand: PROXIMATE

## (undated) DEVICE — ACE WRAP 4 IN STERILE

## (undated) DEVICE — SPONGE LAP 18 X 18 IN

## (undated) DEVICE — LIGACLIP MCA MULTIPLE CLIP APPLIERS, 20 SMALL CLIPS: Brand: LIGACLIP

## (undated) DEVICE — ADHESIVE SKN CLSR HISTOACRYL FLEX 0.5ML LF

## (undated) DEVICE — BETHLEHEM UNIVERSAL MINOR GEN: Brand: CARDINAL HEALTH

## (undated) DEVICE — COVER PROBE INTRAOPERATIVE 6 X 96 IN

## (undated) DEVICE — GAUZE SPONGES,16 PLY: Brand: CURITY

## (undated) DEVICE — TUBING SUCTION 5MM X 12 FT

## (undated) DEVICE — SUT MONOCRYL 2-0 SH 27 IN Y417H

## (undated) DEVICE — INTENDED FOR TISSUE SEPARATION, AND OTHER PROCEDURES THAT REQUIRE A SHARP SURGICAL BLADE TO PUNCTURE OR CUT.: Brand: BARD-PARKER ® CARBON RIB-BACK BLADES

## (undated) DEVICE — GLOVE INDICATOR PI UNDERGLOVE SZ 6.5 BLUE

## (undated) DEVICE — VEIN STRIPPER

## (undated) DEVICE — GLOVE SRG BIOGEL 6.5